# Patient Record
Sex: FEMALE | Race: BLACK OR AFRICAN AMERICAN | NOT HISPANIC OR LATINO | Employment: UNEMPLOYED | URBAN - METROPOLITAN AREA
[De-identification: names, ages, dates, MRNs, and addresses within clinical notes are randomized per-mention and may not be internally consistent; named-entity substitution may affect disease eponyms.]

---

## 2017-06-19 ENCOUNTER — OFFICE VISIT (OUTPATIENT)
Dept: OBSTETRICS AND GYNECOLOGY | Facility: CLINIC | Age: 37
End: 2017-06-19
Payer: COMMERCIAL

## 2017-06-19 VITALS
SYSTOLIC BLOOD PRESSURE: 130 MMHG | HEIGHT: 62 IN | DIASTOLIC BLOOD PRESSURE: 90 MMHG | WEIGHT: 191.81 LBS | BODY MASS INDEX: 35.3 KG/M2

## 2017-06-19 DIAGNOSIS — Z12.4 SCREENING FOR CERVICAL CANCER: ICD-10-CM

## 2017-06-19 DIAGNOSIS — N76.0 BACTERIAL VAGINOSIS: ICD-10-CM

## 2017-06-19 DIAGNOSIS — B96.89 BACTERIAL VAGINOSIS: ICD-10-CM

## 2017-06-19 DIAGNOSIS — Z01.419 ENCOUNTER FOR GYNECOLOGICAL EXAMINATION (GENERAL) (ROUTINE) WITHOUT ABNORMAL FINDINGS: Primary | ICD-10-CM

## 2017-06-19 PROCEDURE — 88175 CYTOPATH C/V AUTO FLUID REDO: CPT

## 2017-06-19 PROCEDURE — 99385 PREV VISIT NEW AGE 18-39: CPT | Mod: S$GLB,,, | Performed by: OBSTETRICS & GYNECOLOGY

## 2017-06-19 PROCEDURE — 99999 PR PBB SHADOW E&M-NEW PATIENT-LVL III: CPT | Mod: PBBFAC,,, | Performed by: OBSTETRICS & GYNECOLOGY

## 2017-06-19 PROCEDURE — 87480 CANDIDA DNA DIR PROBE: CPT

## 2017-06-19 RX ORDER — SITAGLIPTIN 50 MG/1
1 TABLET, FILM COATED ORAL DAILY
COMMUNITY
Start: 2017-04-29 | End: 2018-01-02 | Stop reason: DRUGHIGH

## 2017-06-19 RX ORDER — ERGOCALCIFEROL 1.25 MG/1
50000 CAPSULE ORAL
COMMUNITY
End: 2018-11-07

## 2017-06-19 NOTE — PROGRESS NOTES
Subjective:       Patient ID: Raulwabhishek Watts is a 36 y.o. female.    Chief Complaint:  Well Woman      History of Present Illness  HPI  Annual Exam-Premenopausal  Patient presents for annual exam. The patient has no complaints today. The patient is sexually active--denies pelvic pain or discomfort with intercourse. GYN screening history: last pap: approximate date cannot recall and was abnormal: --mayda secondary to hx of abnl pap and last mammogram: patient has never had a mammogram. The patient wears seatbelts: yes. The patient participates in regular exercise: yes.--but feels she is struggling with weight loss. Has the patient ever been transfused or tattooed?: yes. The patient reports that there is not domestic violence in her life.      Denies leak of urine  Denies hot flushes or night sweats    Regrets having had mayda--wants a son--(her stillbirth at 37 w was a son)          GYN & OB History  No LMP recorded. Patient has had a hysterectomy.   Date of Last Pap: No result found    OB History    Para Term  AB Living   7 6    6   SAB TAB Ectopic Multiple Live Births             # Outcome Date GA Lbr Girish/2nd Weight Sex Delivery Anes PTL Lv   7          FD   6 Para            5 Para            4 Para            3 Para            2 Para            1 Para                   Review of Systems  Review of Systems   Constitutional: Negative for activity change, appetite change, chills, diaphoresis, fatigue, fever and unexpected weight change.   HENT: Negative for mouth sores and tinnitus.    Eyes: Negative for discharge and visual disturbance.   Respiratory: Negative for cough, shortness of breath and wheezing.    Cardiovascular: Negative for chest pain, palpitations and leg swelling.   Gastrointestinal: Negative for abdominal pain, bloating, blood in stool, constipation, diarrhea, nausea and vomiting.   Endocrine: Negative for diabetes, hair loss, hot flashes, hyperthyroidism and hypothyroidism.    Genitourinary: Positive for vaginal discharge. Negative for decreased libido, dyspareunia, dysuria, flank pain, frequency, genital sores, hematuria, menorrhagia, menstrual problem, pelvic pain, urgency, vaginal bleeding, vaginal pain, dysmenorrhea, urinary incontinence, postcoital bleeding, postmenopausal bleeding and vaginal odor.   Musculoskeletal: Negative for back pain and myalgias.   Skin:  Negative for rash, no acne and hair changes.   Neurological: Negative for seizures, syncope, numbness and headaches.   Hematological: Negative for adenopathy. Does not bruise/bleed easily.   Psychiatric/Behavioral: Negative for depression and sleep disturbance. The patient is not nervous/anxious.    Breast: Negative for breast mass, breast pain, nipple discharge and skin changes          Objective:    Physical Exam:   Constitutional: She appears well-developed.     Eyes: Conjunctivae and EOM are normal. Pupils are equal, round, and reactive to light.    Neck: Normal range of motion. Neck supple.     Pulmonary/Chest: Effort normal. Right breast exhibits no mass, no nipple discharge, no skin change and no tenderness. Left breast exhibits no mass, no nipple discharge, no skin change and no tenderness. Breasts are symmetrical.        Abdominal: Soft.     Genitourinary: Rectum normal. Pelvic exam was performed with patient supine. Uterus is absent. Right adnexum displays no mass and no tenderness. Left adnexum displays no mass and no tenderness. No erythema, bleeding, rectocele, cystocele or unspecified prolapse of vaginal walls in the vagina. Vaginal discharge found. Labial bartholins normal.Cervix exhibits absence. Additional cervical findings: pap smear done          Musculoskeletal: Normal range of motion.       Neurological: She is alert.    Skin: Skin is warm.    Psychiatric: She has a normal mood and affect.          Assessment:     Encounter Diagnoses   Name Primary?    Encounter for gynecological examination (general)  (routine) without abnormal findings Yes    Screening for cervical cancer     Bacterial vaginosis                Plan:      Continue annual well woman exam.  Pap today due to hx of abnl pap  mammo due at age 41  Will get tsh,vit d labs for review--pt concerned that her thyroid meds were stopped and she is having more difficulty losing weight and feeling sluggish  Affirm today  Continue diet, exercise, weight loss

## 2017-06-20 LAB
CANDIDA RRNA VAG QL PROBE: NEGATIVE
G VAGINALIS RRNA GENITAL QL PROBE: NEGATIVE
T VAGINALIS RRNA GENITAL QL PROBE: NEGATIVE

## 2017-12-01 ENCOUNTER — LAB VISIT (OUTPATIENT)
Dept: LAB | Facility: HOSPITAL | Age: 37
End: 2017-12-01
Attending: INTERNAL MEDICINE
Payer: COMMERCIAL

## 2017-12-01 ENCOUNTER — OFFICE VISIT (OUTPATIENT)
Dept: INTERNAL MEDICINE | Facility: CLINIC | Age: 37
End: 2017-12-01
Payer: COMMERCIAL

## 2017-12-01 VITALS
BODY MASS INDEX: 33.6 KG/M2 | HEART RATE: 97 BPM | OXYGEN SATURATION: 96 % | HEIGHT: 62 IN | RESPIRATION RATE: 18 BRPM | SYSTOLIC BLOOD PRESSURE: 119 MMHG | TEMPERATURE: 98 F | DIASTOLIC BLOOD PRESSURE: 68 MMHG | WEIGHT: 182.56 LBS

## 2017-12-01 DIAGNOSIS — T14.8XXA MUSCLE STRAIN: Primary | ICD-10-CM

## 2017-12-01 DIAGNOSIS — R73.03 PREDIABETES: ICD-10-CM

## 2017-12-01 DIAGNOSIS — E66.09 CLASS 1 OBESITY DUE TO EXCESS CALORIES WITH SERIOUS COMORBIDITY IN ADULT, UNSPECIFIED BMI: ICD-10-CM

## 2017-12-01 DIAGNOSIS — E55.9 VITAMIN D DEFICIENCY: ICD-10-CM

## 2017-12-01 LAB
25(OH)D3+25(OH)D2 SERPL-MCNC: 47 NG/ML
ALBUMIN SERPL BCP-MCNC: 3.7 G/DL
ALP SERPL-CCNC: 70 U/L
ALT SERPL W/O P-5'-P-CCNC: 16 U/L
ANION GAP SERPL CALC-SCNC: 7 MMOL/L
AST SERPL-CCNC: 17 U/L
BASOPHILS # BLD AUTO: 0.04 K/UL
BASOPHILS NFR BLD: 0.8 %
BILIRUB SERPL-MCNC: 0.5 MG/DL
BUN SERPL-MCNC: 7 MG/DL
CALCIUM SERPL-MCNC: 9.8 MG/DL
CHLORIDE SERPL-SCNC: 106 MMOL/L
CHOLEST SERPL-MCNC: 163 MG/DL
CHOLEST/HDLC SERPL: 3.5 {RATIO}
CO2 SERPL-SCNC: 29 MMOL/L
CREAT SERPL-MCNC: 0.9 MG/DL
CREAT UR-MCNC: 79 MG/DL
DIFFERENTIAL METHOD: ABNORMAL
EOSINOPHIL # BLD AUTO: 0.2 K/UL
EOSINOPHIL NFR BLD: 3.4 %
ERYTHROCYTE [DISTWIDTH] IN BLOOD BY AUTOMATED COUNT: 13.2 %
EST. GFR  (AFRICAN AMERICAN): >60 ML/MIN/1.73 M^2
EST. GFR  (NON AFRICAN AMERICAN): >60 ML/MIN/1.73 M^2
ESTIMATED AVG GLUCOSE: 103 MG/DL
GLUCOSE SERPL-MCNC: 76 MG/DL
HBA1C MFR BLD HPLC: 5.2 %
HCT VFR BLD AUTO: 40.1 %
HDLC SERPL-MCNC: 46 MG/DL
HDLC SERPL: 28.2 %
HGB BLD-MCNC: 13.2 G/DL
IMM GRANULOCYTES # BLD AUTO: 0.03 K/UL
IMM GRANULOCYTES NFR BLD AUTO: 0.6 %
LDLC SERPL CALC-MCNC: 94.4 MG/DL
LYMPHOCYTES # BLD AUTO: 2.2 K/UL
LYMPHOCYTES NFR BLD: 41.4 %
MCH RBC QN AUTO: 29.6 PG
MCHC RBC AUTO-ENTMCNC: 32.9 G/DL
MCV RBC AUTO: 90 FL
MICROALBUMIN UR DL<=1MG/L-MCNC: <2.5 UG/ML
MICROALBUMIN/CREATININE RATIO: NORMAL UG/MG
MONOCYTES # BLD AUTO: 0.4 K/UL
MONOCYTES NFR BLD: 7 %
NEUTROPHILS # BLD AUTO: 2.5 K/UL
NEUTROPHILS NFR BLD: 46.8 %
NONHDLC SERPL-MCNC: 117 MG/DL
NRBC BLD-RTO: 0 /100 WBC
PLATELET # BLD AUTO: 245 K/UL
PMV BLD AUTO: 11.1 FL
POTASSIUM SERPL-SCNC: 3.9 MMOL/L
PROT SERPL-MCNC: 7.1 G/DL
RBC # BLD AUTO: 4.46 M/UL
SODIUM SERPL-SCNC: 142 MMOL/L
T4 FREE SERPL-MCNC: 1.03 NG/DL
TRIGL SERPL-MCNC: 113 MG/DL
TSH SERPL DL<=0.005 MIU/L-ACNC: 1.02 UIU/ML
WBC # BLD AUTO: 5.32 K/UL

## 2017-12-01 PROCEDURE — 80061 LIPID PANEL: CPT

## 2017-12-01 PROCEDURE — 83036 HEMOGLOBIN GLYCOSYLATED A1C: CPT

## 2017-12-01 PROCEDURE — 99999 PR PBB SHADOW E&M-EST. PATIENT-LVL III: CPT | Mod: PBBFAC,,, | Performed by: INTERNAL MEDICINE

## 2017-12-01 PROCEDURE — 99204 OFFICE O/P NEW MOD 45 MIN: CPT | Mod: S$GLB,,, | Performed by: INTERNAL MEDICINE

## 2017-12-01 PROCEDURE — 80053 COMPREHEN METABOLIC PANEL: CPT

## 2017-12-01 PROCEDURE — 36415 COLL VENOUS BLD VENIPUNCTURE: CPT | Mod: PO

## 2017-12-01 PROCEDURE — 82570 ASSAY OF URINE CREATININE: CPT

## 2017-12-01 PROCEDURE — 85025 COMPLETE CBC W/AUTO DIFF WBC: CPT

## 2017-12-01 PROCEDURE — 82306 VITAMIN D 25 HYDROXY: CPT

## 2017-12-01 PROCEDURE — 84443 ASSAY THYROID STIM HORMONE: CPT

## 2017-12-01 PROCEDURE — 84439 ASSAY OF FREE THYROXINE: CPT

## 2017-12-01 RX ORDER — NAPROXEN 500 MG/1
500 TABLET ORAL DAILY PRN
Start: 2017-12-01 | End: 2018-11-07

## 2017-12-01 RX ORDER — CYCLOBENZAPRINE HCL 5 MG
5 TABLET ORAL 3 TIMES DAILY PRN
Start: 2017-12-01 | End: 2017-12-11

## 2017-12-01 RX ORDER — SPIRONOLACTONE 25 MG/1
25 TABLET ORAL 2 TIMES DAILY
Qty: 60 TABLET | Refills: 11
Start: 2017-12-01 | End: 2018-02-05 | Stop reason: SDUPTHER

## 2017-12-01 NOTE — PROGRESS NOTES
Subjective:      Patient ID: Shawndrosalva Watts is a 37 y.o. female.    Chief Complaint: No chief complaint on file.      HPI  Ms. Watts presents to establish primary care.    She reports feeling phillips, fatigued and aggravation and sadness over the past 1 year, but more intense over the past 3 months and even more over the past 3-4 weeks. She reports crying frequently. No recent deaths in family. +work & family related stress, which she attributes to her mood.    Past Medical History:   Diagnosis Date    Abnormal Pap smear of cervix     Thyroid disease     h/o hypothyroidism     Past Surgical History:   Procedure Laterality Date     SECTION      DILATION AND CURETTAGE OF UTERUS      HYSTERECTOMY      Still has ovaries     Social History     Social History    Marital status: Single     Spouse name: N/A    Number of children: N/A    Years of education: N/A     Occupational History    Not on file.     Social History Main Topics    Smoking status: Never Smoker    Smokeless tobacco: Never Used    Alcohol use 0.0 - 0.6 oz/week      Comment: 1 glass of wine every 4-5 mo    Drug use: No    Sexual activity: Yes     Partners: Male      Comment:      Other Topics Concern    Not on file     Social History Narrative    Breakfast: 25%: Nutra-Mint Shake (meal replacement)    Lunch: 75% Grilled chicken or salad; 25% burger & chip; water    Dinner: 1 slice pizza; water    Snacks: Star crunch pie or candy    Eats out: 3x/mo    Water: 48-64    PA: 3x/wk; each 1 hour    Goal weight is 150 lbs         Family History   Problem Relation Age of Onset    Colon cancer Father     Cancer Father     Hypertension Mother     Breast cancer Neg Hx     Ovarian cancer Neg Hx        Current Outpatient Prescriptions:     ergocalciferol (VITAMIN D2) 50,000 unit Cap, Take 50,000 Units by mouth every 7 days., Disp: , Rfl:     JANUVIA 50 mg Tab, Take 1 tablet by mouth once daily at 6am., Disp: , Rfl:      "cyclobenzaprine (FLEXERIL) 5 MG tablet, Take 1 tablet (5 mg total) by mouth 3 (three) times daily as needed for Muscle spasms., Disp: , Rfl:     naproxen (NAPROSYN) 500 MG tablet, Take 1 tablet (500 mg total) by mouth daily as needed (right neck pain)., Disp: , Rfl:     spironolactone (ALDACTONE) 25 MG tablet, Take 1 tablet (25 mg total) by mouth 2 (two) times daily., Disp: 60 tablet, Rfl: 11    Review of patient's allergies indicates:  No Known Allergies    Review of Systems   Negative.     Objective:     /68 (BP Location: Left arm, Patient Position: Sitting, BP Method: Large (Automatic))   Pulse 97   Temp 98 °F (36.7 °C) (Tympanic)   Resp 18   Ht 5' 2" (1.575 m)   Wt 82.8 kg (182 lb 8.7 oz)   SpO2 96%   BMI 33.39 kg/m²     Physical Exam  GEN: A&O fully, NAD  PSYC: Normal affect      Assessment:      1. Muscle strain: I recommend physical therapy, tylenol 650 mg by mouth three times daily and ibuprofen 400 mg 1-2 tabs daily as needed for breakthrough pain. Of note, ibuprofen and other NSAIDS (nonsteroidal inflammatory drugs; i.e. Ibuprofen, Motrin, Advil, Aleve, Naprosyn, Aspirin, Goodies, Stanback, etc.) should be taken with food (to protect your stomach from bleeding) and should not be taken regularly beyond 1-2 weeks (to protect your kidneys).   2. Class 1 obesity due to excess calories with serious comorbidity in adult, unspecified BMI    3. Prediabetes: Will check A1c.    4. Vitamin D deficiency      Plan:   Muscle strain  -     cyclobenzaprine (FLEXERIL) 5 MG tablet; Take 1 tablet (5 mg total) by mouth 3 (three) times daily as needed for Muscle spasms.  -     Ambulatory consult to Physical Therapy    Class 1 obesity due to excess calories with serious comorbidity in adult, unspecified BMI    Prediabetes  -     cyclobenzaprine (FLEXERIL) 5 MG tablet; Take 1 tablet (5 mg total) by mouth 3 (three) times daily as needed for Muscle spasms.  -     CBC auto differential; Future; Expected date: " 12/01/2017  -     Comprehensive metabolic panel; Future; Expected date: 12/01/2017  -     Lipid panel; Future; Expected date: 12/01/2017  -     T4, free; Future; Expected date: 12/01/2017  -     TSH; Future; Expected date: 12/01/2017  -     Hemoglobin A1c; Future; Expected date: 12/01/2017  -     Microalbumin/creatinine urine ratio    Vitamin D deficiency  -     Vitamin D; Future; Expected date: 12/01/2017    Other orders  -     spironolactone (ALDACTONE) 25 MG tablet; Take 1 tablet (25 mg total) by mouth 2 (two) times daily.  Dispense: 60 tablet; Refill: 11  -     naproxen (NAPROSYN) 500 MG tablet; Take 1 tablet (500 mg total) by mouth daily as needed (right neck pain).        RTC in 4 weeks RE depression and muscle strain or sooner as needed

## 2017-12-11 ENCOUNTER — TELEPHONE (OUTPATIENT)
Dept: INTERNAL MEDICINE | Facility: CLINIC | Age: 37
End: 2017-12-11

## 2017-12-18 ENCOUNTER — TELEPHONE (OUTPATIENT)
Dept: INTERNAL MEDICINE | Facility: CLINIC | Age: 37
End: 2017-12-18

## 2017-12-18 NOTE — TELEPHONE ENCOUNTER
----- Message from Yolanda Mackenzie sent at 12/18/2017  4:07 PM CST -----  Pt at [phone]//states is calling regarding a script being sent in//[facility]??????//or does she need to come in// uses[facility] [place], [place]//please call to discussl//thanks/ricky

## 2017-12-26 ENCOUNTER — TELEPHONE (OUTPATIENT)
Dept: OBSTETRICS AND GYNECOLOGY | Facility: CLINIC | Age: 37
End: 2017-12-26

## 2017-12-26 NOTE — TELEPHONE ENCOUNTER
I spoke with pt she's complaining of a vaginal discharge with a fishy odor.  Appointment scheduled on Friday 12/29/17 at 2:30p with Laura Penn at the [place] location.  She verbalized understanding

## 2017-12-26 NOTE — TELEPHONE ENCOUNTER
----- Message from Karyn Cooper sent at 12/26/2017 11:51 AM CST -----  Patient states that she has a discharged and would like [facility] to [phone].  Call her at [phone].                                               prince

## 2018-01-02 ENCOUNTER — OFFICE VISIT (OUTPATIENT)
Dept: INTERNAL MEDICINE | Facility: CLINIC | Age: 38
End: 2018-01-02
Payer: COMMERCIAL

## 2018-01-02 VITALS
TEMPERATURE: 98 F | HEART RATE: 103 BPM | OXYGEN SATURATION: 97 % | SYSTOLIC BLOOD PRESSURE: 106 MMHG | BODY MASS INDEX: 33.34 KG/M2 | HEIGHT: 62 IN | DIASTOLIC BLOOD PRESSURE: 66 MMHG | WEIGHT: 181.19 LBS

## 2018-01-02 DIAGNOSIS — Z79.4 DIABETES MELLITUS DUE TO UNDERLYING CONDITION WITH HYPEROSMOLARITY WITHOUT COMA, WITH LONG-TERM CURRENT USE OF INSULIN: ICD-10-CM

## 2018-01-02 DIAGNOSIS — R09.81 NASAL CONGESTION: Primary | ICD-10-CM

## 2018-01-02 DIAGNOSIS — E08.00 DIABETES MELLITUS DUE TO UNDERLYING CONDITION WITH HYPEROSMOLARITY WITHOUT COMA, WITH LONG-TERM CURRENT USE OF INSULIN: ICD-10-CM

## 2018-01-02 PROCEDURE — 99999 PR PBB SHADOW E&M-EST. PATIENT-LVL III: CPT | Mod: PBBFAC,,, | Performed by: INTERNAL MEDICINE

## 2018-01-02 PROCEDURE — 99213 OFFICE O/P EST LOW 20 MIN: CPT | Mod: PBBFAC,PN | Performed by: INTERNAL MEDICINE

## 2018-01-02 PROCEDURE — 99214 OFFICE O/P EST MOD 30 MIN: CPT | Mod: S$PBB,,, | Performed by: INTERNAL MEDICINE

## 2018-01-02 RX ORDER — FLUTICASONE PROPIONATE 50 MCG
1 SPRAY, SUSPENSION (ML) NASAL DAILY
Qty: 1 BOTTLE | Refills: 0 | Status: SHIPPED | OUTPATIENT
Start: 2018-01-02 | End: 2018-02-05

## 2018-01-02 RX ORDER — ALOGLIPTIN 25 MG/1
25 TABLET, FILM COATED ORAL DAILY
Qty: 90 TABLET | Refills: 3 | Status: SHIPPED | OUTPATIENT
Start: 2018-01-02 | End: 2018-02-05

## 2018-01-02 RX ORDER — GUAIFENESIN 600 MG/1
600 TABLET, EXTENDED RELEASE ORAL 2 TIMES DAILY
Qty: 60 TABLET | Refills: 2 | Status: SHIPPED | OUTPATIENT
Start: 2018-01-02 | End: 2018-02-05

## 2018-01-02 RX ORDER — CETIRIZINE HYDROCHLORIDE 10 MG/1
10 TABLET ORAL DAILY
Refills: 0 | COMMUNITY
Start: 2018-01-02 | End: 2018-02-05

## 2018-01-02 NOTE — PROGRESS NOTES
Subjective:      Patient ID: Fco Watts is a 37 y.o. female.    Chief Complaint: Follow-up and Nasal Congestion      HPI     Ms. Fco Watts is a patient of Rajeev Rodriguez MD, who presents for 2 day h/o nasal congestion and HA. Of note, she was exposed to 2 patients at work who were dx'd with influenza. She had her flu shot this year. No muscle aches. No f/c. No diaphoresis. +sneezing. No sore throat. No tooth or facial pains. +intermittent bilateral ear aches. No cough.     Past Medical History:   Diagnosis Date    Abnormal Pap smear of cervix     Thyroid disease     h/o hypothyroidism     Past Surgical History:   Procedure Laterality Date     SECTION      DILATION AND CURETTAGE OF UTERUS      HYSTERECTOMY      Still has ovaries     Social History     Social History    Marital status: Single     Spouse name: N/A    Number of children: N/A    Years of education: N/A     Occupational History    Not on file.     Social History Main Topics    Smoking status: Never Smoker    Smokeless tobacco: Never Used    Alcohol use 0.0 - 0.6 oz/week      Comment: 1 glass of wine every 4-5 mo    Drug use: No    Sexual activity: Yes     Partners: Male      Comment:      Other Topics Concern    Not on file     Social History Narrative    Breakfast: 25%: Nutra-Mint Shake (meal replacement)    Lunch: 75% Grilled chicken or salad; 25% burger & chip; water    Dinner: 1 slice pizza; water    Snacks: Star crunch pie or candy    Eats out: 3x/mo    Water: 48-64    PA: 3x/wk; each 1 hour    Goal weight is 150 lbs         Family History   Problem Relation Age of Onset    Colon cancer Father     Cancer Father     Hypertension Mother     Breast cancer Neg Hx     Ovarian cancer Neg Hx        Current Outpatient Prescriptions:     ergocalciferol (VITAMIN D2) 50,000 unit Cap, Take 50,000 Units by mouth every 7 days., Disp: , Rfl:     naproxen (NAPROSYN) 500 MG tablet, Take 1 tablet (500 mg total)  "by mouth daily as needed (right neck pain)., Disp: , Rfl:     spironolactone (ALDACTONE) 25 MG tablet, Take 1 tablet (25 mg total) by mouth 2 (two) times daily., Disp: 60 tablet, Rfl: 11    alogliptin 25 mg Tab, Take 25 mg by mouth once daily., Disp: 90 tablet, Rfl: 3    cetirizine (ZYRTEC) 10 MG tablet, Take 1 tablet (10 mg total) by mouth once daily., Disp: , Rfl: 0    fluticasone (FLONASE) 50 mcg/actuation nasal spray, 1 spray by Each Nare route once daily., Disp: 1 Bottle, Rfl: 0    guaiFENesin (MUCINEX) 600 mg 12 hr tablet, Take 1 tablet (600 mg total) by mouth 2 (two) times daily., Disp: 60 tablet, Rfl: 2    Review of patient's allergies indicates:  No Known Allergies     Review of Systems   Negative    Objective:     /66 (BP Location: Left arm, Patient Position: Sitting, BP Method: Large (Automatic))   Pulse 103   Temp 97.6 °F (36.4 °C) (Tympanic)   Ht 5' 2" (1.575 m)   Wt 82.2 kg (181 lb 3.5 oz)   SpO2 97%   BMI 33.15 kg/m²     Physical Exam  GEN: A&O fully, NAD  PSYC: Normal affect  HEENT: OP: Clear, shotty LAD, no thyroid masses, unable to clearly visualize TMs 2/2 partial cerumen impaction  CV: RRR, no M/G/R  PULM: CTA bilaterally, no wheezes, rales    Lab Results   Component Value Date    WBC 5.32 12/01/2017    HGB 13.2 12/01/2017    HCT 40.1 12/01/2017     12/01/2017    CHOL 163 12/01/2017    TRIG 113 12/01/2017    HDL 46 12/01/2017    LDLCALC 94.4 12/01/2017    ALT 16 12/01/2017    AST 17 12/01/2017     12/01/2017    K 3.9 12/01/2017     12/01/2017    CREATININE 0.9 12/01/2017    BUN 7 12/01/2017    CO2 29 12/01/2017    TSH 1.017 12/01/2017    HGBA1C 5.2 12/01/2017       Assessment:      1. Nasal congestion: Likely viral etiology. Risks and benefits discussed and patient chose to move forward with cetirizine, fluticasone and gaifenesin prn.   2.      DM2: A1c well controlled. Risks and benefits discussed and patient chose to move forward switching           from " Januvia 50 to 25 mg qd.     Plan:   Nasal congestion  -     cetirizine (ZYRTEC) 10 MG tablet; Take 1 tablet (10 mg total) by mouth once daily.; Refill: 0  -     POCT INFLUENZA A/B  -     fluticasone (FLONASE) 50 mcg/actuation nasal spray; 1 spray by Each Nare route once daily.  Dispense: 1 Bottle; Refill: 0  -     guaiFENesin (MUCINEX) 600 mg 12 hr tablet; Take 1 tablet (600 mg total) by mouth 2 (two) times daily.  Dispense: 60 tablet; Refill: 2    Diabetes mellitus due to underlying condition with hyperosmolarity without coma, with long-term current use of insulin  -     alogliptin 25 mg Tab; Take 25 mg by mouth once daily.  Dispense: 90 tablet; Refill: 3    Other orders  -     Discontinue: SITagliptin (JANUVIA) 25 MG Tab; Take 1 tablet (25 mg total) by mouth once daily.  Dispense: 90 tablet; Refill: 3        Return in about 3 months (around 4/2/2018), or if symptoms worsen or fail to improve, for FU on obesity, DM2.       ADDENDUM:  Received fax re: pt's ins rejecting Januvia, but will cover alogliptin, which we will prescribe at same 25 mg dose QD instead.

## 2018-01-02 NOTE — LETTER
January 2, 2018                 [facility] Internal Medicine  Internal Medicine  [address] [place] [address]  [place] [address]  Phone: [phone]   January 2, 2018     Patient: Fco Watts   YOB: 1980   Date of Visit: 1/2/2018       To Whom it May Concern:    Fco Watts was seen in my clinic on 1/2/2018. She may return to work on 01/02/2018..    If you have any questions or concerns, please don't hesitate to call.    Sincerely,       MD Kathleen Garcia LPN

## 2018-01-16 ENCOUNTER — TELEPHONE (OUTPATIENT)
Dept: INTERNAL MEDICINE | Facility: CLINIC | Age: 38
End: 2018-01-16

## 2018-01-16 DIAGNOSIS — K59.09 OTHER CONSTIPATION: Primary | ICD-10-CM

## 2018-01-16 NOTE — TELEPHONE ENCOUNTER
"Spoke with pt, she stated she has not had a BM in a "few days." Pt is unsure exactly when her last BM was but she is having abdominal cramping with the constipation. Pt stated she has not had any new changes in diet. Pt stated her last BM was normal from what she can remember. Pt asking for a something to be called in. Please review and advise. Let pt know we  would call back with information. Pt verbalized understanding.   "

## 2018-01-16 NOTE — TELEPHONE ENCOUNTER
----- Message from Josselin Quintero sent at 1/16/2018  7:51 AM CST -----  Contact: [phone]   Pt would like something called in for stomach cramps and bowel movement.  Please call back at [phone]. [facility]           Pt Uses:  .  [facility]/pharmacy #[id number] - [place], [place]  [address]   [address] [facility] [address]  Phone: [phone] Fax: [phone]  .

## 2018-02-05 ENCOUNTER — OFFICE VISIT (OUTPATIENT)
Dept: OBSTETRICS AND GYNECOLOGY | Facility: CLINIC | Age: 38
End: 2018-02-05
Payer: MEDICAID

## 2018-02-05 VITALS
SYSTOLIC BLOOD PRESSURE: 122 MMHG | WEIGHT: 178.44 LBS | BODY MASS INDEX: 32.84 KG/M2 | HEIGHT: 62 IN | DIASTOLIC BLOOD PRESSURE: 80 MMHG

## 2018-02-05 DIAGNOSIS — N76.0 BACTERIAL VAGINOSIS: Primary | ICD-10-CM

## 2018-02-05 DIAGNOSIS — B96.89 BACTERIAL VAGINOSIS: Primary | ICD-10-CM

## 2018-02-05 DIAGNOSIS — L68.0 FEMALE HIRSUTISM: ICD-10-CM

## 2018-02-05 PROCEDURE — 99999 PR PBB SHADOW E&M-EST. PATIENT-LVL II: CPT | Mod: PBBFAC,,, | Performed by: OBSTETRICS & GYNECOLOGY

## 2018-02-05 PROCEDURE — 99214 OFFICE O/P EST MOD 30 MIN: CPT | Mod: S$PBB,,, | Performed by: OBSTETRICS & GYNECOLOGY

## 2018-02-05 PROCEDURE — 99212 OFFICE O/P EST SF 10 MIN: CPT | Mod: PBBFAC,PN | Performed by: OBSTETRICS & GYNECOLOGY

## 2018-02-05 PROCEDURE — 87480 CANDIDA DNA DIR PROBE: CPT

## 2018-02-05 PROCEDURE — 3008F BODY MASS INDEX DOCD: CPT | Mod: ,,, | Performed by: OBSTETRICS & GYNECOLOGY

## 2018-02-05 RX ORDER — SPIRONOLACTONE 25 MG/1
50 TABLET ORAL DAILY
Qty: 30 TABLET | Refills: 11
Start: 2018-02-05 | End: 2018-02-06 | Stop reason: SDUPTHER

## 2018-02-05 RX ORDER — METRONIDAZOLE 500 MG/1
500 TABLET ORAL EVERY 12 HOURS
Qty: 14 TABLET | Refills: 0 | Status: SHIPPED | OUTPATIENT
Start: 2018-02-05 | End: 2018-02-12

## 2018-02-05 NOTE — PROGRESS NOTES
Subjective:       Patient ID: Fco Watts is a 37 y.o. female.    Chief Complaint:  Vaginitis (d/c, odor)      History of Present Illness  HPI  Here for problem  C/o increased vaginal discharge with odor  Also c/o increased facial hair--feels increased facial hair got worse after hysterectomy  Denies hot flushes    GYN & OB History  No LMP recorded. Patient has had a hysterectomy.   Date of Last Pap: 2017    OB History    Para Term  AB Living   7 6       6   SAB TAB Ectopic Multiple Live Births           6      # Outcome Date GA Lbr Girish/2nd Weight Sex Delivery Anes PTL Lv   7          FD   6 Para      CS-LTranv   ANTONIO   5 Para      Vag-Spont   ANTONIO   4 Para      Vag-Spont   ANTONIO   3 Para      Vag-Spont   ANTONIO   2 Para      Vag-Spont   ANTONIO   1 Para      Vag-Spont   ANTONIO          Review of Systems  Review of Systems   Constitutional: Negative for activity change, appetite change, chills, diaphoresis, fatigue, fever and unexpected weight change.   HENT: Negative for mouth sores and tinnitus.    Eyes: Negative for discharge and visual disturbance.   Respiratory: Negative for cough, shortness of breath and wheezing.    Cardiovascular: Negative for chest pain, palpitations and leg swelling.   Gastrointestinal: Negative for abdominal pain, bloating, blood in stool, constipation, diarrhea, nausea and vomiting.   Endocrine: Negative for diabetes, hair loss, hot flashes, hyperthyroidism and hypothyroidism.   Genitourinary: Positive for vaginal discharge and vaginal odor. Negative for decreased libido, dyspareunia, dysuria, flank pain, frequency, genital sores, hematuria, menorrhagia, menstrual problem, pelvic pain, urgency, vaginal bleeding, vaginal pain, dysmenorrhea, urinary incontinence, postcoital bleeding and postmenopausal bleeding.   Musculoskeletal: Negative for back pain and myalgias.   Skin:  Negative for rash, no acne and hair changes.   Neurological: Negative for seizures, syncope,  numbness and headaches.   Hematological: Negative for adenopathy. Does not bruise/bleed easily.   Psychiatric/Behavioral: Negative for depression and sleep disturbance. The patient is not nervous/anxious.    Breast: Negative for breast mass, breast pain, nipple discharge and skin changes          Objective:    Physical Exam:   Constitutional: She appears well-developed.     Eyes: Conjunctivae and EOM are normal. Pupils are equal, round, and reactive to light.    Neck: Normal range of motion. Neck supple.     Pulmonary/Chest: Effort normal. Right breast exhibits no mass, no nipple discharge, no skin change, no tenderness and presence. Left breast exhibits no mass, no nipple discharge, no skin change, no tenderness and presence. Breasts are symmetrical.        Abdominal: Soft.     Genitourinary: Uterus normal. Pelvic exam was performed with patient supine. Vaginal discharge found.           Musculoskeletal: Normal range of motion.       Neurological: She is alert.    Skin: Skin is warm.    Psychiatric: She has a normal mood and affect.          Assessment:        Encounter Diagnoses   Name Primary?    Bacterial vaginosis Yes    Female hirsutism         Plan:      Suspect bv, rx sent for flagyl  Agrees to increase spironolactone, pt also advised to get electrolysis

## 2018-02-06 ENCOUNTER — TELEPHONE (OUTPATIENT)
Dept: OBSTETRICS AND GYNECOLOGY | Facility: CLINIC | Age: 38
End: 2018-02-06

## 2018-02-06 DIAGNOSIS — L68.0 FEMALE HIRSUTISM: Primary | ICD-10-CM

## 2018-02-06 RX ORDER — SPIRONOLACTONE 25 MG/1
50 TABLET ORAL DAILY
Qty: 30 TABLET | Refills: 11
Start: 2018-02-06 | End: 2018-04-05 | Stop reason: SDUPTHER

## 2018-02-07 LAB
CANDIDA RRNA VAG QL PROBE: NEGATIVE
G VAGINALIS RRNA GENITAL QL PROBE: POSITIVE
T VAGINALIS RRNA GENITAL QL PROBE: NEGATIVE

## 2018-02-08 ENCOUNTER — TELEPHONE (OUTPATIENT)
Dept: OBSTETRICS AND GYNECOLOGY | Facility: CLINIC | Age: 38
End: 2018-02-08

## 2018-02-08 NOTE — TELEPHONE ENCOUNTER
Pt. prefers pills. patrick Sanchez    Spoke to the pt. And informed her that her vaginal culture is positive for bacterial vaginosis.  This is overgrowth of your normal bacteria.  Pt. Acknowledged understanding. patrick Sanchez

## 2018-02-08 NOTE — TELEPHONE ENCOUNTER
----- Message from Karyn Cooper sent at 2/8/2018 12:54 PM CST -----  Patient states that she was returning your call.   Call her at [phone].    prince

## 2018-04-05 DIAGNOSIS — L68.0 FEMALE HIRSUTISM: ICD-10-CM

## 2018-04-05 RX ORDER — SPIRONOLACTONE 25 MG/1
50 TABLET ORAL DAILY
Qty: 30 TABLET | Refills: 11
Start: 2018-04-05 | End: 2018-11-07

## 2018-06-03 ENCOUNTER — OFFICE VISIT (OUTPATIENT)
Dept: URGENT CARE | Facility: CLINIC | Age: 38
End: 2018-06-03
Payer: MEDICAID

## 2018-06-03 VITALS
TEMPERATURE: 99 F | RESPIRATION RATE: 18 BRPM | OXYGEN SATURATION: 98 % | HEIGHT: 62 IN | BODY MASS INDEX: 33.6 KG/M2 | DIASTOLIC BLOOD PRESSURE: 70 MMHG | WEIGHT: 182.56 LBS | SYSTOLIC BLOOD PRESSURE: 120 MMHG | HEART RATE: 94 BPM

## 2018-06-03 DIAGNOSIS — R05.9 COUGH: ICD-10-CM

## 2018-06-03 DIAGNOSIS — J18.0 BRONCHIAL PNEUMONIA: Primary | ICD-10-CM

## 2018-06-03 PROCEDURE — 99214 OFFICE O/P EST MOD 30 MIN: CPT | Mod: S$PBB,,, | Performed by: NURSE PRACTITIONER

## 2018-06-03 PROCEDURE — 99214 OFFICE O/P EST MOD 30 MIN: CPT | Mod: PBBFAC,PN | Performed by: NURSE PRACTITIONER

## 2018-06-03 PROCEDURE — 99999 PR PBB SHADOW E&M-EST. PATIENT-LVL IV: CPT | Mod: PBBFAC,,, | Performed by: NURSE PRACTITIONER

## 2018-06-03 RX ORDER — AZITHROMYCIN 500 MG/1
500 TABLET, FILM COATED ORAL DAILY
Qty: 5 TABLET | Refills: 0 | Status: SHIPPED | OUTPATIENT
Start: 2018-06-03 | End: 2018-06-08

## 2018-06-03 RX ORDER — PREDNISONE 20 MG/1
20 TABLET ORAL DAILY
Qty: 4 TABLET | Refills: 0 | Status: SHIPPED | OUTPATIENT
Start: 2018-06-03 | End: 2018-06-07

## 2018-06-03 RX ORDER — ALBUTEROL SULFATE 90 UG/1
2 AEROSOL, METERED RESPIRATORY (INHALATION) EVERY 6 HOURS PRN
Qty: 18 G | Refills: 0 | Status: SHIPPED | OUTPATIENT
Start: 2018-06-03 | End: 2018-10-31 | Stop reason: ALTCHOICE

## 2018-06-03 RX ORDER — BROMPHENIRAMINE MALEATE, PSEUDOEPHEDRINE HYDROCHLORIDE, AND DEXTROMETHORPHAN HYDROBROMIDE 2; 30; 10 MG/5ML; MG/5ML; MG/5ML
10 SYRUP ORAL
Qty: 220 ML | Refills: 0 | Status: SHIPPED | OUTPATIENT
Start: 2018-06-03 | End: 2018-10-31 | Stop reason: ALTCHOICE

## 2018-06-03 NOTE — PATIENT INSTRUCTIONS
Pneumonia (Adult)  Pneumonia is an infection deep within the lungs. It is in the small air sacs (alveoli). Pneumonia may be caused by a virus or bacteria. Pneumonia caused by bacteria is usually treated with an antibiotic. Severe cases may need to be treated in the hospital. Milder cases can be treated at home. Symptoms usually start to get better during the first 2 days of treatment.    Home care  Follow these guidelines when caring for yourself at home:  · Rest at home for the first 2 to 3 days, or until you feel stronger. Dont let yourself get overly tired when you go back to your activities.  · Stay away from cigarette smoke - yours or other peoples.  · You may use acetaminophen or ibuprofen to control fever or pain, unless another medicine was prescribed. If you have chronic liver or kidney disease, talk with your healthcare provider before using these medicines. Also talk with your provider if youve had a stomach ulcer or gastrointestinal bleeding. Dont give aspirin to anyone younger than 18 years of age who is ill with a fever. It may cause severe liver damage.  · Your appetite may be poor, so a light diet is fine.  · Drink 6 to 8 glasses of fluids every day to make sure you are getting enough fluids. Beverages can include water, sport drinks, sodas without caffeine, juices, tea, or soup. Fluids will help loosen secretions in the lung. This will make it easier for you to cough up the phlegm (sputum). If you also have heart or kidney disease, check with your healthcare provider before you drink extra fluids.  · Take antibiotic medicine prescribed until it is all gone, even if you are feeling better after a few days.  Follow-up care  Follow up with your healthcare provider in the next 2 to 3 days, or as advised. This is to be sure the medicine is helping you get better.  If you are 65 or older, you should get a pneumococcal vaccine and a yearly flu (influenza) shot. You should also get these vaccines if  you have chronic lung disease like asthma, emphysema, or COPD. Recently, a second type of pneumonia vaccine has become available for everyone over 65 years old. This is in addition to the previous vaccine. Ask your provider about this.  When to seek medical advice  Call your healthcare provider right away if any of these occur:  · You dont get better within the first 48 hours of treatment  · Shortness of breath gets worse  · Rapid breathing (more than 25 breaths per minute)  · Coughing up blood  · Chest pain gets worse with breathing  · Fever of 100.4°F (38°C) or higher that doesnt get better with fever medicine  · Weakness, dizziness, or fainting that gets worse  · Thirst or dry mouth that gets worse  · Sinus pain, headache, or a stiff neck  · Chest pain not caused by coughing  Date Last Reviewed: 1/1/2017  © 7972-7025 [employer], [employer]. [address] [place] [address]. All rights reserved. This information is not intended as a substitute for professional medical care. Always follow your healthcare professional's instructions.

## 2018-06-12 NOTE — TELEPHONE ENCOUNTER
Called # listed, Unable to leave message- busy tone.    [facility] (Dr. Mansfield) & Hospitalist (Dr. Sky)

## 2018-06-13 NOTE — PROGRESS NOTES
Subjective:       Patient ID: Shawabhishek Watts is a 37 y.o. female.    Chief Complaint: Cough    37 year old female presents to Urgent Care with reports of cough, fever with sputum production that has been present for about 2 weeks.       Cough   This is a new problem. The problem has been gradually worsening. The cough is productive of sputum. Associated symptoms include a fever and wheezing. Pertinent negatives include no chest pain, chills, ear pain, rash, sore throat or shortness of breath. Nothing aggravates the symptoms. She has tried nothing for the symptoms. The treatment provided no relief. Her past medical history is significant for pneumonia. There is no history of environmental allergies.     Review of Systems   Constitutional: Positive for fever. Negative for appetite change and chills.   HENT: Negative for ear pain, sinus pressure, sore throat and trouble swallowing.    Eyes: Negative for visual disturbance.   Respiratory: Positive for cough and wheezing. Negative for shortness of breath.    Cardiovascular: Negative for chest pain.   Gastrointestinal: Negative for abdominal pain, diarrhea, nausea and vomiting.   Endocrine: Negative for cold intolerance, polyphagia and polyuria.   Genitourinary: Negative for decreased urine volume and dysuria.   Musculoskeletal: Negative for back pain.   Skin: Negative for rash.   Allergic/Immunologic: Negative for environmental allergies and food allergies.   Neurological: Negative for dizziness, tremors, weakness and numbness.   Hematological: Does not bruise/bleed easily.   Psychiatric/Behavioral: Negative for confusion and hallucinations. The patient is not nervous/anxious and is not hyperactive.    All other systems reviewed and are negative.      Objective:     Physical Exam   Constitutional: She is oriented to person, place, and time. She appears well-developed and well-nourished.   HENT:   Head: Normocephalic and atraumatic.   Right Ear: External ear  normal.   Left Ear: External ear normal.   Nose: Nose normal.   Mouth/Throat: Oropharynx is clear and moist.   Eyes: Conjunctivae and EOM are normal. Pupils are equal, round, and reactive to light.   Neck: Normal range of motion. Neck supple.   Cardiovascular: Normal rate, regular rhythm, normal heart sounds and intact distal pulses.    No murmur heard.  Pulmonary/Chest: Effort normal. She has decreased breath sounds in the right upper field and the right lower field. She has no wheezes.   Abdominal: Soft. Bowel sounds are normal. There is no tenderness.   Musculoskeletal: Normal range of motion.   Neurological: She is alert and oriented to person, place, and time. She has normal reflexes.   Skin: Skin is warm and dry. No rash noted.   Psychiatric: She has a normal mood and affect. Her behavior is normal. Judgment and thought content normal.   Nursing note and vitals reviewed.  Instructed patient to follow prescribed treatment plan as directed and recommended follow up with PCP within 1 week or sooner if needed. Report to ER if symptoms worsen or no improvement    Assessment:     1. Bronchial pneumonia    2. Cough      Plan:   Fco was seen today for cough.    Diagnoses and all orders for this visit:    Bronchial pneumonia    Cough    Other orders  -     azithromycin (ZITHROMAX) 500 MG tablet; Take 1 tablet (500 mg total) by mouth once daily.  -     predniSONE (DELTASONE) 20 MG tablet; Take 1 tablet (20 mg total) by mouth once daily.  -     brompheniramine-pseudoeph-DM (BROMFED DM) 2-30-10 mg/5 mL Syrp; Take 10 mLs by mouth every 6 to 8 hours as needed.  -     albuterol 90 mcg/actuation inhaler; Inhale 2 puffs into the lungs every 6 (six) hours as needed for Wheezing. Rescue

## 2018-10-09 ENCOUNTER — TELEPHONE (OUTPATIENT)
Dept: INTERNAL MEDICINE | Facility: CLINIC | Age: 38
End: 2018-10-09

## 2018-10-09 NOTE — TELEPHONE ENCOUNTER
----- Message from Tang Hooker sent at 10/9/2018  3:25 PM CDT -----  Contact: self [phone]  Would like to be worked in for an appointment with Dr. Rodriguez for nausea and constipation, before next available of 11/07. Please call back at [phone].  Md Jorje

## 2018-10-12 ENCOUNTER — OFFICE VISIT (OUTPATIENT)
Dept: INTERNAL MEDICINE | Facility: CLINIC | Age: 38
End: 2018-10-12
Payer: MEDICAID

## 2018-10-12 ENCOUNTER — LAB VISIT (OUTPATIENT)
Dept: LAB | Facility: HOSPITAL | Age: 38
End: 2018-10-12
Attending: INTERNAL MEDICINE
Payer: MEDICAID

## 2018-10-12 ENCOUNTER — TELEPHONE (OUTPATIENT)
Dept: OBSTETRICS AND GYNECOLOGY | Facility: CLINIC | Age: 38
End: 2018-10-12

## 2018-10-12 VITALS
HEIGHT: 62 IN | TEMPERATURE: 99 F | SYSTOLIC BLOOD PRESSURE: 110 MMHG | BODY MASS INDEX: 33.1 KG/M2 | DIASTOLIC BLOOD PRESSURE: 80 MMHG | HEART RATE: 104 BPM | RESPIRATION RATE: 18 BRPM | OXYGEN SATURATION: 99 % | WEIGHT: 179.88 LBS

## 2018-10-12 DIAGNOSIS — E55.9 VITAMIN D DEFICIENCY: ICD-10-CM

## 2018-10-12 DIAGNOSIS — L68.9 EXCESSIVE HAIR GROWTH: ICD-10-CM

## 2018-10-12 DIAGNOSIS — Z00.00 ANNUAL PHYSICAL EXAM: ICD-10-CM

## 2018-10-12 DIAGNOSIS — R73.03 PREDIABETES: Primary | ICD-10-CM

## 2018-10-12 DIAGNOSIS — R73.03 PREDIABETES: ICD-10-CM

## 2018-10-12 DIAGNOSIS — K59.00 CONSTIPATION, UNSPECIFIED CONSTIPATION TYPE: ICD-10-CM

## 2018-10-12 LAB
25(OH)D3+25(OH)D2 SERPL-MCNC: 25 NG/ML
ALBUMIN SERPL BCP-MCNC: 3.8 G/DL
ALP SERPL-CCNC: 63 U/L
ALT SERPL W/O P-5'-P-CCNC: 18 U/L
ANION GAP SERPL CALC-SCNC: 10 MMOL/L
AST SERPL-CCNC: 18 U/L
BASOPHILS # BLD AUTO: 0.03 K/UL
BASOPHILS NFR BLD: 0.5 %
BILIRUB SERPL-MCNC: 0.7 MG/DL
BUN SERPL-MCNC: 7 MG/DL
CALCIUM SERPL-MCNC: 9.6 MG/DL
CHLORIDE SERPL-SCNC: 108 MMOL/L
CHOLEST SERPL-MCNC: 166 MG/DL
CHOLEST/HDLC SERPL: 3.6 {RATIO}
CO2 SERPL-SCNC: 20 MMOL/L
CREAT SERPL-MCNC: 0.9 MG/DL
DIFFERENTIAL METHOD: NORMAL
EOSINOPHIL # BLD AUTO: 0.1 K/UL
EOSINOPHIL NFR BLD: 2.1 %
ERYTHROCYTE [DISTWIDTH] IN BLOOD BY AUTOMATED COUNT: 13.4 %
EST. GFR  (AFRICAN AMERICAN): >60 ML/MIN/1.73 M^2
EST. GFR  (NON AFRICAN AMERICAN): >60 ML/MIN/1.73 M^2
ESTIMATED AVG GLUCOSE: 108 MG/DL
GLUCOSE SERPL-MCNC: 89 MG/DL
HBA1C MFR BLD HPLC: 5.4 %
HCT VFR BLD AUTO: 40.4 %
HDLC SERPL-MCNC: 46 MG/DL
HDLC SERPL: 27.7 %
HGB BLD-MCNC: 13.1 G/DL
IMM GRANULOCYTES # BLD AUTO: 0.01 K/UL
IMM GRANULOCYTES NFR BLD AUTO: 0.2 %
LDLC SERPL CALC-MCNC: 106 MG/DL
LYMPHOCYTES # BLD AUTO: 2.4 K/UL
LYMPHOCYTES NFR BLD: 40.9 %
MCH RBC QN AUTO: 29.4 PG
MCHC RBC AUTO-ENTMCNC: 32.4 G/DL
MCV RBC AUTO: 91 FL
MONOCYTES # BLD AUTO: 0.4 K/UL
MONOCYTES NFR BLD: 6.6 %
NEUTROPHILS # BLD AUTO: 2.9 K/UL
NEUTROPHILS NFR BLD: 49.7 %
NONHDLC SERPL-MCNC: 120 MG/DL
NRBC BLD-RTO: 0 /100 WBC
PLATELET # BLD AUTO: 232 K/UL
PMV BLD AUTO: 11.3 FL
POTASSIUM SERPL-SCNC: 4.2 MMOL/L
PROT SERPL-MCNC: 7.1 G/DL
RBC # BLD AUTO: 4.45 M/UL
SODIUM SERPL-SCNC: 138 MMOL/L
T4 FREE SERPL-MCNC: 1.1 NG/DL
TRIGL SERPL-MCNC: 70 MG/DL
TSH SERPL DL<=0.005 MIU/L-ACNC: 1.12 UIU/ML
WBC # BLD AUTO: 5.74 K/UL

## 2018-10-12 PROCEDURE — 99214 OFFICE O/P EST MOD 30 MIN: CPT | Mod: PBBFAC,PN,25 | Performed by: INTERNAL MEDICINE

## 2018-10-12 PROCEDURE — 84443 ASSAY THYROID STIM HORMONE: CPT

## 2018-10-12 PROCEDURE — 83036 HEMOGLOBIN GLYCOSYLATED A1C: CPT

## 2018-10-12 PROCEDURE — 82040 ASSAY OF SERUM ALBUMIN: CPT

## 2018-10-12 PROCEDURE — 84270 ASSAY OF SEX HORMONE GLOBUL: CPT

## 2018-10-12 PROCEDURE — 36415 COLL VENOUS BLD VENIPUNCTURE: CPT | Mod: PO

## 2018-10-12 PROCEDURE — 85025 COMPLETE CBC W/AUTO DIFF WBC: CPT

## 2018-10-12 PROCEDURE — 82306 VITAMIN D 25 HYDROXY: CPT

## 2018-10-12 PROCEDURE — 99999 PR PBB SHADOW E&M-EST. PATIENT-LVL IV: CPT | Mod: PBBFAC,,, | Performed by: INTERNAL MEDICINE

## 2018-10-12 PROCEDURE — 90471 IMMUNIZATION ADMIN: CPT | Mod: PBBFAC,PN

## 2018-10-12 PROCEDURE — 99395 PREV VISIT EST AGE 18-39: CPT | Mod: S$PBB,,, | Performed by: INTERNAL MEDICINE

## 2018-10-12 PROCEDURE — 80053 COMPREHEN METABOLIC PANEL: CPT

## 2018-10-12 PROCEDURE — 84439 ASSAY OF FREE THYROXINE: CPT

## 2018-10-12 PROCEDURE — 80061 LIPID PANEL: CPT

## 2018-10-12 NOTE — LETTER
[facility]  [address] [place] [address] [address]   [place] [address]  Phone: [phone]           To whom it may concern:      Fco Watts received a flu shot today (10/12/2018) at [facility]. If you have any questions, please contact our office at [phone].        With regards,     Joycelyn Elena LPN

## 2018-10-12 NOTE — TELEPHONE ENCOUNTER
Spoke to pt,     Pt  Is requesting a call back from the nurse in  Regards to pt getting in to see the DR garcia per DR Rodriguez     Appt scheduled for 11/7/18, pt verbalized understanding.

## 2018-10-12 NOTE — TELEPHONE ENCOUNTER
----- Message from Abdirizak Camacho sent at 10/12/2018 10:10 AM CDT -----  Contact: pt   Pt  Is requesting a call back from the nurse in  Regards to pt getting in to see the DR garcia per DR Rodriguez   [phone] (home)

## 2018-10-12 NOTE — PROGRESS NOTES
Subjective:      Patient ID: Fco Watts is a 38 y.o. female.    Chief Complaint: Constipation      HPI     Ms. Fco Watts is a patient of Rajeev Rodriguez MD, who presents for 1 mo of intermittent abn discomfort, 5/10 intensity, each episode lasting until she is able to have a BM, which are hard, +constipation, last BM yesterday, no BRBPR. She has been taking OTC Bentyl, which helps. +nausea, no v/d. No recent dietary changes. 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       Past Medical History:   Diagnosis Date    Abnormal Pap smear of cervix     Thyroid disease     h/o hypothyroidism     Past Surgical History:   Procedure Laterality Date     SECTION  2010    DILATION AND CURETTAGE OF UTERUS      HYSTERECTOMY      Still has ovaries     Social History     Socioeconomic History    Marital status: Single     Spouse name: Not on file    Number of children: Not on file    Years of education: Not on file    Highest education level: Not on file   Social Needs    Financial resource strain: Not on file    Food insecurity - worry: Not on file    Food insecurity - inability: Not on file    Transportation needs - medical: Not on file    Transportation needs - non-medical: Not on file   Occupational History    Not on file   Tobacco Use    Smoking status: Never Smoker    Smokeless tobacco: Never Used   Substance and Sexual Activity    Alcohol use: Yes     Alcohol/week: 0.0 - 0.6 oz     Comment: 1 glass of wine every 4-5 mo    Drug use: No    Sexual activity: Yes     Partners: Male     Comment:    Other Topics Concern    Not on file   Social History Narrative    Breakfast: 5% (previously 25%): cereal or McDonalds: sausage biscuit or steak McMuffin (previously meal replacement shake); Cokes more than lemon water    Lunch: 75% Hamberger or pizza or fried chicken (previously grilled  "chicken or salad); 75% burger & chip (previous 25%); water    Dinner: Hamberger or pizza or fried chicken; 1 slice pizza; water    Snacks: candy or ice cream 1x/d or chips 2x/wk; (previoulsy Star crunch pie)     Eats out: 5x/wk (previously 3x/mo)    Water: 16 oz/d (previously 48-64)    PA: None (previously 3x/wk; each 1 hour)    Goal weight is 150 lbs     Family History   Problem Relation Age of Onset    Colon cancer Father     Cancer Father     Hypertension Mother     Breast cancer Neg Hx     Ovarian cancer Neg Hx        Current Outpatient Medications:     albuterol 90 mcg/actuation inhaler, Inhale 2 puffs into the lungs every 6 (six) hours as needed for Wheezing. Rescue, Disp: 18 g, Rfl: 0    brompheniramine-pseudoeph-DM (BROMFED DM) 2-30-10 mg/5 mL Syrp, Take 10 mLs by mouth every 6 to 8 hours as needed., Disp: 220 mL, Rfl: 0    ergocalciferol (VITAMIN D2) 50,000 unit Cap, Take 50,000 Units by mouth every 7 days., Disp: , Rfl:     linagliptin (TRADJENTA ORAL), Take by mouth., Disp: , Rfl:     naproxen (NAPROSYN) 500 MG tablet, Take 1 tablet (500 mg total) by mouth daily as needed (right neck pain)., Disp: , Rfl:     spironolactone (ALDACTONE) 25 MG tablet, Take 2 tablets (50 mg total) by mouth once daily., Disp: 30 tablet, Rfl: 11    Review of patient's allergies indicates:  No Known Allergies     Review of Systems   All remaining systems negative    Objective:     /80 (BP Location: Left arm, Patient Position: Sitting, BP Method: Large (Manual))   Pulse 104   Temp 98.6 °F (37 °C) (Tympanic)   Resp 18   Ht 5' 2" (1.575 m)   Wt 81.6 kg (179 lb 14.3 oz)   SpO2 99%   BMI 32.90 kg/m²     Physical Exam  GEN: A&O fully, NAD  PSYC: Normal affect  GI: S/NT/ND, normal bowel sounds      Lab Results   Component Value Date    WBC 5.74 10/12/2018    HGB 13.1 10/12/2018    HCT 40.4 10/12/2018     10/12/2018    CHOL 166 10/12/2018    TRIG 70 10/12/2018    HDL 46 10/12/2018    LDLCALC 106.0 " 10/12/2018    ALT 18 10/12/2018    AST 18 10/12/2018     10/12/2018    K 4.2 10/12/2018     10/12/2018    CREATININE 0.9 10/12/2018    BUN 7 10/12/2018    CO2 20 (L) 10/12/2018    CALCIUM 9.6 10/12/2018    HGBA1C 5.4 10/12/2018       Assessment:      1. Prediabetes: Will check A1c.    2. Vitamin D deficiency: Will check vitD.   3. Annual physical exam: HM: flu vax today.   4. Excessive hair growth: Will check testosterone panel.   5.      Constipation: Likely 2/2 low water (16 oz/d) and heavy meat & potato intake. Discussed strategies            for lifestyle modifications, particularly systematically increasing physical activity (5-90 min/episode,            3-5 times/week), water (1/2 of body weight in ounces) and avoiding potatoes, sugar sweetened            beverages, red/processed meats (including chicken), fast food, grains (rice/bread/pasta); and            increasing yogurt, whole fruits, unsalted nuts to 3-5 servings/day, and daily weight logging; non-starchy            vegetables, cooked beans, and un-fried seafood have weak effects on weight and improves constipation.    Plan:   Prediabetes  -     Hemoglobin A1c; Future; Expected date: 10/12/2018    Vitamin D deficiency  -     Vitamin D; Future; Expected date: 10/12/2018    Annual physical exam  -     CBC auto differential; Future; Expected date: 10/12/2018  -     Comprehensive metabolic panel; Future; Expected date: 10/12/2018  -     Lipid panel; Future; Expected date: 10/12/2018  -     T4, free; Future; Expected date: 10/12/2018  -     TSH; Future; Expected date: 10/12/2018  -     Ambulatory consult to Obstetrics / Gynecology    Excessive hair growth  -     Testosterone Panel; Future; Expected date: 10/12/2018    Constipation, unspecified constipation type    Other orders  -     Cancel: Influenza - Quadrivalent (3 years & older)  -     Influenza - Quadrivalent (3 years & older) (PF)        Follow-up in about 3 months (around 1/12/2019), or  if symptoms worsen or fail to improve, for FU on abn discomfort, nausea, increase hair growth.    I spent 25 minutes of time with patient 50% or more of which was discussing labs and plans of care.

## 2018-10-15 DIAGNOSIS — E55.9 VITAMIN D DEFICIENCY: ICD-10-CM

## 2018-10-18 LAB
ALBUMIN SERPL-MCNC: 4.4 G/DL (ref 3.6–5.1)
SHBG SERPL-SCNC: 43 NMOL/L (ref 17–124)
TESTOST FREE SERPL-MCNC: 1.6 PG/ML (ref 0.2–5)
TESTOST SERPL-MCNC: 17 NG/DL (ref 2–45)
TESTOSTERONE.FREE+WB SERPL-MCNC: 3.3 NG/DL (ref 0.5–8.5)

## 2018-10-19 ENCOUNTER — TELEPHONE (OUTPATIENT)
Dept: OBSTETRICS AND GYNECOLOGY | Facility: CLINIC | Age: 38
End: 2018-10-19

## 2018-10-19 ENCOUNTER — TELEPHONE (OUTPATIENT)
Dept: INTERNAL MEDICINE | Facility: CLINIC | Age: 38
End: 2018-10-19

## 2018-10-19 NOTE — TELEPHONE ENCOUNTER
Spoke to pt,     Please give pt a call at ..[phone] (home) regarding some complications she's having and she wants to see if she can be seen sooner.      Appt scheduled for 10/22/18 with Laura. Pt verbalized understanding.

## 2018-10-19 NOTE — TELEPHONE ENCOUNTER
Spoke with pt, let her know lab work reasults as stated by  in letter. She stated she was wondering about the thyroid medication because she is in school right now and they told her any pt that is ever prescribed thyroid medication would never get back off of it. Pt wanted to make sure even tho her thyroid has been normal since we first checked in 12/2017. Please review and advise.

## 2018-10-19 NOTE — TELEPHONE ENCOUNTER
----- Message from Hilda Ortiz sent at 10/19/2018 11:40 AM CDT -----  Contact: Pt  Please give pt a call at ..[phone] (home) she is calling to get her results.

## 2018-10-19 NOTE — TELEPHONE ENCOUNTER
----- Message from Hilda Ortiz sent at 10/19/2018 11:40 AM CDT -----  Contact: Pt  Please give pt a call at ..[phone] (home) regarding some complications she's having (Pt wouldn't give details) and she wants to see if she can be seen sooner

## 2018-10-30 ENCOUNTER — TELEPHONE (OUTPATIENT)
Dept: INTERNAL MEDICINE | Facility: CLINIC | Age: 38
End: 2018-10-30

## 2018-10-30 NOTE — TELEPHONE ENCOUNTER
----- Message from Rayo Hsieh sent at 10/30/2018  1:56 PM CDT -----  Pt is requesting a call from nurse to discuss a work in appt.          Please call pt back at [phone]

## 2018-10-31 ENCOUNTER — OFFICE VISIT (OUTPATIENT)
Dept: INTERNAL MEDICINE | Facility: CLINIC | Age: 38
End: 2018-10-31
Payer: MEDICAID

## 2018-10-31 VITALS
OXYGEN SATURATION: 97 % | SYSTOLIC BLOOD PRESSURE: 110 MMHG | RESPIRATION RATE: 20 BRPM | HEIGHT: 62 IN | WEIGHT: 179.44 LBS | BODY MASS INDEX: 33.02 KG/M2 | TEMPERATURE: 97 F | DIASTOLIC BLOOD PRESSURE: 70 MMHG | HEART RATE: 100 BPM

## 2018-10-31 DIAGNOSIS — R68.89 FLU-LIKE SYMPTOMS: Primary | ICD-10-CM

## 2018-10-31 LAB
FLUAV AG SPEC QL IA: NEGATIVE
FLUBV AG SPEC QL IA: NEGATIVE
SPECIMEN SOURCE: NORMAL

## 2018-10-31 PROCEDURE — 99213 OFFICE O/P EST LOW 20 MIN: CPT | Mod: S$PBB,,, | Performed by: INTERNAL MEDICINE

## 2018-10-31 PROCEDURE — 99213 OFFICE O/P EST LOW 20 MIN: CPT | Mod: PBBFAC,PN | Performed by: INTERNAL MEDICINE

## 2018-10-31 PROCEDURE — 87400 INFLUENZA A/B EACH AG IA: CPT

## 2018-10-31 PROCEDURE — 99999 PR PBB SHADOW E&M-EST. PATIENT-LVL III: CPT | Mod: PBBFAC,,, | Performed by: INTERNAL MEDICINE

## 2018-10-31 RX ORDER — OSELTAMIVIR PHOSPHATE 75 MG/1
75 CAPSULE ORAL 2 TIMES DAILY
Qty: 10 CAPSULE | Refills: 0 | Status: SHIPPED | OUTPATIENT
Start: 2018-10-31 | End: 2018-11-05

## 2018-10-31 RX ORDER — CODEINE PHOSPHATE AND GUAIFENESIN 10; 100 MG/5ML; MG/5ML
5 SOLUTION ORAL 2 TIMES DAILY PRN
Qty: 240 ML | Refills: 0 | Status: SHIPPED | OUTPATIENT
Start: 2018-10-31 | End: 2019-05-03

## 2018-10-31 NOTE — PROGRESS NOTES
Subjective:      Patient ID: Fco Watts is a 38 y.o. female.    Chief Complaint: Cough; Sore Throat; and Nasal Congestion      HPI     Ms. Fco Watts is a patient of Rajeev Rodriguez MD, who presents for a dry cough, throat discomfort (a sensation of something in her throat) and nasal congestion over the past 4 days. +subjective fever and sweats on yesterday and Monday night, but didn't check her temp. She reports severe body soreness on  precluding her getting out of bed. No chills.     She reports an improvement in her anxiety.      Past Medical History:   Diagnosis Date    Abnormal Pap smear of cervix     Dyslipidemia     low HDL    Thyroid disease     h/o hypothyroidism    Vitamin D deficiency      Past Surgical History:   Procedure Laterality Date     SECTION  2010    DILATION AND CURETTAGE OF UTERUS      HYSTERECTOMY      Still has ovaries     Social History     Socioeconomic History    Marital status: Single     Spouse name: Not on file    Number of children: Not on file    Years of education: Not on file    Highest education level: Not on file   Social Needs    Financial resource strain: Not on file    Food insecurity - worry: Not on file    Food insecurity - inability: Not on file    Transportation needs - medical: Not on file    Transportation needs - non-medical: Not on file   Occupational History    Not on file   Tobacco Use    Smoking status: Never Smoker    Smokeless tobacco: Never Used   Substance and Sexual Activity    Alcohol use: Yes     Alcohol/week: 0.0 - 0.6 oz     Comment: 1 glass of wine every 4-5 mo    Drug use: No    Sexual activity: Yes     Partners: Male     Comment:    Other Topics Concern    Not on file   Social History Narrative    Breakfast: 5% (previously 25%): cereal or McDonalds: sausage biscuit or steak McMuffin (previously meal replacement shake); Cokes more than lemon water    Lunch: 75% Hamberger or pizza or  "fried chicken (previously grilled chicken or salad); 75% burger & chip (previous 25%); water    Dinner: Hamberger or pizza or fried chicken; 1 slice pizza; water    Snacks: candy or ice cream 1x/d or chips 2x/wk; (previoulsy Star crunch pie)     Eats out: 5x/wk (previously 3x/mo)    Water: 16 oz/d (previously 48-64)    PA: None (previously 3x/wk; each 1 hour)    Goal weight is 150 lbs     Family History   Problem Relation Age of Onset    Colon cancer Father     Cancer Father     Hypertension Mother     Breast cancer Neg Hx     Ovarian cancer Neg Hx        Current Outpatient Medications:     ergocalciferol (VITAMIN D2) 50,000 unit Cap, Take 50,000 Units by mouth every 7 days., Disp: , Rfl:     guaifenesin-codeine 100-10 mg/5 ml (TUSSI-ORGANIDIN NR)  mg/5 mL syrup, Take 5 mLs by mouth 2 (two) times daily as needed for Cough., Disp: 240 mL, Rfl: 0    naproxen (NAPROSYN) 500 MG tablet, Take 1 tablet (500 mg total) by mouth daily as needed (right neck pain)., Disp: , Rfl:     oseltamivir (TAMIFLU) 75 MG capsule, Take 1 capsule (75 mg total) by mouth 2 (two) times daily. for 5 days, Disp: 10 capsule, Rfl: 0    spironolactone (ALDACTONE) 25 MG tablet, Take 2 tablets (50 mg total) by mouth once daily., Disp: 30 tablet, Rfl: 11    Review of patient's allergies indicates:  No Known Allergies     Review of Systems   All remaining systems negative    Objective:     /70 (BP Location: Left arm, Patient Position: Sitting, BP Method: Large (Manual))   Pulse 100   Temp 97.3 °F (36.3 °C) (Tympanic)   Resp 20   Ht 5' 2" (1.575 m)   Wt 81.4 kg (179 lb 7.3 oz)   LMP 10/15/2018   SpO2 97%   BMI 32.82 kg/m²     Physical Exam  GEN: A&O fully, NAD  PSYC: Normal affect  HEENT: OP: Clear, no LAD, no thyroid masses, auditory canals and TMs WNL  CV: RRR, no M/G/R  PULM: CTA bilaterally, no wheezes, rales, crackles       Lab Results   Component Value Date    WBC 5.74 10/12/2018    HGB 13.1 10/12/2018    HCT 40.4 " 10/12/2018     10/12/2018    CHOL 166 10/12/2018    TRIG 70 10/12/2018    HDL 46 10/12/2018    LDLCALC 106.0 10/12/2018    ALT 18 10/12/2018    AST 18 10/12/2018     10/12/2018    K 4.2 10/12/2018     10/12/2018    CREATININE 0.9 10/12/2018    BUN 7 10/12/2018    CO2 20 (L) 10/12/2018    CALCIUM 9.6 10/12/2018    HGBA1C 5.4 10/12/2018       Assessment:      1. 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 She understands that she is outside of the 48 hour window for treatment of flu, but prefers to take oseltamivir anyway. Given the classic sx and high risk of exposing others, will check for flu and empirically tx. Risks and benefits discussed and patient chose to move forward with oseltamivir 75 mg 1 po bid x5d, Robitussin AC po q8 prn. Recommended the following lifestyle modification:      Ginger/lemon/honey Juice          Ingredients (preferably organic & local):    3-5 Ginger roots   3 meghana and honey      Preparation:    Ginger root   - Wash   - Chop   - Add to  with an equal proportion of water   - Blend   - Strain   - Pour to fill ¾ of any size container (i.e. glass bottle)    Meghaan   - Squeeze meghana    - Pour juice to fill ¼ of glass bottle    Add honey to glass bottle to taste      Storage & Application    Refrigerate   Enjoy   - Shake & sip as needed for cough, congestion, sore throat   - Avoid drinking >3 oz in one sitting, which can lead to gastrointestinal irritation         Plan:   Flu-like symptoms  -     guaifenesin-codeine 100-10 mg/5 ml (TUSSI-ORGANIDIN NR)  mg/5 mL syrup; Take 5 mLs by mouth 2 (two) times daily as needed for Cough.  Dispense: 240 mL; Refill: 0  -     Influenza A & B by Molecular  -     oseltamivir (TAMIFLU) 75 MG capsule; Take 1 capsule (75 mg total) by mouth 2 (two) times daily. for 5 days  Dispense: 10 capsule; Refill: 0        No Follow-up on file.    I spent 25  minutes of time with patient 50% or more of which was discussing labs and plans of care.

## 2018-11-06 ENCOUNTER — TELEPHONE (OUTPATIENT)
Dept: INTERNAL MEDICINE | Facility: CLINIC | Age: 38
End: 2018-11-06

## 2018-11-06 DIAGNOSIS — R05.9 COUGH: Primary | ICD-10-CM

## 2018-11-06 RX ORDER — AZITHROMYCIN 250 MG/1
TABLET, FILM COATED ORAL
Qty: 6 TABLET | Refills: 0 | Status: SHIPPED | OUTPATIENT
Start: 2018-11-06 | End: 2018-11-11

## 2018-11-06 NOTE — TELEPHONE ENCOUNTER
Spoke with pt, she stated she is still having symptoms of sore throat, cough, and nasal congestion. Pt stated she was told to call back if she didn't get to feeling better. Pt stated she would like something called in. Please review and advise.

## 2018-11-06 NOTE — TELEPHONE ENCOUNTER
----- Message from Abdirizak Camacho sent at 11/5/2018  3:38 PM CST -----  Contact: pt   Pt is requesting a call back from the nurse in regards to the pt not getting better and needing something else called in for her to her pharmacy   [phone] (home)     [id number] - [place], [place] - [address]  [address] [address]  Phone: [phone] Fax: [phone]

## 2018-11-07 ENCOUNTER — OFFICE VISIT (OUTPATIENT)
Dept: OBSTETRICS AND GYNECOLOGY | Facility: CLINIC | Age: 38
End: 2018-11-07
Payer: MEDICAID

## 2018-11-07 ENCOUNTER — LAB VISIT (OUTPATIENT)
Dept: LAB | Facility: HOSPITAL | Age: 38
End: 2018-11-07
Attending: OBSTETRICS & GYNECOLOGY
Payer: MEDICAID

## 2018-11-07 VITALS
DIASTOLIC BLOOD PRESSURE: 60 MMHG | SYSTOLIC BLOOD PRESSURE: 114 MMHG | WEIGHT: 178.56 LBS | HEIGHT: 62 IN | BODY MASS INDEX: 32.86 KG/M2

## 2018-11-07 DIAGNOSIS — R68.82 DECREASED LIBIDO: ICD-10-CM

## 2018-11-07 DIAGNOSIS — L75.0 ABNORMAL BODY ODOR: ICD-10-CM

## 2018-11-07 DIAGNOSIS — N89.8 VAGINAL DISCHARGE: ICD-10-CM

## 2018-11-07 DIAGNOSIS — L65.9 FRONTAL BALDING: ICD-10-CM

## 2018-11-07 DIAGNOSIS — Z01.419 ENCOUNTER FOR GYNECOLOGICAL EXAMINATION (GENERAL) (ROUTINE) WITHOUT ABNORMAL FINDINGS: Primary | ICD-10-CM

## 2018-11-07 DIAGNOSIS — L68.0 FEMALE HIRSUTISM: ICD-10-CM

## 2018-11-07 LAB — FSH SERPL-ACNC: 3.9 MIU/ML

## 2018-11-07 PROCEDURE — 87660 TRICHOMONAS VAGIN DIR PROBE: CPT

## 2018-11-07 PROCEDURE — 83001 ASSAY OF GONADOTROPIN (FSH): CPT

## 2018-11-07 PROCEDURE — 99395 PREV VISIT EST AGE 18-39: CPT | Mod: S$PBB,,, | Performed by: OBSTETRICS & GYNECOLOGY

## 2018-11-07 PROCEDURE — 36415 COLL VENOUS BLD VENIPUNCTURE: CPT | Mod: PO

## 2018-11-07 PROCEDURE — 99213 OFFICE O/P EST LOW 20 MIN: CPT | Mod: PBBFAC,PN | Performed by: OBSTETRICS & GYNECOLOGY

## 2018-11-07 PROCEDURE — 99999 PR PBB SHADOW E&M-EST. PATIENT-LVL III: CPT | Mod: PBBFAC,,, | Performed by: OBSTETRICS & GYNECOLOGY

## 2018-11-07 NOTE — PROGRESS NOTES
Subjective:       Patient ID: Fco Watts is a 38 y.o. female.    Chief Complaint:  Well Woman (sweating, hair loss)      History of Present Illness  HPI  Annual Exam-Postmenopausal  Patient presents for annual exam. The patient reports increased mustiness under arms for years as well as worsening acne on face/back and increased hair on chin for years; also feels she is having hair thinning ; stopped getting perms 4 years ago;  The patient is sexually active.-denies pelvic pain; problems lubricating;also reports decreased libido;   GYN screening history: last pap: was normal and patient does not recall when last pap was. The patient has never been taking hormone replacement therapy. Patient denies post-menopausal vaginal bleeding. The patient wears seatbelts: yes. The patient participates in regular exercise: yes.--gym; --2 times/wk--30 min cardio/ 30 min weight;  Has the patient ever been transfused or tattooed?: yes.--tattooes; transfused at time of still birth;  The patient reports that there is not domestic violence in her life.    Feels not sleeping well but feels it is due to stress  Denies hot flushes, night sweat      Denies bladder leakage        GYN & OB History  Patient's last menstrual period was 10/15/2018.   Date of Last Pap: 2017    OB History    Para Term  AB Living   7 6       6   SAB TAB Ectopic Multiple Live Births           6      # Outcome Date GA Lbr Girish/2nd Weight Sex Delivery Anes PTL Lv   7          FD   6 Para      CS-LTranv   ANTONIO   5 Para      Vag-Spont   ANTONIO   4 Para      Vag-Spont   ANTONIO   3 Para      Vag-Spont   ANTONIO   2 Para      Vag-Spont   ANTONIO   1 Para      Vag-Spont   ANTONIO          Review of Systems  Review of Systems   Genitourinary: Positive for dyspareunia and vaginal odor.   Skin:  Positive for acne and hair changes.   All other systems reviewed and are negative.          Objective:      Physical Exam:   Constitutional: She appears  well-developed.   Male patterned balding     Eyes: Conjunctivae and EOM are normal. Pupils are equal, round, and reactive to light.    Neck: Normal range of motion. Neck supple.     Pulmonary/Chest: Effort normal. Right breast exhibits no mass, no nipple discharge, no skin change and no tenderness. Left breast exhibits no mass, no nipple discharge, no skin change and no tenderness. Breasts are symmetrical.        Abdominal: Soft.     Genitourinary: Rectum normal and vagina normal. Pelvic exam was performed with patient supine. Uterus is absent. Right adnexum displays no mass and no tenderness. Left adnexum displays no mass and no tenderness. No erythema, bleeding, rectocele, cystocele or unspecified prolapse of vaginal walls in the vagina. No vaginal discharge found. Vaginal cuff normal.Labial bartholins normal.Cervix exhibits absence.   Genitourinary Comments: atrophic           Musculoskeletal: Normal range of motion.       Neurological: She is alert.    Skin: Skin is warm.    Psychiatric: She has a normal mood and affect.           Assessment:     Encounter Diagnoses   Name Primary?    Female hirsutism     Encounter for gynecological examination (general) (routine) without abnormal findings Yes    Vaginal discharge     Frontal balding     Decreased libido     Abnormal body odor              Plan:      Continue annual well woman exam.  Pap not indicated due to hx of nml pap and hx of mayda  fsh today  Advised arrid xx dry antiper/deorderant  Advised f/u with derm for acne, electrolysis and balding  encouraged diet, exercise, weight loss

## 2018-11-07 NOTE — TELEPHONE ENCOUNTER
Spoke with pt, let her know rx was sent into pharmacy yesterday and encouraged her to take with food and increase yogurt intake for probiotics.

## 2018-11-08 ENCOUNTER — TELEPHONE (OUTPATIENT)
Dept: OBSTETRICS AND GYNECOLOGY | Facility: CLINIC | Age: 38
End: 2018-11-08

## 2018-11-08 NOTE — TELEPHONE ENCOUNTER
----- Message from Irma Long MD sent at 11/8/2018  8:48 AM CST -----  Please advise fsh does not show menopause; advise follow up with derm for scalp balding, acne and facial hair; also Please advise the  vaginal cultures are negative for bacteria, yeast and trichomonas.

## 2019-01-02 ENCOUNTER — PATIENT OUTREACH (OUTPATIENT)
Dept: ADMINISTRATIVE | Facility: HOSPITAL | Age: 39
End: 2019-01-02

## 2019-05-02 ENCOUNTER — TELEPHONE (OUTPATIENT)
Dept: INTERNAL MEDICINE | Facility: CLINIC | Age: 39
End: 2019-05-02

## 2019-05-02 NOTE — TELEPHONE ENCOUNTER
----- Message from Tanika Martinez sent at 5/2/2019  3:35 PM CDT -----  Contact: self [phone]  Type:  Sooner Apoointment Request    Caller is requesting a sooner appointment.  Caller declined first available appointment listed below.  Caller will not accept being placed on the waitlist and is requesting a message be sent to doctor.  Name of Caller:Fco Watts  When is the first available appointment?07/10/19  Symptoms:allergies  Would the patient rather a call back or a response via [facility]? Call back   Best Call Back Number:[phone]  Additional Information: Pt is an established medicaid pt

## 2019-05-02 NOTE — TELEPHONE ENCOUNTER
----- Message from Jacoby Mercer sent at 5/2/2019  4:19 PM CDT -----  Contact: Pt   Pt is calling ..Type:  Patient Returning Call    Who Called:Pt   Who Left Message for Patient:nurse   Does the patient know what this is regarding?: calling to fit Pt into schedule   Would the patient rather a call back or a response via [facility]? Call back   Best Call Back Number:[phone]           ..Thank You  Stephanie Mercer

## 2019-05-02 NOTE — TELEPHONE ENCOUNTER
----- Message from Jacoby Mercer sent at 5/2/2019  3:42 PM CDT -----  Contact: Pt   Pt is calling .Type:  Sooner Apoointment Request    Pt is requesting a sooner appointment.  Pt  declined first available appointment listed below.  Caller will not accept being placed on the waitlist and is requesting a message be sent to doctor.  Name of Caller: Pt   When is the first available appointment? July 2019  Symptoms: sinus allgeries   Would the patient rather a call back or a response via [facility]? Call back   Best Call Back Number:[phone]         ..Thank You  Stephanie Mercer

## 2019-05-03 ENCOUNTER — OFFICE VISIT (OUTPATIENT)
Dept: INTERNAL MEDICINE | Facility: CLINIC | Age: 39
End: 2019-05-03
Payer: MEDICAID

## 2019-05-03 VITALS
TEMPERATURE: 99 F | HEIGHT: 62 IN | DIASTOLIC BLOOD PRESSURE: 70 MMHG | RESPIRATION RATE: 18 BRPM | BODY MASS INDEX: 32.88 KG/M2 | OXYGEN SATURATION: 99 % | SYSTOLIC BLOOD PRESSURE: 106 MMHG | WEIGHT: 178.69 LBS | HEART RATE: 97 BPM

## 2019-05-03 DIAGNOSIS — F90.0 ATTENTION DEFICIT HYPERACTIVITY DISORDER (ADHD), PREDOMINANTLY INATTENTIVE TYPE: Primary | ICD-10-CM

## 2019-05-03 DIAGNOSIS — E66.9 OBESITY (BMI 30.0-34.9): ICD-10-CM

## 2019-05-03 DIAGNOSIS — L70.9 ACNE, UNSPECIFIED ACNE TYPE: ICD-10-CM

## 2019-05-03 DIAGNOSIS — J30.2 SEASONAL ALLERGIES: ICD-10-CM

## 2019-05-03 PROCEDURE — 99999 PR PBB SHADOW E&M-EST. PATIENT-LVL III: ICD-10-PCS | Mod: PBBFAC,,, | Performed by: INTERNAL MEDICINE

## 2019-05-03 PROCEDURE — 99999 PR PBB SHADOW E&M-EST. PATIENT-LVL III: CPT | Mod: PBBFAC,,, | Performed by: INTERNAL MEDICINE

## 2019-05-03 PROCEDURE — 99214 OFFICE O/P EST MOD 30 MIN: CPT | Mod: S$PBB,,, | Performed by: INTERNAL MEDICINE

## 2019-05-03 PROCEDURE — 99214 PR OFFICE/OUTPT VISIT, EST, LEVL IV, 30-39 MIN: ICD-10-PCS | Mod: S$PBB,,, | Performed by: INTERNAL MEDICINE

## 2019-05-03 PROCEDURE — 99213 OFFICE O/P EST LOW 20 MIN: CPT | Mod: PBBFAC,PN | Performed by: INTERNAL MEDICINE

## 2019-05-03 RX ORDER — TRETINOIN 0.1 MG/G
GEL TOPICAL NIGHTLY
Qty: 45 G | Refills: 0 | Status: SHIPPED | OUTPATIENT
Start: 2019-05-03 | End: 2019-06-20

## 2019-05-03 RX ORDER — DEXTROAMPHETAMINE SACCHARATE, AMPHETAMINE ASPARTATE MONOHYDRATE, DEXTROAMPHETAMINE SULFATE AND AMPHETAMINE SULFATE 2.5; 2.5; 2.5; 2.5 MG/1; MG/1; MG/1; MG/1
10 CAPSULE, EXTENDED RELEASE ORAL EVERY MORNING
Qty: 28 CAPSULE | Refills: 0 | Status: SHIPPED | OUTPATIENT
Start: 2019-05-03 | End: 2019-09-27 | Stop reason: DRUGHIGH

## 2019-05-03 RX ORDER — DEXTROAMPHETAMINE SACCHARATE, AMPHETAMINE ASPARTATE MONOHYDRATE, DEXTROAMPHETAMINE SULFATE AND AMPHETAMINE SULFATE 2.5; 2.5; 2.5; 2.5 MG/1; MG/1; MG/1; MG/1
10 CAPSULE, EXTENDED RELEASE ORAL EVERY MORNING
Qty: 28 CAPSULE | Refills: 0
Start: 2019-05-03 | End: 2019-05-03 | Stop reason: SDUPTHER

## 2019-05-03 RX ORDER — AZELASTINE 1 MG/ML
1 SPRAY, METERED NASAL 2 TIMES DAILY PRN
Qty: 30 ML | Refills: 0 | Status: SHIPPED | OUTPATIENT
Start: 2019-05-03 | End: 2019-06-11 | Stop reason: SDUPTHER

## 2019-05-03 NOTE — PROGRESS NOTES
Subjective:      Patient ID: Fco Watts is a 38 y.o. female.    Chief Complaint: Allergies and Acne      HPI     Ms. Fco Watts is a patient of Rajeev Rodriguez MD, who presents for Allergies and Acne  She notes nasal congestion, sneezing and watery eyes and HA in the left forehead area over the past 2 days, which she feels is typical for this time of the year. She has not tried OTCs b/c she has tried Claritin, which didn't work.     She reports an increase in her acne over the past, which she wonders if it is due to increased work-related stress. She notes seeing her previous PCP, Dr. Dorita Arce for inattention, for which she was prescribed a 1 month supply of Adderall at a time when she wasn't able to be seen here.     VS, labs & imaging reviewed and discussed with patient.        Past Medical History:   Diagnosis Date    Abnormal Pap smear of cervix     ADD (attention deficit disorder) without hyperactivity 2019    Prescribed Adderall 10 1 po qd by her previous PCP, Dr. Dorita Cota, which she takes prn inattention while studying    Attention deficit hyperactivity disorder (ADHD), predominantly inattentive type 5/3/2019    Dyslipidemia     low HDL    Thyroid disease     h/o hypothyroidism    Vitamin D deficiency      Past Surgical History:   Procedure Laterality Date     SECTION  2010    DILATION AND CURETTAGE OF UTERUS      stillbirth    HYSTERECTOMY      Still has ovaries (hx abnl pap); mayda     Social History     Socioeconomic History    Marital status: Single     Spouse name: Not on file    Number of children: Not on file    Years of education: Not on file    Highest education level: Not on file   Occupational History    Not on file   Social Needs    Financial resource strain: Not on file    Food insecurity:     Worry: Not on file     Inability: Not on file    Transportation needs:     Medical: Not on file     Non-medical: Not on file    Tobacco Use    Smoking status: Never Smoker    Smokeless tobacco: Never Used   Substance and Sexual Activity    Alcohol use: Yes     Alcohol/week: 0.0 - 0.6 oz     Comment: 1 glass of wine every 4-5 mo    Drug use: No    Sexual activity: Yes     Partners: Male     Comment:    Lifestyle    Physical activity:     Days per week: Not on file     Minutes per session: Not on file    Stress: Not on file   Relationships    Social connections:     Talks on phone: Not on file     Gets together: Not on file     Attends Islam service: Not on file     Active member of club or organization: Not on file     Attends meetings of clubs or organizations: Not on file     Relationship status: Not on file   Other Topics Concern    Not on file   Social History Narrative    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); kids are 23 years, who is an RN        Breakfast: Skips (previously 5%, previously 25%: cereal or McDonalds: sausage biscuit or steak McMuffin (previously meal replacement shake); unsweetened lemon water (previously Cokes)    Lunch: 75% Hamberger or pizza or fried chicken (previously grilled chicken or salad); 75% burger & chip (previous 25%); water    Dinner: Home cooked: baked chicken or hamberger steak & gravy (occasionally 1 slice pizza or fried chicken); cranberry juice or soda or water    Snacks: Candy bar 2-3x/wk (previously ice cream 1x/d or chips 2x/wk or previoulsy Star crunch pie)     Eats out: 3x/m (previously 5x/wk)    Water: 36 oz/d (previously 16 oz/d, previously 48-64)    PA: None (previously 3x/wk; each 1 hour)    Goal weight is 150 lbs     Family History   Problem Relation Age of Onset    Colon cancer Father     Cancer Father     Hypertension Mother     Breast cancer Neg Hx     Ovarian cancer Neg Hx        Current Outpatient Medications:     azelastine (ASTELIN) 137 mcg (0.1 %) nasal spray, 1 spray (137 mcg total) by Nasal route 2 (two) times  "daily as needed for Rhinitis., Disp: 30 mL, Rfl: 0    dextroamphetamine-amphetamine (ADDERALL XR) 10 MG 24 hr capsule, Take 1 capsule (10 mg total) by mouth every morning., Disp: 28 capsule, Rfl: 0    tretinoin (RETIN-A) 0.01 % gel, Apply topically every evening., Disp: 45 g, Rfl: 0    Review of patient's allergies indicates:  No Known Allergies     Review of Systems   All remaining systems negative    Objective:     /70 (BP Location: Left arm, Patient Position: Sitting, BP Method: Large (Manual))   Pulse 97   Temp 98.6 °F (37 °C) (Tympanic)   Resp 18   Ht 5' 2" (1.575 m)   Wt 81.1 kg (178 lb 10.9 oz)   LMP  (LMP Unknown)   SpO2 99%   BMI 32.68 kg/m²     Physical Exam  GEN: A&O fully, NAD  PSYC: Normal affect  HEENT: OP: Clear, no LAD, no thyroid masses, auditory canals and TMs WNL  CV: RRR, no M/G/R  PULM: CTA bilaterally, no wheezes, rales, crackles       Lab Results   Component Value Date    WBC 5.74 10/12/2018    HGB 13.1 10/12/2018    HCT 40.4 10/12/2018     10/12/2018    CHOL 166 10/12/2018    TRIG 70 10/12/2018    HDL 46 10/12/2018    LDLCALC 106.0 10/12/2018    ALT 18 10/12/2018    AST 18 10/12/2018     10/12/2018    K 4.2 10/12/2018     10/12/2018    CREATININE 0.9 10/12/2018    BUN 7 10/12/2018    CO2 20 (L) 10/12/2018    CALCIUM 9.6 10/12/2018    HGBA1C 5.4 10/12/2018       Assessment:      1. Attention deficit hyperactivity disorder (ADHD), predominantly inattentive type    2. Seasonal allergies: LSM discussed. Risks and benefits discussed and patient chose to move forward with azelastine nasal spray BID prn.    3. Acne, unspecified acne type: LSM discussed. Risks and benefits discussed and patient chose to move forward with tretinonin 0.01% gel qhs for now. She is scheduled for a derm appointment this month.     4. Obesity (BMI 30.0-34.9): Discussed strategies for lifestyle modifications, particularly systematically increasing physical activity (5-90 min/episode, 3-5 " times/week), water (1/2 of body weight in ounces) and avoiding potatoes, sugar sweetened beverages, red/processed meats (including chicken), fast food, grains (rice/bread/pasta); and increasing yogurt, whole fruits, unsalted nuts to 3-5 servings/day, and daily weight logging; non-starchy vegetables, cooked beans, and un-fried seafood have weak effects on weight.        Plan:   Attention deficit hyperactivity disorder (ADHD), predominantly inattentive type  -     Discontinue: dextroamphetamine-amphetamine (ADDERALL XR) 10 MG 24 hr capsule; Take 1 capsule (10 mg total) by mouth every morning.  Dispense: 28 capsule; Refill: 0  -     dextroamphetamine-amphetamine (ADDERALL XR) 10 MG 24 hr capsule; Take 1 capsule (10 mg total) by mouth every morning.  Dispense: 28 capsule; Refill: 0    Seasonal allergies  -     azelastine (ASTELIN) 137 mcg (0.1 %) nasal spray; 1 spray (137 mcg total) by Nasal route 2 (two) times daily as needed for Rhinitis.  Dispense: 30 mL; Refill: 0    Acne, unspecified acne type  -     tretinoin (RETIN-A) 0.01 % gel; Apply topically every evening.  Dispense: 45 g; Refill: 0    Obesity (BMI 30.0-34.9)      Follow up in about 1 month (around 5/31/2019), or if symptoms worsen or fail to improve, for FU on ADD, acne.    I spent 25 minutes of time with patient 50% or more of which was discussing labs and plans of care.

## 2019-06-11 DIAGNOSIS — J30.2 SEASONAL ALLERGIES: ICD-10-CM

## 2019-06-11 RX ORDER — AZELASTINE 1 MG/ML
1 SPRAY, METERED NASAL 2 TIMES DAILY PRN
Qty: 30 ML | Refills: 5 | Status: SHIPPED | OUTPATIENT
Start: 2019-06-11 | End: 2019-06-20

## 2019-06-17 ENCOUNTER — TELEPHONE (OUTPATIENT)
Dept: OBSTETRICS AND GYNECOLOGY | Facility: CLINIC | Age: 39
End: 2019-06-17

## 2019-06-17 NOTE — TELEPHONE ENCOUNTER
----- Message from Marti Carmichael sent at 6/17/2019 11:20 AM CDT -----  Contact: Self  Patient requesting to schedule appointment with Dr. Long. Please call patient back at [phone]

## 2019-06-17 NOTE — TELEPHONE ENCOUNTER
Returned call to patient.  She c/o vaginal discharge and lower abd pain, but says that she is off Thursday, 06/20/19, per patient.  Appt Thursday, 06/20/19 at 10:00 am, she confirmed appt, provider and location

## 2019-06-20 ENCOUNTER — HOSPITAL ENCOUNTER (OUTPATIENT)
Dept: RADIOLOGY | Facility: HOSPITAL | Age: 39
Discharge: HOME | End: 2019-06-20
Attending: OBSTETRICS & GYNECOLOGY
Payer: MEDICAID

## 2019-06-20 ENCOUNTER — OFFICE VISIT (OUTPATIENT)
Dept: OBSTETRICS AND GYNECOLOGY | Facility: CLINIC | Age: 39
End: 2019-06-20
Payer: MEDICAID

## 2019-06-20 VITALS
DIASTOLIC BLOOD PRESSURE: 62 MMHG | WEIGHT: 174.38 LBS | HEIGHT: 62 IN | BODY MASS INDEX: 32.09 KG/M2 | SYSTOLIC BLOOD PRESSURE: 118 MMHG

## 2019-06-20 DIAGNOSIS — N64.4 NIPPLE PAIN: ICD-10-CM

## 2019-06-20 DIAGNOSIS — N76.0 ACUTE VAGINITIS: Primary | ICD-10-CM

## 2019-06-20 PROBLEM — L65.9 ALOPECIA: Status: ACTIVE | Noted: 2019-05-15

## 2019-06-20 PROCEDURE — 99213 PR OFFICE/OUTPT VISIT, EST, LEVL III, 20-29 MIN: ICD-10-PCS | Mod: S$PBB,,, | Performed by: OBSTETRICS & GYNECOLOGY

## 2019-06-20 PROCEDURE — 76642 US BREAST BILATERAL LIMITED: ICD-10-PCS | Mod: 26,50,, | Performed by: RADIOLOGY

## 2019-06-20 PROCEDURE — 76642 ULTRASOUND BREAST LIMITED: CPT | Mod: TC,50

## 2019-06-20 PROCEDURE — 99999 PR PBB SHADOW E&M-EST. PATIENT-LVL III: CPT | Mod: PBBFAC,,, | Performed by: OBSTETRICS & GYNECOLOGY

## 2019-06-20 PROCEDURE — 99213 OFFICE O/P EST LOW 20 MIN: CPT | Mod: S$PBB,,, | Performed by: OBSTETRICS & GYNECOLOGY

## 2019-06-20 PROCEDURE — 77066 DX MAMMO INCL CAD BI: CPT | Mod: TC

## 2019-06-20 PROCEDURE — 87491 CHLMYD TRACH DNA AMP PROBE: CPT

## 2019-06-20 PROCEDURE — 99999 PR PBB SHADOW E&M-EST. PATIENT-LVL III: ICD-10-PCS | Mod: PBBFAC,,, | Performed by: OBSTETRICS & GYNECOLOGY

## 2019-06-20 PROCEDURE — 77062 MAMMO DIGITAL DIAGNOSTIC BILAT WITH TOMOSYNTHESIS_CAD: ICD-10-PCS | Mod: 26,,, | Performed by: RADIOLOGY

## 2019-06-20 PROCEDURE — 76642 ULTRASOUND BREAST LIMITED: CPT | Mod: 26,50,, | Performed by: RADIOLOGY

## 2019-06-20 PROCEDURE — 99213 OFFICE O/P EST LOW 20 MIN: CPT | Mod: PBBFAC,25 | Performed by: OBSTETRICS & GYNECOLOGY

## 2019-06-20 PROCEDURE — 87480 CANDIDA DNA DIR PROBE: CPT

## 2019-06-20 PROCEDURE — 77062 BREAST TOMOSYNTHESIS BI: CPT | Mod: 26,,, | Performed by: RADIOLOGY

## 2019-06-20 PROCEDURE — 77066 MAMMO DIGITAL DIAGNOSTIC BILAT WITH TOMOSYNTHESIS_CAD: ICD-10-PCS | Mod: 26,,, | Performed by: RADIOLOGY

## 2019-06-20 PROCEDURE — 87510 GARDNER VAG DNA DIR PROBE: CPT

## 2019-06-20 PROCEDURE — 77066 DX MAMMO INCL CAD BI: CPT | Mod: 26,,, | Performed by: RADIOLOGY

## 2019-06-20 RX ORDER — METRONIDAZOLE 500 MG/1
500 TABLET ORAL EVERY 12 HOURS
Qty: 14 TABLET | Refills: 0 | Status: SHIPPED | OUTPATIENT
Start: 2019-06-20 | End: 2019-06-27

## 2019-06-20 RX ORDER — TRETINOIN 0.25 MG/G
1 CREAM TOPICAL
COMMUNITY
Start: 2019-05-15 | End: 2019-07-29

## 2019-06-20 RX ORDER — SPIRONOLACTONE 100 MG/1
100 TABLET, FILM COATED ORAL DAILY
Refills: 2 | COMMUNITY
Start: 2019-05-15 | End: 2020-02-05 | Stop reason: ALTCHOICE

## 2019-06-20 NOTE — PROGRESS NOTES
"Subjective:       Fco Watts is a 38 y.o. female who presents for evaluation of vaginal discharge copious, clear and malodorous. Symptoms have been present for 1 week. Symptoms include odor. Menstrual pattern: bleeding none. Contraception: status post hysterectomy  STI Risk: Very low risk of STD exposure       Review of Systems  Constitutional: negative  Eyes: negative  Ears, nose, mouth, throat, and face: negative  Respiratory: negative  Cardiovascular: negative  Gastrointestinal: negative  Genitourinary:negative  Integument/breast: negative  Hematologic/lymphatic: negative  Musculoskeletal:negative  Neurological: negative  Behavioral/Psych: negative  Endocrine: negative  Allergic/Immunologic: negative      Objective:      /62   Ht 5' 2" (1.575 m)   Wt 79.1 kg (174 lb 6.1 oz)   BMI 31.90 kg/m²   General appearance: alert, appears stated age and cooperative  Head: Normocephalic, without obvious abnormality, atraumatic  Eyes: negative  Breasts: Inspection negative, No nipple retraction or dimpling, No nipple discharge or bleeding, No axillary or supraclavicular adenopathy, Normal to palpation without dominant masses, left breast larger than right  Abdomen: soft, non-tender; bowel sounds normal; no masses,  no organomegaly  Pelvic: cervix normal in appearance, external genitalia normal, no adnexal masses or tenderness, no cervical motion tenderness, positive findings: vaginal discharge:  copious, white, mucoid and malodorous, rectovaginal septum normal and uterus normal size, shape, and consistency  Extremities: extremities normal, atraumatic, no cyanosis or edema  Skin: Skin color, texture, turgor normal. No rashes or lesions  Neurologic: Grossly normal      Assessment:      Bacterial vaginosis.      Plan:      Symptomatic local care discussed.  Educational materials distributed.  Oral antibiotics see orders.    Vaginitis panel    "

## 2019-06-20 NOTE — PATIENT INSTRUCTIONS

## 2019-06-21 LAB
BACTERIAL VAGINOSIS DNA: NEGATIVE
CANDIDA GLABRATA DNA: NEGATIVE
CANDIDA KRUSEI DNA: NEGATIVE
CANDIDA RRNA VAG QL PROBE: POSITIVE
T VAGINALIS RRNA GENITAL QL PROBE: NEGATIVE

## 2019-06-22 LAB
C TRACH DNA SPEC QL NAA+PROBE: NOT DETECTED
N GONORRHOEA DNA SPEC QL NAA+PROBE: NOT DETECTED

## 2019-06-25 ENCOUNTER — TELEPHONE (OUTPATIENT)
Dept: OBSTETRICS AND GYNECOLOGY | Facility: CLINIC | Age: 39
End: 2019-06-25

## 2019-06-25 DIAGNOSIS — J30.2 SEASONAL ALLERGIES: ICD-10-CM

## 2019-06-25 RX ORDER — FLUCONAZOLE 150 MG/1
150 TABLET ORAL ONCE
Qty: 1 TABLET | Refills: 0 | Status: SHIPPED | OUTPATIENT
Start: 2019-06-25 | End: 2019-06-25

## 2019-06-25 NOTE — TELEPHONE ENCOUNTER
Called and informed patient that her mammogram and ultrasound was normal, and for yearly mammograms from now on.  Informed patient that the swab showed yeast, and that diflucan was sent to her pharmacy.  Patient verbalized understanding of all things discussed.

## 2019-06-25 NOTE — TELEPHONE ENCOUNTER
Patient is calling regarding her mammogram and swab results. Informed patient that I would send Dr. Irving a message to review and advise, then we would call her back.  Patient verbalized understanding.  Please advise.

## 2019-06-25 NOTE — TELEPHONE ENCOUNTER
----- Message from Ailyn Johnson sent at 6/25/2019  8:33 AM CDT -----  Contact: pt  She's calling in regards to test results ,pls call pt back at [phone] (home)

## 2019-06-25 NOTE — TELEPHONE ENCOUNTER
Patient's mammogram and ultrasound was normal, recommendation is for yearly mammograms from now on.  Patient's swab was positive for yeast. Diflucan has been sent to her pharmacy on file.  Thank you.

## 2019-06-26 RX ORDER — FLUCONAZOLE 150 MG/1
150 TABLET ORAL ONCE
Qty: 1 TABLET | Refills: 0 | Status: SHIPPED | OUTPATIENT
Start: 2019-06-26 | End: 2019-06-26

## 2019-06-26 RX ORDER — AZELASTINE 1 MG/ML
1 SPRAY, METERED NASAL 2 TIMES DAILY PRN
Qty: 30 ML | Refills: 11 | Status: SHIPPED | OUTPATIENT
Start: 2019-06-26 | End: 2019-07-29

## 2019-06-27 ENCOUNTER — TELEPHONE (OUTPATIENT)
Dept: OBSTETRICS AND GYNECOLOGY | Facility: CLINIC | Age: 39
End: 2019-06-27

## 2019-06-27 NOTE — TELEPHONE ENCOUNTER
Spoke with the patient and notified her that her gen probe was negative. Patient verbalized understanding, Aicha

## 2019-06-27 NOTE — TELEPHONE ENCOUNTER
----- Message from Tanika Martinez sent at 6/27/2019  8:50 AM CDT -----  Contact: self [phone]  Type:  Patient Returning Call    Who Called:Fco Watts  Who Left Message for Patient:unk  Does the patient know what this is regarding?: unk  Would the patient rather a call back or a response via [facility]? Call back   Best Call Back Number:[phone]  Additional Information:

## 2019-07-02 ENCOUNTER — TELEPHONE (OUTPATIENT)
Dept: OBSTETRICS AND GYNECOLOGY | Facility: CLINIC | Age: 39
End: 2019-07-02

## 2019-07-02 RX ORDER — TERCONAZOLE 4 MG/G
1 CREAM VAGINAL NIGHTLY
Qty: 45 G | Refills: 0 | Status: SHIPPED | OUTPATIENT
Start: 2019-07-02 | End: 2020-07-06

## 2019-07-02 NOTE — TELEPHONE ENCOUNTER
Spoke to pt, pt c/o vaginal discharge, yeast-like symptoms. Was given DIFLUCAN on 06/25. Please advise.

## 2019-07-08 ENCOUNTER — TELEPHONE (OUTPATIENT)
Dept: OBSTETRICS AND GYNECOLOGY | Facility: CLINIC | Age: 39
End: 2019-07-08

## 2019-07-08 NOTE — TELEPHONE ENCOUNTER
----- Message from Paulina Dyson sent at 7/8/2019  3:19 PM CDT -----  Contact: Pt  Type:  Patient Returning Call    Who Called:PT  Who Left Message for Patient:Deysi  Does the patient know what this is regarding?:yes  Would the patient rather a call back or a response via [facility]? Call back   Best Call Back Number: [phone] (home)   Additional Information: n/a

## 2019-07-29 ENCOUNTER — TELEPHONE (OUTPATIENT)
Dept: OBSTETRICS AND GYNECOLOGY | Facility: CLINIC | Age: 39
End: 2019-07-29

## 2019-07-29 ENCOUNTER — OFFICE VISIT (OUTPATIENT)
Dept: OBSTETRICS AND GYNECOLOGY | Facility: CLINIC | Age: 39
End: 2019-07-29
Payer: MEDICAID

## 2019-07-29 VITALS
HEIGHT: 62 IN | SYSTOLIC BLOOD PRESSURE: 110 MMHG | DIASTOLIC BLOOD PRESSURE: 70 MMHG | BODY MASS INDEX: 32.25 KG/M2 | WEIGHT: 175.25 LBS

## 2019-07-29 DIAGNOSIS — N89.8 VAGINAL DISCHARGE: Primary | ICD-10-CM

## 2019-07-29 PROBLEM — J30.2 SEASONAL ALLERGIES: Status: RESOLVED | Noted: 2019-05-03 | Resolved: 2019-07-29

## 2019-07-29 PROCEDURE — 99213 PR OFFICE/OUTPT VISIT, EST, LEVL III, 20-29 MIN: ICD-10-PCS | Mod: S$PBB,,, | Performed by: NURSE PRACTITIONER

## 2019-07-29 PROCEDURE — 99999 PR PBB SHADOW E&M-EST. PATIENT-LVL III: CPT | Mod: PBBFAC,,, | Performed by: NURSE PRACTITIONER

## 2019-07-29 PROCEDURE — 87481 CANDIDA DNA AMP PROBE: CPT | Mod: 59

## 2019-07-29 PROCEDURE — 99999 PR PBB SHADOW E&M-EST. PATIENT-LVL III: ICD-10-PCS | Mod: PBBFAC,,, | Performed by: NURSE PRACTITIONER

## 2019-07-29 PROCEDURE — 99213 OFFICE O/P EST LOW 20 MIN: CPT | Mod: PBBFAC | Performed by: NURSE PRACTITIONER

## 2019-07-29 PROCEDURE — 87491 CHLMYD TRACH DNA AMP PROBE: CPT

## 2019-07-29 PROCEDURE — 99213 OFFICE O/P EST LOW 20 MIN: CPT | Mod: S$PBB,,, | Performed by: NURSE PRACTITIONER

## 2019-07-29 PROCEDURE — 87801 DETECT AGNT MULT DNA AMPLI: CPT

## 2019-07-29 RX ORDER — FLUCONAZOLE 150 MG/1
150 TABLET ORAL DAILY
Qty: 2 TABLET | Refills: 0 | Status: SHIPPED | OUTPATIENT
Start: 2019-07-29 | End: 2019-07-31

## 2019-07-29 RX ORDER — CLINDAMYCIN HYDROCHLORIDE 300 MG/1
300 CAPSULE ORAL EVERY 8 HOURS
Qty: 21 CAPSULE | Refills: 0 | Status: SHIPPED | OUTPATIENT
Start: 2019-07-29 | End: 2019-08-05

## 2019-07-29 NOTE — TELEPHONE ENCOUNTER
Spoke to patient and scheduled her appointment for 07/29/19 at 10:45am to see ROMELIA Sanchez at the [place] location. Patient verbalized understanding.

## 2019-07-29 NOTE — TELEPHONE ENCOUNTER
----- Message from Laura Penn NP sent at 7/29/2019 11:17 AM CDT -----  Patient wants birth contort for ance. S/P hysterectomy. You have seen her for this issue and she wanted you to contact regarding this.

## 2019-07-29 NOTE — TELEPHONE ENCOUNTER
Spoke to the pt. And advised her that Dr. Long stated that we do not send rx for patient's without a uterus     Can use otc products or refer to derm. Pt. Acknowledged understanding. patrick Sanchez

## 2019-07-29 NOTE — TELEPHONE ENCOUNTER
----- Message from Tanika Martinez sent at 7/29/2019  8:45 AM CDT -----  Contact: self [phone]  States that she is calling to get an appt today with Dr Long or another provider for discharge and odor. Please call back at [phone]//thank you acc

## 2019-07-29 NOTE — PROGRESS NOTES
CC: Possible BV    Fco Watts is a 38 y.o. female  presents for possible BV. Patient is sexually active and c/o vaginal odor.     Past Medical History:   Diagnosis Date    Abnormal Pap smear of cervix     ADD (attention deficit disorder) without hyperactivity 2019    Prescribed Adderall 10 1 po qd by her previous PCP, Dr. Dorita Cota, which she takes prn inattention while studying    Attention deficit hyperactivity disorder (ADHD), predominantly inattentive type 5/3/2019    Dyslipidemia     low HDL    Thyroid disease     h/o hypothyroidism    Vitamin D deficiency      Past Surgical History:   Procedure Laterality Date     SECTION  2010    DILATION AND CURETTAGE OF UTERUS      stillbirth    HYSTERECTOMY      Still has ovaries (hx abnl pap); mayda     Social History     Socioeconomic History    Marital status: Single     Spouse name: Not on file    Number of children: Not on file    Years of education: Not on file    Highest education level: Not on file   Occupational History    Not on file   Social Needs    Financial resource strain: Not on file    Food insecurity:     Worry: Not on file     Inability: Not on file    Transportation needs:     Medical: Not on file     Non-medical: Not on file   Tobacco Use    Smoking status: Never Smoker    Smokeless tobacco: Never Used   Substance and Sexual Activity    Alcohol use: Not Currently     Alcohol/week: 0.0 - 0.6 oz    Drug use: No    Sexual activity: Yes     Partners: Male     Birth control/protection: None     Comment:    Lifestyle    Physical activity:     Days per week: Not on file     Minutes per session: Not on file    Stress: Not on file   Relationships    Social connections:     Talks on phone: Not on file     Gets together: Not on file     Attends Denominational service: Not on file     Active member of club or organization: Not on file     Attends meetings of clubs or organizations: Not on  "file     Relationship status: Not on file   Other Topics Concern    Not on file   Social History Narrative    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); kids are 23 years, who is an RN        Breakfast: Skips (previously 5%, previously 25%: cereal or McDonalds: sausage biscuit or steak McMuffin (previously meal replacement shake); unsweetened lemon water (previously Cokes)    Lunch: 75% Hamberger or pizza or fried chicken (previously grilled chicken or salad); 75% burger & chip (previous 25%); water    Dinner: Home cooked: baked chicken or hamberger steak & gravy (occasionally 1 slice pizza or fried chicken); cranberry juice or soda or water    Snacks: Candy bar 2-3x/wk (previously ice cream 1x/d or chips 2x/wk or previoulsy Star crunch pie)     Eats out: 3x/m (previously 5x/wk)    Water: 36 oz/d (previously 16 oz/d, previously 48-64)    PA: None (previously 3x/wk; each 1 hour)    Goal weight is 150 lbs     Family History   Problem Relation Age of Onset    Colon cancer Father     Cancer Father     Hypertension Mother     Breast cancer Neg Hx     Ovarian cancer Neg Hx      OB History        7    Para   6    Term   5       1    AB   1    Living   6       SAB   1    TAB        Ectopic        Multiple        Live Births   6                 /70 (BP Location: Left arm, Patient Position: Sitting, BP Method: Medium (Manual))   Ht 5' 2" (1.575 m)   Wt 79.5 kg (175 lb 4.3 oz)   LMP  (LMP Unknown)   BMI 32.06 kg/m²       ROS:  GENERAL: Denies weight gain or weight loss. Feeling well overall.   REPRODUCTIVE: See HPI.       PHYSICAL EXAM:  APPEARANCE: Well nourished, well developed, in no acute distress.  PELVIC: Normal external genitalia without lesions.  Vagina scant discharge.    1. Vaginal discharge  Vaginosis Screen by DNA Probe    C. trachomatis/N. gonorrhoeae by AMP DNA     PLAN:  Wet prep; negative  GC and Affirm cx            "

## 2019-07-30 ENCOUNTER — TELEPHONE (OUTPATIENT)
Dept: OBSTETRICS AND GYNECOLOGY | Facility: CLINIC | Age: 39
End: 2019-07-30

## 2019-07-30 LAB
BACTERIAL VAGINOSIS DNA: NEGATIVE
C TRACH DNA SPEC QL NAA+PROBE: NOT DETECTED
CANDIDA GLABRATA DNA: NEGATIVE
CANDIDA KRUSEI DNA: NEGATIVE
CANDIDA RRNA VAG QL PROBE: POSITIVE
N GONORRHOEA DNA SPEC QL NAA+PROBE: NOT DETECTED
T VAGINALIS RRNA GENITAL QL PROBE: NEGATIVE

## 2019-07-30 NOTE — TELEPHONE ENCOUNTER
----- Message from Laura Penn NP sent at 7/30/2019  8:10 AM CDT -----  Please call patient and inform her that cx indicated yeast, please complete the Diflucan.

## 2019-07-30 NOTE — TELEPHONE ENCOUNTER
Spoke to patient and let her know that her culture indicated yeast and to please complete the diflucan. Patient verbalized understanding.

## 2019-08-27 DIAGNOSIS — F90.0 ATTENTION DEFICIT HYPERACTIVITY DISORDER (ADHD), PREDOMINANTLY INATTENTIVE TYPE: ICD-10-CM

## 2019-08-27 RX ORDER — DEXTROAMPHETAMINE SACCHARATE, AMPHETAMINE ASPARTATE MONOHYDRATE, DEXTROAMPHETAMINE SULFATE AND AMPHETAMINE SULFATE 2.5; 2.5; 2.5; 2.5 MG/1; MG/1; MG/1; MG/1
10 CAPSULE, EXTENDED RELEASE ORAL EVERY MORNING
Qty: 28 CAPSULE | Refills: 0 | OUTPATIENT
Start: 2019-08-27 | End: 2019-09-24

## 2019-08-28 ENCOUNTER — TELEPHONE (OUTPATIENT)
Dept: INTERNAL MEDICINE | Facility: CLINIC | Age: 39
End: 2019-08-28

## 2019-08-28 NOTE — TELEPHONE ENCOUNTER
----- Message from Tony Colunga sent at 8/28/2019 11:18 AM CDT -----  Contact: self  Type:  Patient Returning Call    Who Called:pt  Who Left Message for Patient:n/a  Does the patient know what this is regarding?:no  Would the patient rather a call back or a response via [facility]? Call back  Best Call Back Number:[phone]  Additional Information: none    Thanks,  Tony Colunga

## 2019-08-28 NOTE — TELEPHONE ENCOUNTER
----- Message from Paulina Dyson sent at 8/28/2019 11:47 AM CDT -----  Contact: PT   Type:  Patient Returning Call    Who Called: Pt   Who Left Message for Patient:unknown  Does the patient know what this is regarding?: Yes  Would the patient rather a call back or a response via [facility]? Call back   Best Call Back Number: [phone] (home)   Additional Information: n/a

## 2019-08-28 NOTE — TELEPHONE ENCOUNTER
----- Message from Jackie Ritter sent at 8/28/2019  4:27 PM CDT -----  Patient returning call.[phone] (tojm)

## 2019-08-30 ENCOUNTER — OFFICE VISIT (OUTPATIENT)
Dept: INTERNAL MEDICINE | Facility: CLINIC | Age: 39
End: 2019-08-30
Payer: MEDICAID

## 2019-08-30 VITALS
OXYGEN SATURATION: 99 % | DIASTOLIC BLOOD PRESSURE: 64 MMHG | TEMPERATURE: 98 F | WEIGHT: 172.38 LBS | SYSTOLIC BLOOD PRESSURE: 112 MMHG | BODY MASS INDEX: 31.72 KG/M2 | HEIGHT: 62 IN | HEART RATE: 62 BPM

## 2019-08-30 DIAGNOSIS — E55.9 VITAMIN D DEFICIENCY: ICD-10-CM

## 2019-08-30 DIAGNOSIS — L70.9 ACNE, UNSPECIFIED ACNE TYPE: ICD-10-CM

## 2019-08-30 DIAGNOSIS — F90.0 ATTENTION DEFICIT HYPERACTIVITY DISORDER (ADHD), PREDOMINANTLY INATTENTIVE TYPE: Primary | ICD-10-CM

## 2019-08-30 DIAGNOSIS — E66.9 OBESITY (BMI 30.0-34.9): ICD-10-CM

## 2019-08-30 PROCEDURE — 99999 PR PBB SHADOW E&M-EST. PATIENT-LVL III: CPT | Mod: PBBFAC,,, | Performed by: INTERNAL MEDICINE

## 2019-08-30 PROCEDURE — 99214 OFFICE O/P EST MOD 30 MIN: CPT | Mod: S$PBB,,, | Performed by: INTERNAL MEDICINE

## 2019-08-30 PROCEDURE — 99999 PR PBB SHADOW E&M-EST. PATIENT-LVL III: ICD-10-PCS | Mod: PBBFAC,,, | Performed by: INTERNAL MEDICINE

## 2019-08-30 PROCEDURE — 99214 PR OFFICE/OUTPT VISIT, EST, LEVL IV, 30-39 MIN: ICD-10-PCS | Mod: S$PBB,,, | Performed by: INTERNAL MEDICINE

## 2019-08-30 PROCEDURE — 99213 OFFICE O/P EST LOW 20 MIN: CPT | Mod: PBBFAC,PN | Performed by: INTERNAL MEDICINE

## 2019-08-30 NOTE — PROGRESS NOTES
Subjective:      Patient ID: Fco Watts is a 38 y.o. female.    Chief Complaint: ADHD      HPI     Ms. Fco Watts is a patient of Rajeev Rodriguez MD, who presents for ADHD    VS, labs & imaging reviewed and discussed with patient, including 3 lb wt loss from 19-19! Vit D 25 ng/mL on 10/12/18.    She reports an improvement in her acne since starting OCPs and restarting spironolactone 100 mg 1 po qd, which were recommended by her dermatologist.    She reports her Adderall 10 mg 1 po qd on usually 3 consecutive days at a time prior to examinations is not having the increase focus that has been the case in the past.      Past Medical History:   Diagnosis Date    Abnormal Pap smear of cervix     ADD (attention deficit disorder) without hyperactivity 2019    Prescribed Adderall 10 1 po qd by her previous PCP, Dr. Dorita Cota, which she takes prn inattention while studying    Attention deficit hyperactivity disorder (ADHD), predominantly inattentive type 5/3/2019    Dyslipidemia     low HDL    Thyroid disease     h/o hypothyroidism    Vitamin D deficiency      Past Surgical History:   Procedure Laterality Date     SECTION  2010    DILATION AND CURETTAGE OF UTERUS      stillbirth    HYSTERECTOMY      Still has ovaries (hx abnl pap); mayda     Social History     Socioeconomic History    Marital status: Single     Spouse name: Not on file    Number of children: Not on file    Years of education: Not on file    Highest education level: Not on file   Occupational History    Not on file   Social Needs    Financial resource strain: Not on file    Food insecurity:     Worry: Not on file     Inability: Not on file    Transportation needs:     Medical: Not on file     Non-medical: Not on file   Tobacco Use    Smoking status: Never Smoker    Smokeless tobacco: Never Used   Substance and Sexual Activity    Alcohol use: Not Currently     Alcohol/week: 0.0 -  0.6 oz    Drug use: No    Sexual activity: Yes     Partners: Male     Birth control/protection: None     Comment:    Lifestyle    Physical activity:     Days per week: Not on file     Minutes per session: Not on file    Stress: Not on file   Relationships    Social connections:     Talks on phone: Not on file     Gets together: Not on file     Attends [employer] service: Not on file     Active member of club or organization: Not on file     Attends meetings of clubs or organizations: Not on file     Relationship status: Not on file   Other Topics Concern    Not on file   Social History Narrative    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); kids are 23 years, who is an RN        Breakfast: Skips (previously 5%, previously 25%: cereal or McDonalds: sausage biscuit or steak McMuffin (previously meal replacement shake); unsweetened lemon water (previously Cokes)    Lunch: 75% Hamberger or pizza or fried chicken (previously grilled chicken or salad); 75% burger & chip (previous 25%); water    Dinner: Home cooked: baked chicken or hamberger steak & gravy (occasionally 1 slice pizza or fried chicken); cranberry juice or soda or water    Snacks: Candy bar 2-3x/wk (previously ice cream 1x/d or chips 2x/wk or previoulsy Star crunch pie)     Eats out: 3x/m (previously 5x/wk)    Water: 36 oz/d (previously 16 oz/d, previously 48-64)    PA: None (previously 3x/wk; each 1 hour)    Goal weight is 150 lbs     Family History   Problem Relation Age of Onset    Colon cancer Father     Cancer Father     Hypertension Mother     Breast cancer Neg Hx     Ovarian cancer Neg Hx        Current Outpatient Medications:     dextroamphetamine-amphetamine (ADDERALL XR) 10 MG 24 hr capsule, Take 1 capsule (10 mg total) by mouth every morning. (Patient taking differently: Take 10 mg by mouth as needed. ), Disp: 28 capsule, Rfl: 0    spironolactone (ALDACTONE) 100 MG tablet, Take 100 mg by  "mouth once daily., Disp: , Rfl: 2    terconazole (TERAZOL 7) 0.4 % Crea, Place 1 applicator vaginally every evening., Disp: 45 g, Rfl: 0    Review of patient's allergies indicates:  No Known Allergies     Review of Systems   All remaining systems negative    Objective:     /64 (BP Location: Left arm, Patient Position: Sitting, BP Method: Medium (Manual))   Pulse 62   Temp 97.9 °F (36.6 °C) (Other (see comments))   Ht 5' 2" (1.575 m)   Wt 78.2 kg (172 lb 6.4 oz)   LMP  (LMP Unknown)   SpO2 99%   BMI 31.53 kg/m²     Physical Exam  GEN: A&O fully, NAD  PSYC: Normal affect  DERM: Face with two 3 x 3 x 2 mm papules on right and two 2 x 2 x 1 mm papules on left, each slightly hyperpigmented      Lab Results   Component Value Date    WBC 5.74 10/12/2018    HGB 13.1 10/12/2018    HCT 40.4 10/12/2018     10/12/2018    CHOL 166 10/12/2018    TRIG 70 10/12/2018    HDL 46 10/12/2018    LDLCALC 106.0 10/12/2018    ALT 18 10/12/2018    AST 18 10/12/2018     10/12/2018    K 4.2 10/12/2018     10/12/2018    CREATININE 0.9 10/12/2018    BUN 7 10/12/2018    CO2 20 (L) 10/12/2018    CALCIUM 9.6 10/12/2018    HGBA1C 5.4 10/12/2018       Assessment:      1. Attention deficit hyperactivity disorder (ADHD), predominantly inattentive type: No s/s abuse/overuse or other rx or illicit drug use or alcohol abuse. No cv s/s. Risks and benefits discussed and patient chose to move forward with continuing her current dose of Adderall 10 mg qd while stopping spironolactone for 1 month and continuing apple cidar vinegar and OCPs for now to observe potential decrement of a common AE of confusion listed for spironolactone and to monitor acne.      2. Acne, unspecified acne type: Improved.     3. Obesity (BMI 30.0-34.9): Congratulated her on her 3 lb wt loss over the past 1 month! I encouraged the following permanent lifestyle modifications, particularly gradually and systematically increasing physical activity (5-90 " min/episode, 3-5 times/week), water (1/2 of body weight in ounces) and avoiding potatoes, sugar sweetened beverages, red/processed meats (including chicken), fast food, grains (rice/bread/pasta); and increasing yogurt, whole fruits, unsalted nuts to 3-5 servings/day, and daily weight logging; non-starchy vegetables, cooked beans, and un-fried seafood have weak effects on weight.    4.      Vitamin D deficiency: Mildly low. Encouraged supplementing 1000 IU po qd.    Plan:   Attention deficit hyperactivity disorder (ADHD), predominantly inattentive type    Acne, unspecified acne type    Obesity (BMI 30.0-34.9)    Vitamin D deficiency      Follow up in about 4 weeks (around 9/27/2019), or if symptoms worsen or fail to improve, for FU on ADD & acne.    I spent >25 minutes of time with patient 50% or more of which was discussing labs and plans of care.

## 2019-09-27 ENCOUNTER — LAB VISIT (OUTPATIENT)
Dept: LAB | Facility: HOSPITAL | Age: 39
End: 2019-09-27
Attending: INTERNAL MEDICINE
Payer: MEDICAID

## 2019-09-27 ENCOUNTER — OFFICE VISIT (OUTPATIENT)
Dept: INTERNAL MEDICINE | Facility: CLINIC | Age: 39
End: 2019-09-27
Payer: MEDICAID

## 2019-09-27 VITALS
OXYGEN SATURATION: 98 % | HEART RATE: 69 BPM | WEIGHT: 169.75 LBS | DIASTOLIC BLOOD PRESSURE: 64 MMHG | HEIGHT: 62 IN | TEMPERATURE: 99 F | SYSTOLIC BLOOD PRESSURE: 120 MMHG | BODY MASS INDEX: 31.24 KG/M2

## 2019-09-27 DIAGNOSIS — E55.9 VITAMIN D DEFICIENCY: ICD-10-CM

## 2019-09-27 DIAGNOSIS — E66.9 OBESITY (BMI 30.0-34.9): ICD-10-CM

## 2019-09-27 DIAGNOSIS — K58.2 IRRITABLE BOWEL SYNDROME WITH BOTH CONSTIPATION AND DIARRHEA: ICD-10-CM

## 2019-09-27 DIAGNOSIS — Z71.89 COUNSELING ON HEALTH PROMOTION AND DISEASE PREVENTION: ICD-10-CM

## 2019-09-27 DIAGNOSIS — E78.5 DYSLIPIDEMIA: ICD-10-CM

## 2019-09-27 DIAGNOSIS — F90.0 ATTENTION DEFICIT HYPERACTIVITY DISORDER (ADHD), PREDOMINANTLY INATTENTIVE TYPE: Primary | ICD-10-CM

## 2019-09-27 LAB
25(OH)D3+25(OH)D2 SERPL-MCNC: 33 NG/ML (ref 30–96)
ALBUMIN SERPL BCP-MCNC: 3.7 G/DL (ref 3.5–5.2)
ALP SERPL-CCNC: 46 U/L (ref 55–135)
ALT SERPL W/O P-5'-P-CCNC: 11 U/L (ref 10–44)
ANION GAP SERPL CALC-SCNC: 8 MMOL/L (ref 8–16)
AST SERPL-CCNC: 14 U/L (ref 10–40)
BASOPHILS # BLD AUTO: 0.03 K/UL (ref 0–0.2)
BASOPHILS NFR BLD: 0.6 % (ref 0–1.9)
BILIRUB SERPL-MCNC: 0.5 MG/DL (ref 0.1–1)
BUN SERPL-MCNC: 6 MG/DL (ref 6–20)
CALCIUM SERPL-MCNC: 9.8 MG/DL (ref 8.7–10.5)
CHLORIDE SERPL-SCNC: 106 MMOL/L (ref 95–110)
CHOLEST SERPL-MCNC: 160 MG/DL (ref 120–199)
CHOLEST/HDLC SERPL: 3 {RATIO} (ref 2–5)
CO2 SERPL-SCNC: 23 MMOL/L (ref 23–29)
CREAT SERPL-MCNC: 0.9 MG/DL (ref 0.5–1.4)
DIFFERENTIAL METHOD: NORMAL
EOSINOPHIL # BLD AUTO: 0.1 K/UL (ref 0–0.5)
EOSINOPHIL NFR BLD: 1.5 % (ref 0–8)
ERYTHROCYTE [DISTWIDTH] IN BLOOD BY AUTOMATED COUNT: 12.7 % (ref 11.5–14.5)
EST. GFR  (AFRICAN AMERICAN): >60 ML/MIN/1.73 M^2
EST. GFR  (NON AFRICAN AMERICAN): >60 ML/MIN/1.73 M^2
GLUCOSE SERPL-MCNC: 88 MG/DL (ref 70–110)
HCT VFR BLD AUTO: 37.2 % (ref 37–48.5)
HDLC SERPL-MCNC: 54 MG/DL (ref 40–75)
HDLC SERPL: 33.8 % (ref 20–50)
HGB BLD-MCNC: 12.3 G/DL (ref 12–16)
IMM GRANULOCYTES # BLD AUTO: 0.01 K/UL (ref 0–0.04)
IMM GRANULOCYTES NFR BLD AUTO: 0.2 % (ref 0–0.5)
LDLC SERPL CALC-MCNC: 93.2 MG/DL (ref 63–159)
LYMPHOCYTES # BLD AUTO: 1.9 K/UL (ref 1–4.8)
LYMPHOCYTES NFR BLD: 35.7 % (ref 18–48)
MCH RBC QN AUTO: 29.9 PG (ref 27–31)
MCHC RBC AUTO-ENTMCNC: 33.1 G/DL (ref 32–36)
MCV RBC AUTO: 91 FL (ref 82–98)
MONOCYTES # BLD AUTO: 0.3 K/UL (ref 0.3–1)
MONOCYTES NFR BLD: 6.4 % (ref 4–15)
NEUTROPHILS # BLD AUTO: 2.9 K/UL (ref 1.8–7.7)
NEUTROPHILS NFR BLD: 55.6 % (ref 38–73)
NONHDLC SERPL-MCNC: 106 MG/DL
NRBC BLD-RTO: 0 /100 WBC
PLATELET # BLD AUTO: 237 K/UL (ref 150–350)
PMV BLD AUTO: 10.8 FL (ref 9.2–12.9)
POTASSIUM SERPL-SCNC: 4.3 MMOL/L (ref 3.5–5.1)
PROT SERPL-MCNC: 7 G/DL (ref 6–8.4)
RBC # BLD AUTO: 4.11 M/UL (ref 4–5.4)
SODIUM SERPL-SCNC: 137 MMOL/L (ref 136–145)
TRIGL SERPL-MCNC: 64 MG/DL (ref 30–150)
WBC # BLD AUTO: 5.18 K/UL (ref 3.9–12.7)

## 2019-09-27 PROCEDURE — 86703 HIV-1/HIV-2 1 RESULT ANTBDY: CPT

## 2019-09-27 PROCEDURE — 80053 COMPREHEN METABOLIC PANEL: CPT

## 2019-09-27 PROCEDURE — 99999 PR PBB SHADOW E&M-EST. PATIENT-LVL III: ICD-10-PCS | Mod: PBBFAC,,, | Performed by: INTERNAL MEDICINE

## 2019-09-27 PROCEDURE — 85025 COMPLETE CBC W/AUTO DIFF WBC: CPT

## 2019-09-27 PROCEDURE — 80061 LIPID PANEL: CPT

## 2019-09-27 PROCEDURE — 90471 IMMUNIZATION ADMIN: CPT | Mod: PBBFAC,PN

## 2019-09-27 PROCEDURE — 36415 COLL VENOUS BLD VENIPUNCTURE: CPT | Mod: PO

## 2019-09-27 PROCEDURE — 99214 OFFICE O/P EST MOD 30 MIN: CPT | Mod: S$PBB,25,, | Performed by: INTERNAL MEDICINE

## 2019-09-27 PROCEDURE — 99999 PR PBB SHADOW E&M-EST. PATIENT-LVL III: CPT | Mod: PBBFAC,,, | Performed by: INTERNAL MEDICINE

## 2019-09-27 PROCEDURE — 99214 PR OFFICE/OUTPT VISIT, EST, LEVL IV, 30-39 MIN: ICD-10-PCS | Mod: S$PBB,25,, | Performed by: INTERNAL MEDICINE

## 2019-09-27 PROCEDURE — 99213 OFFICE O/P EST LOW 20 MIN: CPT | Mod: PBBFAC,PN,25 | Performed by: INTERNAL MEDICINE

## 2019-09-27 PROCEDURE — 82306 VITAMIN D 25 HYDROXY: CPT

## 2019-09-27 RX ORDER — DICYCLOMINE HYDROCHLORIDE 20 MG/1
20 TABLET ORAL EVERY 6 HOURS
Qty: 120 TABLET | Refills: 0 | Status: SHIPPED | OUTPATIENT
Start: 2019-09-27 | End: 2019-10-27

## 2019-09-27 RX ORDER — DEXTROAMPHETAMINE SACCHARATE, AMPHETAMINE ASPARTATE MONOHYDRATE, DEXTROAMPHETAMINE SULFATE AND AMPHETAMINE SULFATE 5; 5; 5; 5 MG/1; MG/1; MG/1; MG/1
20 CAPSULE, EXTENDED RELEASE ORAL EVERY MORNING
Qty: 14 CAPSULE | Refills: 0 | Status: SHIPPED | OUTPATIENT
Start: 2019-09-27 | End: 2020-02-05

## 2019-09-27 NOTE — PROGRESS NOTES
Subjective:      Patient ID: Fco Watts is a 38 y.o. female.    Chief Complaint: 4 week follow up      HPI     Ms. Fco Watts is a patient of Rajeev Rodriguez MD, who presents for 4 week follow up    She reports needing to double her Adderall XR 10 mg to 20 mg on days she goes to class. She doesn't take it on weekends or on breaks, which has allowed her to not run out. She has a few days left.     VS, labs & imaging reviewed and discussed with patient.    Of note, EPIC indicates due preventive measures, including FLU, which she is amenable to getting today.    She endorses an exacerbation of her IBS sx, including alternating diarrhea and constipation, for which she was previously on Bentyl, which helped.       Past Medical History:   Diagnosis Date    Abnormal Pap smear of cervix     ADD (attention deficit disorder) without hyperactivity 2019    Prescribed Adderall 10 1 po qd by her previous PCP, Dr. Dorita Cota, which she takes prn inattention while studying    Attention deficit hyperactivity disorder (ADHD), predominantly inattentive type 5/3/2019    Dyslipidemia     low HDL    Thyroid disease     h/o hypothyroidism    Vitamin D deficiency      Past Surgical History:   Procedure Laterality Date     SECTION  2010    DILATION AND CURETTAGE OF UTERUS      stillbirth    HYSTERECTOMY      Still has ovaries (hx abnl pap); mayda     Social History     Socioeconomic History    Marital status: Single     Spouse name: Not on file    Number of children: Not on file    Years of education: Not on file    Highest education level: Not on file   Occupational History    Not on file   Social Needs    Financial resource strain: Not on file    Food insecurity:     Worry: Not on file     Inability: Not on file    Transportation needs:     Medical: Not on file     Non-medical: Not on file   Tobacco Use    Smoking status: Never Smoker    Smokeless tobacco: Never Used    Substance and Sexual Activity    Alcohol use: Not Currently     Alcohol/week: 0.0 - 1.0 standard drinks    Drug use: No    Sexual activity: Yes     Partners: Male     Birth control/protection: None     Comment:    Lifestyle    Physical activity:     Days per week: Not on file     Minutes per session: Not on file    Stress: Not on file   Relationships    Social connections:     Talks on phone: Not on file     Gets together: Not on file     Attends [employer] service: Not on file     Active member of club or organization: Not on file     Attends meetings of clubs or organizations: Not on file     Relationship status: Not on file   Other Topics Concern    Not on file   Social History Narrative    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); kids are 23 years, who is an RN        Breakfast: Skips (previously 5%, previously 25%: cereal or McDonalds: sausage biscuit or steak McMuffin (previously meal replacement shake); unsweetened lemon water (previously Cokes)    Lunch: 75% Hamberger or pizza or fried chicken (previously grilled chicken or salad); 75% burger & chip (previous 25%); water    Dinner: Home cooked: baked chicken or hamberger steak & gravy (occasionally 1 slice pizza or fried chicken); cranberry juice or soda or water    Snacks: Candy bar 2-3x/wk (previously ice cream 1x/d or chips 2x/wk or previoulsy Star crunch pie)     Eats out: 3x/m (previously 5x/wk)    Water: 36 oz/d (previously 16 oz/d, previously 48-64)    PA: None (previously 3x/wk; each 1 hour)    Goal weight is 150 lbs     Family History   Problem Relation Age of Onset    Colon cancer Father     Cancer Father     Hypertension Mother     Breast cancer Neg Hx     Ovarian cancer Neg Hx        Current Outpatient Medications:     dextroamphetamine-amphetamine (ADDERALL XR) 20 MG 24 hr capsule, Take 1 capsule (20 mg total) by mouth every morning. for 14 days, Disp: 14 capsule, Rfl: 0     "dicyclomine (BENTYL) 20 mg tablet, Take 1 tablet (20 mg total) by mouth every 6 (six) hours., Disp: 120 tablet, Rfl: 0    spironolactone (ALDACTONE) 100 MG tablet, Take 100 mg by mouth once daily., Disp: , Rfl: 2    terconazole (TERAZOL 7) 0.4 % Crea, Place 1 applicator vaginally every evening., Disp: 45 g, Rfl: 0    Review of patient's allergies indicates:  No Known Allergies     Review of Systems   All remaining systems negative    Objective:     /64 (BP Location: Left arm, Patient Position: Sitting, BP Method: Medium (Automatic))   Pulse 69   Temp 98.6 °F (37 °C) (Other (see comments))   Ht 5' 2" (1.575 m)   Wt 77 kg (169 lb 12.1 oz)   LMP  (LMP Unknown)   SpO2 98%   BMI 31.05 kg/m²     Physical Exam  GEN: A&O fully, NAD  PSYC: Normal affect  CV: RRR, no M/G/R  PULM: CTA bilaterally, no wheezes, rales, crackles   GI: S/NT/ND, normal bowel sounds      Lab Results   Component Value Date    WBC 5.74 10/12/2018    HGB 13.1 10/12/2018    HCT 40.4 10/12/2018     10/12/2018    CHOL 166 10/12/2018    TRIG 70 10/12/2018    HDL 46 10/12/2018    LDLCALC 106.0 10/12/2018    ALT 18 10/12/2018    AST 18 10/12/2018     10/12/2018    K 4.2 10/12/2018     10/12/2018    CREATININE 0.9 10/12/2018    BUN 7 10/12/2018    CO2 20 (L) 10/12/2018    CALCIUM 9.6 10/12/2018    HGBA1C 5.4 10/12/2018       Assessment:      1. Attention deficit hyperactivity disorder (ADHD), predominantly inattentive type: No s/s abuse/overuse or other rx or illicit drug use or alcohol abuse. No cv s/s. Risks and benefits discussed and patient chose to move forward with renewal of her Adderall 20 mg qd.    2. Counseling on health promotion and disease prevention: Flu shot today.     3. Obesity (BMI 30.0-34.9): Congratulated her on her 3 lb wt loss over the past 1 month! I encouraged the following permanent lifestyle modifications, particularly gradually and systematically increasing physical activity (5-90 min/episode, 3-5 " times/week), water (1/2 of body weight in ounces) and avoiding potatoes, sugar sweetened beverages, red/processed meats (including chicken), fast food, grains (rice/bread/pasta); and increasing yogurt, whole fruits, unsalted nuts to 3-5 servings/day, and daily weight logging; non-starchy vegetables, cooked beans, and un-fried seafood have weak effects on weight.   4. Irritable bowel syndrome with both constipation and diarrhea: Exacerbated. Risks and benefits discussed and patient chose to move forward with restarting her Bentyl 20 mg qid.   5. Dyslipidemia: Low HDL. Encouraged:  1. Increase dietary avacado, olive oil and unsalted nuts.  2. Increase your favorite physical activity by 5-30 minutes per day.   3. If you are around second-hand smoke, avoid it.   6. Vitamin D deficiency: Will recheck today.         Plan:   Attention deficit hyperactivity disorder (ADHD), predominantly inattentive type  -     dextroamphetamine-amphetamine (ADDERALL XR) 20 MG 24 hr capsule; Take 1 capsule (20 mg total) by mouth every morning. for 14 days  Dispense: 14 capsule; Refill: 0    Counseling on health promotion and disease prevention  -     Influenza - Quadrivalent (3 years & older) w/ Preservative  -     HIV 1/2 Ag/Ab (4th Gen); Future; Expected date: 09/27/2019    Obesity (BMI 30.0-34.9)    Irritable bowel syndrome with both constipation and diarrhea  -     dicyclomine (BENTYL) 20 mg tablet; Take 1 tablet (20 mg total) by mouth every 6 (six) hours.  Dispense: 120 tablet; Refill: 0  -     CBC auto differential; Future; Expected date: 09/27/2019  -     Comprehensive metabolic panel; Future; Expected date: 09/27/2019    Dyslipidemia  -     Lipid panel; Future; Expected date: 09/27/2019    Vitamin D deficiency  -     Vitamin D; Future; Expected date: 09/27/2019        Follow up in about 3 months (around 12/27/2019), or if symptoms worsen or fail to improve, for FU on ADD.    I spent >25 minutes of time with patient 50% or more of  which was discussing labs and plans of care.

## 2019-09-30 LAB — HIV 1+2 AB+HIV1 P24 AG SERPL QL IA: NEGATIVE

## 2019-10-10 ENCOUNTER — TELEPHONE (OUTPATIENT)
Dept: INTERNAL MEDICINE | Facility: CLINIC | Age: 39
End: 2019-10-10

## 2019-10-10 NOTE — TELEPHONE ENCOUNTER
----- Message from Franki Barahona sent at 10/10/2019 11:41 AM CDT -----  Contact: pt   .Type:  Test Results    Who Called:  Pt   Name of Test (Lab/Mammo/Etc):  .labs   Date of Test:  9/27  Ordering Provider:  crow   Where the test was performed:  [facility]   Would the patient rather a call back or a response via [facility]? Callback   Best Call Back Number:  ..[phone]    Additional Information:

## 2020-01-31 ENCOUNTER — TELEPHONE (OUTPATIENT)
Dept: INTERNAL MEDICINE | Facility: CLINIC | Age: 40
End: 2020-01-31

## 2020-01-31 NOTE — TELEPHONE ENCOUNTER
----- Message from Sondra Washburn sent at 1/31/2020 10:22 AM CST -----  Contact: pt  .Type:  Same Day Appointment Request    Caller is requesting a same day appointment.  Caller declined first available appointment listed below.    Name of Caller: pt  When is the first available appointment? 3/18  Symptoms: night sweats /tireness/headaches  Best Call Back Number:[phone] (home)     Additional Information:

## 2020-02-03 ENCOUNTER — TELEPHONE (OUTPATIENT)
Dept: INTERNAL MEDICINE | Facility: CLINIC | Age: 40
End: 2020-02-03

## 2020-02-03 NOTE — TELEPHONE ENCOUNTER
----- Message from Marisol Montano sent at 2/3/2020  2:50 PM CST -----  Contact: PT  Pt is requesting call back in regards to scheduling appt for med refill.        Pls call back at [phone]

## 2020-02-05 ENCOUNTER — OFFICE VISIT (OUTPATIENT)
Dept: INTERNAL MEDICINE | Facility: CLINIC | Age: 40
End: 2020-02-05
Payer: MEDICAID

## 2020-02-05 VITALS
DIASTOLIC BLOOD PRESSURE: 72 MMHG | SYSTOLIC BLOOD PRESSURE: 112 MMHG | WEIGHT: 169.63 LBS | BODY MASS INDEX: 31.21 KG/M2 | OXYGEN SATURATION: 100 % | HEART RATE: 74 BPM | HEIGHT: 62 IN | TEMPERATURE: 98 F

## 2020-02-05 DIAGNOSIS — E78.5 DYSLIPIDEMIA: ICD-10-CM

## 2020-02-05 DIAGNOSIS — F90.0 ATTENTION DEFICIT HYPERACTIVITY DISORDER (ADHD), PREDOMINANTLY INATTENTIVE TYPE: Primary | ICD-10-CM

## 2020-02-05 DIAGNOSIS — E66.9 OBESITY (BMI 30.0-34.9): ICD-10-CM

## 2020-02-05 PROBLEM — L65.9 ALOPECIA: Status: RESOLVED | Noted: 2019-05-15 | Resolved: 2020-02-05

## 2020-02-05 PROCEDURE — 99213 OFFICE O/P EST LOW 20 MIN: CPT | Mod: PBBFAC,PN | Performed by: INTERNAL MEDICINE

## 2020-02-05 PROCEDURE — 99999 PR PBB SHADOW E&M-EST. PATIENT-LVL III: ICD-10-PCS | Mod: PBBFAC,,, | Performed by: INTERNAL MEDICINE

## 2020-02-05 PROCEDURE — 99999 PR PBB SHADOW E&M-EST. PATIENT-LVL III: CPT | Mod: PBBFAC,,, | Performed by: INTERNAL MEDICINE

## 2020-02-05 PROCEDURE — 99214 PR OFFICE/OUTPT VISIT, EST, LEVL IV, 30-39 MIN: ICD-10-PCS | Mod: S$PBB,,, | Performed by: INTERNAL MEDICINE

## 2020-02-05 PROCEDURE — 99214 OFFICE O/P EST MOD 30 MIN: CPT | Mod: S$PBB,,, | Performed by: INTERNAL MEDICINE

## 2020-02-05 RX ORDER — DEXTROAMPHETAMINE SACCHARATE, AMPHETAMINE ASPARTATE MONOHYDRATE, DEXTROAMPHETAMINE SULFATE AND AMPHETAMINE SULFATE 2.5; 2.5; 2.5; 2.5 MG/1; MG/1; MG/1; MG/1
10 CAPSULE, EXTENDED RELEASE ORAL EVERY MORNING
Qty: 28 CAPSULE | Refills: 0 | Status: SHIPPED | OUTPATIENT
Start: 2020-02-05 | End: 2020-07-06

## 2020-02-05 RX ORDER — DEXTROAMPHETAMINE SACCHARATE, AMPHETAMINE ASPARTATE, DEXTROAMPHETAMINE SULFATE AND AMPHETAMINE SULFATE 5; 5; 5; 5 MG/1; MG/1; MG/1; MG/1
20 TABLET ORAL DAILY
COMMUNITY
Start: 2019-01-15 | End: 2020-02-05

## 2020-02-05 RX ORDER — DAPSONE 50 MG/G
GEL TOPICAL
COMMUNITY
Start: 2019-08-14 | End: 2020-07-06

## 2020-02-05 RX ORDER — NORGESTIMATE AND ETHINYL ESTRADIOL 7DAYSX3 28
KIT ORAL
COMMUNITY
Start: 2020-01-22 | End: 2020-02-05

## 2020-02-05 RX ORDER — NORGESTIMATE AND ETHINYL ESTRADIOL 7DAYSX3 28
1 KIT ORAL
COMMUNITY
Start: 2019-11-26 | End: 2021-01-29

## 2020-02-05 RX ORDER — SPIRONOLACTONE 100 MG/1
100 TABLET, FILM COATED ORAL
COMMUNITY
Start: 2019-11-26 | End: 2020-02-05 | Stop reason: ALTCHOICE

## 2020-02-05 RX ORDER — TRETINOIN 0.25 MG/G
CREAM TOPICAL
COMMUNITY
Start: 2019-11-26 | End: 2020-07-06

## 2020-02-05 NOTE — PROGRESS NOTES
Subjective:      Patient ID: Fco Watts is a 39 y.o. female.    Chief Complaint: Medication Refill      HPI     Ms. 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     She reports resolution in sweats, insomnia, and anxiety, for which she stopped birth control pill, spironolactone for acne and apple cider vinegar 1 week ago and her sx have resolved. 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    She reports needing to double her Adderall XR 10 mg to 20 mg on days she goes to class. She doesn't take it on weekends or on breaks, which has allowed her to not run out. She has a few days left.     VS, labs & imaging reviewed and discussed with patient, including stable weight since 2019; nL HIV, vitamin D, eGFR, cholesterol, liver, glucose and blood counts on 19.      Past Medical History:   Diagnosis Date    Abnormal Pap smear of cervix     ADD (attention deficit disorder) without hyperactivity 2019    Prescribed Adderall 10 1 po qd by her previous PCP, Dr. Dorita Cota, which she takes prn inattention while studying    Attention deficit hyperactivity disorder (ADHD), predominantly inattentive type 5/3/2019    Dyslipidemia     low HDL    Thyroid disease     h/o hypothyroidism    Vitamin D deficiency      Past Surgical History:   Procedure Laterality Date     SECTION  2010    DILATION AND CURETTAGE OF UTERUS      stillbirth    HYSTERECTOMY      Still has ovaries (hx abnl pap); mayda     Social History     Socioeconomic History    Marital status: Single     Spouse name: Not on file    Number of children: Not on file    Years of education: Not on file    Highest education level: Not on file   Occupational  History    Not on file   Social Needs    Financial resource strain: Not on file    Food insecurity:     Worry: Not on file     Inability: Not on file    Transportation needs:     Medical: Not on file     Non-medical: Not on file   Tobacco Use    Smoking status: Never Smoker    Smokeless tobacco: Never Used   Substance and Sexual Activity    Alcohol use: Not Currently     Alcohol/week: 0.0 - 1.0 standard drinks    Drug use: No    Sexual activity: Yes     Partners: Male     Birth control/protection: None     Comment:    Lifestyle    Physical activity:     Days per week: Not on file     Minutes per session: Not on file    Stress: Not on file   Relationships    Social connections:     Talks on phone: Not on file     Gets together: Not on file     Attends Nondenominational service: Not on file     Active member of club or organization: Not on file     Attends meetings of clubs or organizations: Not on file     Relationship status: Not on file   Other Topics Concern    Not on file   Social History Narrative    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); 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 years, who is an RN; wt goal 150 lbs         Breakfast: Skips (previously 5%, previously 25%: cereal or McDonalds: sausage biscuit or steak McMuffin (previously meal replacement shake); unsweetened lemon water (previously Cokes)    Lunch: 75% Hamberger or pizza or fried chicken (previously grilled chicken or salad); 75% burger & chip (previous 25%); water    Dinner: Home cooked: baked chicken or hamberger steak & gravy (occasionally 1 slice pizza or fried chicken); cranberry juice or soda or water    Snacks: Candy bar 2-3x/wk (previously ice cream 1x/d or chips 2x/wk or previoulsy Star crunch pie)     Eats out: 3x/m (previously 5x/wk)    Water: 36 oz/d (previously 16 oz/d, previously 48-64)    PA: None (previously 3x/wk; each 1 hour)    Goal weight is 150 lbs     Family History   Problem Relation Age of Onset  "   Colon cancer Father     Cancer Father     Hypertension Mother     Breast cancer Neg Hx     Ovarian cancer Neg Hx        Current Outpatient Medications:     dapsone (ACZONE) 5 % topical gel, Apply topically., Disp: , Rfl:     dextroamphetamine-amphetamine (ADDERALL XR) 10 MG 24 hr capsule, Take 1 capsule (10 mg total) by mouth every morning., Disp: 28 capsule, Rfl: 0    norgestimate-ethinyl estradiol (ORTHO TRI-CYCLEN,TRI-SPRINTEC) 0.18/0.215/0.25 mg-35 mcg (28) tablet, Take 1 tablet by mouth., Disp: , Rfl:     terconazole (TERAZOL 7) 0.4 % Crea, Place 1 applicator vaginally every evening. (Patient not taking: Reported on 2/5/2020), Disp: 45 g, Rfl: 0    tretinoin (RETIN-A) 0.025 % cream, Apply a thin coat to the affected area every night., Disp: , Rfl:     Review of patient's allergies indicates:   Allergen Reactions    Metformin Other (See Comments)        Review of Systems   All remaining systems negative    Objective:     /72 (BP Location: Left arm, Patient Position: Sitting, BP Method: Medium (Manual))   Pulse 74   Temp 98.2 °F (36.8 °C) (Temporal)   Ht 5' 2" (1.575 m)   Wt 76.9 kg (169 lb 10.3 oz)   LMP  (LMP Unknown) Comment: Partial Hysterectomy   SpO2 100%   BMI 31.03 kg/m²     Physical Exam  GEN: A&O fully, NAD  PSYC: Normal affect  CV: RRR, no M/G/R  PULM: CTA bilaterally, no wheezes, rales, crackles   EXT: No C/C/E, normal DP pulses bilaterally      Lab Results   Component Value Date    WBC 5.18 09/27/2019    HGB 12.3 09/27/2019    HCT 37.2 09/27/2019     09/27/2019    CHOL 160 09/27/2019    TRIG 64 09/27/2019    HDL 54 09/27/2019    LDLCALC 93.2 09/27/2019    ALT 11 09/27/2019    AST 14 09/27/2019     09/27/2019    K 4.3 09/27/2019     09/27/2019    CREATININE 0.9 09/27/2019    BUN 6 09/27/2019    CO2 23 09/27/2019    CALCIUM 9.8 09/27/2019    HGBA1C 5.4 10/12/2018       Assessment:      1. Attention deficit hyperactivity disorder (ADHD), predominantly " inattentive type: No s/s abuse/overuse or other rx or illicit drug use or alcohol abuse. No cv s/s. Risks and benefits discussed and patient chose to move forward with renewal of her Adderall 10 mg qd.     2. Dyslipidemia: Resolved.    3. Obesity (BMI 30.0-34.9): Stable. I encouraged the following permanent lifestyle modifications, particularly gradually and systematically increasing physical activity (5-90 min/episode, 3-5 times/week), water (1/2 of body weight in ounces) and avoiding white potatoes, sugar sweetened beverages, red/processed meats (including chicken), fast food, grains (rice/bread/pasta); and increasing yogurt, whole fruits, unsalted nuts to 3-5 servings/day, and daily weight logging; non-starchy vegetables, cooked beans, and un-fried seafood have weak effects on weight.       Plan:   Attention deficit hyperactivity disorder (ADHD), predominantly inattentive type  -     dextroamphetamine-amphetamine (ADDERALL XR) 10 MG 24 hr capsule; Take 1 capsule (10 mg total) by mouth every morning.  Dispense: 28 capsule; Refill: 0    Dyslipidemia    Obesity (BMI 30.0-34.9)      Follow up in about 4 weeks (around 3/4/2020), or if symptoms worsen or fail to improve, for FU on ADD, obesity.    I spent >25 minutes of time with patient 50% or more of which was discussing labs and plans of care.

## 2020-02-10 ENCOUNTER — TELEPHONE (OUTPATIENT)
Dept: OBSTETRICS AND GYNECOLOGY | Facility: CLINIC | Age: 40
End: 2020-02-10

## 2020-02-10 NOTE — TELEPHONE ENCOUNTER
Returned call to patient.  She requested an appt with Dr. Long at the [place] location for c/o vaginal discharge and painful urination.  Offered next available, she said that the appt is too far away.  [facility].  Offered appt with PA, she accepted and asked for first appt after 3pm.  Appt scheduled 02/13/20 at 2:15 pm, she confirmed appt, provider and location.  She continue to attempt to communicate afterwards and telephone had poor connection.

## 2020-02-10 NOTE — TELEPHONE ENCOUNTER
----- Message from Hilda Ortiz sent at 2/10/2020  2:36 PM CST -----  Contact: Pt  Please give pt a call at .[phone] (home) she is calling to schedule an

## 2020-02-13 ENCOUNTER — OFFICE VISIT (OUTPATIENT)
Dept: OBSTETRICS AND GYNECOLOGY | Facility: CLINIC | Age: 40
End: 2020-02-13
Payer: MEDICAID

## 2020-02-13 VITALS
BODY MASS INDEX: 31.57 KG/M2 | WEIGHT: 172.63 LBS | DIASTOLIC BLOOD PRESSURE: 70 MMHG | SYSTOLIC BLOOD PRESSURE: 102 MMHG

## 2020-02-13 DIAGNOSIS — R30.0 DYSURIA: ICD-10-CM

## 2020-02-13 DIAGNOSIS — N89.8 VAGINAL DISCHARGE: Primary | ICD-10-CM

## 2020-02-13 PROCEDURE — 87491 CHLMYD TRACH DNA AMP PROBE: CPT | Mod: 59

## 2020-02-13 PROCEDURE — 99999 PR PBB SHADOW E&M-EST. PATIENT-LVL II: CPT | Mod: PBBFAC,,,

## 2020-02-13 PROCEDURE — 99213 OFFICE O/P EST LOW 20 MIN: CPT | Mod: S$PBB,,, | Performed by: OBSTETRICS & GYNECOLOGY

## 2020-02-13 PROCEDURE — 99213 PR OFFICE/OUTPT VISIT, EST, LEVL III, 20-29 MIN: ICD-10-PCS | Mod: S$PBB,,, | Performed by: OBSTETRICS & GYNECOLOGY

## 2020-02-13 PROCEDURE — 99212 OFFICE O/P EST SF 10 MIN: CPT | Mod: PBBFAC

## 2020-02-13 PROCEDURE — 99999 PR PBB SHADOW E&M-EST. PATIENT-LVL II: ICD-10-PCS | Mod: PBBFAC,,,

## 2020-02-13 PROCEDURE — 87086 URINE CULTURE/COLONY COUNT: CPT

## 2020-02-13 PROCEDURE — 87481 CANDIDA DNA AMP PROBE: CPT | Mod: 59

## 2020-02-13 RX ORDER — METRONIDAZOLE 500 MG/1
500 TABLET ORAL EVERY 12 HOURS
Qty: 14 TABLET | Refills: 0 | Status: SHIPPED | OUTPATIENT
Start: 2020-02-13 | End: 2020-02-20

## 2020-02-13 NOTE — PROGRESS NOTES
Subjective:       Patient ID: Fco Watts is a 39 y.o. female.    Chief Complaint:  Vaginal Discharge      History of Present Illness  HPI Patient is a 39 y.o. female  presents for vaginal discharge and dysuria for at least a week.  Patient is s/p hysterectomy. Is sexually active and notes the discharge following intercourse with spouse. Hx of STD: none. No pelvic pain, fever, has some urinary frequency.         GYN & OB History  No LMP recorded (lmp unknown). Patient has had a hysterectomy.   Date of Last Pap: 2017    OB History    Para Term  AB Living   7 6 5 1 1 6   SAB TAB Ectopic Multiple Live Births   1       6      # Outcome Date GA Lbr Girish/2nd Weight Sex Delivery Anes PTL Lv   7 SAB         FD   6 Term     F CS-LTranv   ANTONIO   5 Term     F Vag-Spont   ANTONIO   4 Term     F Vag-Spont   ANTONIO   3 Term     F Vag-Spont   ANTONIO   2 Term     F Vag-Spont   ANTONIO   1      F Vag-Spont   ANTONIO       Review of Systems  Review of Systems   Constitutional: Negative for activity change, chills and fever.   Gastrointestinal: Negative for abdominal pain, constipation, diarrhea and nausea.   Genitourinary: Positive for vaginal discharge and vaginal odor. Negative for dysuria, frequency, hematuria, pelvic pain, urgency, vaginal bleeding and vaginal pain.   Integumentary:  Negative for rash.           Objective:    Physical Exam:   Constitutional: She is oriented to person, place, and time. She appears well-developed and well-nourished. No distress.    HENT:   Head: Atraumatic.              Genitourinary: Pelvic exam was performed with patient supine. There is no rash or tenderness on the right labia. There is no rash or tenderness on the left labia. No erythema or tenderness in the vagina. Vaginal discharge found.           Musculoskeletal: Moves all extremeties.      Lymphadenopathy:        Right: No inguinal adenopathy present.        Left: No inguinal adenopathy present.    Neurological: She  is alert and oriented to person, place, and time.    Skin: Skin is warm and dry. She is not diaphoretic.           Problem List Items Addressed This Visit     None      Visit Diagnoses     Vaginal discharge    -  Primary    Relevant Medications    metroNIDAZOLE (FLAGYL) 500 MG tablet    Other Relevant Orders    Vaginosis Screen by DNA Probe    C. trachomatis/N. gonorrhoeae by AMP DNA    Dysuria        Relevant Orders    Urine culture      Affirm  GC probe  UA with trace/1+ LE will send culture  Start Flagyl

## 2020-02-14 LAB
BACTERIA UR CULT: NORMAL
BACTERIA UR CULT: NORMAL
BACTERIAL VAGINOSIS DNA: POSITIVE
CANDIDA GLABRATA DNA: NEGATIVE
CANDIDA KRUSEI DNA: NEGATIVE
CANDIDA RRNA VAG QL PROBE: POSITIVE
T VAGINALIS RRNA GENITAL QL PROBE: NEGATIVE

## 2020-02-17 ENCOUNTER — TELEPHONE (OUTPATIENT)
Dept: OBSTETRICS AND GYNECOLOGY | Facility: CLINIC | Age: 40
End: 2020-02-17

## 2020-02-17 DIAGNOSIS — B96.89 BV (BACTERIAL VAGINOSIS): Primary | ICD-10-CM

## 2020-02-17 DIAGNOSIS — B96.89 BV (BACTERIAL VAGINOSIS): ICD-10-CM

## 2020-02-17 DIAGNOSIS — N76.0 BV (BACTERIAL VAGINOSIS): ICD-10-CM

## 2020-02-17 DIAGNOSIS — N76.0 BV (BACTERIAL VAGINOSIS): Primary | ICD-10-CM

## 2020-02-17 LAB
C TRACH DNA SPEC QL NAA+PROBE: NOT DETECTED
N GONORRHOEA DNA SPEC QL NAA+PROBE: NOT DETECTED

## 2020-02-17 RX ORDER — BORIC ACID
1 GRANULES (GRAM) MISCELLANEOUS NIGHTLY
Qty: 1 BOTTLE | Refills: 0 | Status: SHIPPED | OUTPATIENT
Start: 2020-02-17 | End: 2020-02-17 | Stop reason: SDUPTHER

## 2020-02-17 RX ORDER — BORIC ACID
1 GRANULES (GRAM) MISCELLANEOUS NIGHTLY
Qty: 1 BOTTLE | Refills: 0 | Status: SHIPPED | OUTPATIENT
Start: 2020-02-17 | End: 2020-03-18

## 2020-02-17 NOTE — TELEPHONE ENCOUNTER
----- Message from Marti Carmichael sent at 2/17/2020  1:43 PM CST -----  Contact: self  Patient requesting a call back regarding her test results and her medications from 02/13/2020. She would like to ask a few questions. Please call pt back at [phone]

## 2020-02-18 ENCOUNTER — TELEPHONE (OUTPATIENT)
Dept: OBSTETRICS AND GYNECOLOGY | Facility: CLINIC | Age: 40
End: 2020-02-18

## 2020-02-18 DIAGNOSIS — B37.31 CANDIDA VAGINITIS: Primary | ICD-10-CM

## 2020-02-18 NOTE — TELEPHONE ENCOUNTER
----- Message from Tanika Martinez sent at 2/18/2020  4:18 PM CST -----  Contact: self [phone]  States that her pharm has not received the rx for diflucan. Pt uses     [id number] - [place], [place] - [address]   [address] [place] [address]  Phone: [phone] Fax: [phone]    Please call back at [phone]//thank you acc

## 2020-02-19 RX ORDER — FLUCONAZOLE 150 MG/1
150 TABLET ORAL DAILY
Qty: 2 TABLET | Refills: 0 | Status: SHIPPED | OUTPATIENT
Start: 2020-02-19 | End: 2020-02-20

## 2020-02-19 NOTE — TELEPHONE ENCOUNTER
Attempted to call pt to inform Diflucan was sent, no answer, unable to leave message. Will try again later.

## 2020-03-02 ENCOUNTER — PATIENT MESSAGE (OUTPATIENT)
Dept: OBSTETRICS AND GYNECOLOGY | Facility: HOSPITAL | Age: 40
End: 2020-03-02

## 2020-07-06 ENCOUNTER — OFFICE VISIT (OUTPATIENT)
Dept: OBSTETRICS AND GYNECOLOGY | Facility: CLINIC | Age: 40
End: 2020-07-06
Payer: MEDICAID

## 2020-07-06 ENCOUNTER — TELEPHONE (OUTPATIENT)
Dept: OBSTETRICS AND GYNECOLOGY | Facility: CLINIC | Age: 40
End: 2020-07-06

## 2020-07-06 VITALS
DIASTOLIC BLOOD PRESSURE: 72 MMHG | SYSTOLIC BLOOD PRESSURE: 104 MMHG | HEIGHT: 62 IN | BODY MASS INDEX: 32.76 KG/M2 | WEIGHT: 178 LBS

## 2020-07-06 DIAGNOSIS — Z01.419 ROUTINE GYNECOLOGICAL EXAMINATION: Primary | ICD-10-CM

## 2020-07-06 DIAGNOSIS — N89.8 VAGINAL ITCHING: ICD-10-CM

## 2020-07-06 DIAGNOSIS — N89.8 VAGINAL ODOR: ICD-10-CM

## 2020-07-06 DIAGNOSIS — Z11.3 SCREENING FOR STDS (SEXUALLY TRANSMITTED DISEASES): ICD-10-CM

## 2020-07-06 PROCEDURE — 87491 CHLMYD TRACH DNA AMP PROBE: CPT

## 2020-07-06 PROCEDURE — 99999 PR PBB SHADOW E&M-EST. PATIENT-LVL III: CPT | Mod: PBBFAC,,, | Performed by: NURSE PRACTITIONER

## 2020-07-06 PROCEDURE — 87801 DETECT AGNT MULT DNA AMPLI: CPT

## 2020-07-06 PROCEDURE — 99395 PREV VISIT EST AGE 18-39: CPT | Mod: S$PBB,,, | Performed by: NURSE PRACTITIONER

## 2020-07-06 PROCEDURE — 87481 CANDIDA DNA AMP PROBE: CPT | Mod: 59

## 2020-07-06 PROCEDURE — 99213 OFFICE O/P EST LOW 20 MIN: CPT | Mod: PBBFAC | Performed by: NURSE PRACTITIONER

## 2020-07-06 PROCEDURE — 99395 PR PREVENTIVE VISIT,EST,18-39: ICD-10-PCS | Mod: S$PBB,,, | Performed by: NURSE PRACTITIONER

## 2020-07-06 PROCEDURE — 99999 PR PBB SHADOW E&M-EST. PATIENT-LVL III: ICD-10-PCS | Mod: PBBFAC,,, | Performed by: NURSE PRACTITIONER

## 2020-07-06 RX ORDER — METRONIDAZOLE 500 MG/1
500 TABLET ORAL EVERY 12 HOURS
Qty: 14 TABLET | Refills: 0 | Status: SHIPPED | OUTPATIENT
Start: 2020-07-06 | End: 2020-07-13

## 2020-07-06 RX ORDER — ADAPALENE AND BENZOYL PEROXIDE 3; 25 MG/G; MG/G
GEL TOPICAL
COMMUNITY
Start: 2020-05-29 | End: 2021-01-29

## 2020-07-06 RX ORDER — FLUCONAZOLE 150 MG/1
TABLET ORAL
Qty: 1 TABLET | Refills: 0 | Status: SHIPPED | OUTPATIENT
Start: 2020-07-06 | End: 2020-07-24

## 2020-07-06 RX ORDER — SPIRONOLACTONE 100 MG/1
100 TABLET, FILM COATED ORAL
COMMUNITY
Start: 2020-05-29 | End: 2021-01-29

## 2020-07-06 NOTE — TELEPHONE ENCOUNTER
Pt is requesting a appt for vag discharge. Assisted pt in scheduling visit with matthew moore. AURELIO Spicer        ----- Message from Arcenio Munoz sent at 7/6/2020 10:42 AM CDT -----  Regarding: returning the call  Contact: Pt  Pt is returning the staff call  Pt call back to be advised [phone]    Thanks

## 2020-07-06 NOTE — TELEPHONE ENCOUNTER
----- Message from Vonda Felix sent at 7/6/2020  9:09 AM CDT -----  Regarding: appt  Contact: self [phone]  Pt would like to schedule appt to see dr marianna garcia due to abnormal discharge and mole under breast. Please call and advise

## 2020-07-07 NOTE — PROGRESS NOTES
CC: Well woman exam    HPI  Fco Watts is a 39 y.o. female  presents for a well woman exam.  LMP: No LMP recorded (lmp unknown). Patient has had a hysterectomy..  C/O white discharge with fishy odor and irritation that started 2020 and since then has worsened. State that she suffers from bv and yeast infections often. Reviewed vaginal hygiene practices, client states that she she has tried everything and the symptoms continue to return.    Past Medical History:   Diagnosis Date    Abnormal Pap smear of cervix     ADD (attention deficit disorder) without hyperactivity 2019    Prescribed Adderall 10 1 po qd by her previous PCP, Dr. Dorita Cota, which she takes prn inattention while studying    Attention deficit hyperactivity disorder (ADHD), predominantly inattentive type 5/3/2019    Dyslipidemia     low HDL    Thyroid disease     h/o hypothyroidism    Vitamin D deficiency      Past Surgical History:   Procedure Laterality Date     SECTION  2010    DILATION AND CURETTAGE OF UTERUS      stillbirth    HYSTERECTOMY      Still has ovaries (hx abnl pap); mayda     Social History     Socioeconomic History    Marital status: Single     Spouse name: Not on file    Number of children: Not on file    Years of education: Not on file    Highest education level: Not on file   Occupational History    Not on file   Social Needs    Financial resource strain: Not on file    Food insecurity     Worry: Not on file     Inability: Not on file    Transportation needs     Medical: Not on file     Non-medical: Not on file   Tobacco Use    Smoking status: Never Smoker    Smokeless tobacco: Never Used   Substance and Sexual Activity    Alcohol use: Not Currently     Alcohol/week: 0.0 - 1.0 standard drinks    Drug use: No    Sexual activity: Yes     Partners: Male     Birth control/protection: None     Comment:    Lifestyle    Physical activity     Days per week: Not  on file     Minutes per session: Not on file    Stress: Not on file   Relationships    Social connections     Talks on phone: Not on file     Gets together: Not on file     Attends [employer] service: Not on file     Active member of club or organization: Not on file     Attends meetings of clubs or organizations: Not on file     Relationship status: Not on file   Other Topics Concern    Not on file   Social History Narrative    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); kids are 23 years, who is an RN; wt goal 150 lbs         Breakfast: Skips (previously 5%, previously 25%: cereal or McDonalds: sausage biscuit or steak McMuffin (previously meal replacement shake); unsweetened lemon water (previously Cokes)    Lunch: 75% Hamberger or pizza or fried chicken (previously grilled chicken or salad); 75% burger & chip (previous 25%); water    Dinner: Home cooked: baked chicken or hamberger steak & gravy (occasionally 1 slice pizza or fried chicken); cranberry juice or soda or water    Snacks: Candy bar 2-3x/wk (previously ice cream 1x/d or chips 2x/wk or previoulsy Star crunch pie)     Eats out: 3x/m (previously 5x/wk)    Water: 36 oz/d (previously 16 oz/d, previously 48-64)    PA: None (previously 3x/wk; each 1 hour)    Goal weight is 150 lbs     Family History   Problem Relation Age of Onset    Colon cancer Father     Cancer Father     Hypertension Mother     Breast cancer Neg Hx     Ovarian cancer Neg Hx      OB History        7    Para   6    Term   5       1    AB   1    Living   6       SAB   1    TAB        Ectopic        Multiple        Live Births   6                 Current Outpatient Medications:     adapalene-benzoyl peroxide (EPIDUO FORTE) 0.3-2.5 % GlwP, Apply pea sized amount to face daily. Start off a few nights weekly and increase to nightly as tolerated., Disp: , Rfl:     norgestimate-ethinyl estradiol (ORTHO TRI-CYCLEN,TRI-SPRINTEC)  "0.18/0.215/0.25 mg-35 mcg (28) tablet, Take 1 tablet by mouth., Disp: , Rfl:     spironolactone (ALDACTONE) 100 MG tablet, Take 100 mg by mouth., Disp: , Rfl:     fluconazole (DIFLUCAN) 150 MG Tab, Take one dose now for vaginal itching, Disp: 1 tablet, Rfl: 0    metroNIDAZOLE (FLAGYL) 500 MG tablet, Take 1 tablet (500 mg total) by mouth every 12 (twelve) hours. for 7 days, Disp: 14 tablet, Rfl: 0    GYNECOLOGY HISTORY:  Hysterectomy 2012 re:  Still birth bleeding. Client states that cervix is still present   Currently taking Ortho Tri cyclen which is ordered through her dermatologist for acne     DATA REVIEWED:  Last pap: 6/19/2017 Normal results     /72   Ht 5' 2" (1.575 m)   Wt 80.7 kg (178 lb)   LMP  (LMP Unknown) Comment: Partial Hysterectomy   BMI 32.56 kg/m²     Review of Systems   Constitutional: Negative.    HENT: Negative.    Eyes: Negative.    Respiratory: Negative.    Cardiovascular: Negative.    Gastrointestinal: Negative.    Endocrine: Negative.    Genitourinary: Positive for vaginal discharge.   Musculoskeletal: Negative.    Skin: Negative.    Allergic/Immunologic: Negative.    Neurological: Negative.    Hematological: Negative.    Psychiatric/Behavioral: Negative.        Physical Exam  Constitutional:       Appearance: Normal appearance.   HENT:      Head: Normocephalic.   Eyes:      Extraocular Movements: Extraocular movements intact.   Neck:      Musculoskeletal: Normal range of motion.   Pulmonary:      Effort: Pulmonary effort is normal.   Abdominal:      General: Abdomen is flat.      Palpations: Abdomen is soft.   Genitourinary:     General: Normal vulva.      Exam position: Supine.      Rectum: Normal.      Comments: Cervix absent  Uterus absent   Musculoskeletal: Normal range of motion.   Skin:     General: Skin is warm and dry.   Neurological:      Mental Status: She is alert and oriented to person, place, and time.   Psychiatric:         Mood and Affect: Mood normal.         " Behavior: Behavior normal.         Thought Content: Thought content normal.         Judgment: Judgment normal.         Routine gynecological examination    Vaginal odor  -     metroNIDAZOLE (FLAGYL) 500 MG tablet; Take 1 tablet (500 mg total) by mouth every 12 (twelve) hours. for 7 days  Dispense: 14 tablet; Refill: 0  -     Vaginosis Screen by DNA Probe    Vaginal itching  -     fluconazole (DIFLUCAN) 150 MG Tab; Take one dose now for vaginal itching  Dispense: 1 tablet; Refill: 0    Screening for STDs (sexually transmitted diseases)  -     C. trachomatis/N. gonorrhoeae by AMP DNA [facility]; Vagina    Will treat for bv and yeast today. Recurrent bv and yeast treatment pending affirm results   Unable to visualize cervix, client has an appt with Dr. Irma Long on 7/23/2020 for Annual, Annual done today will see Dr. Long to collect pap smear.     Patient was counseled today on A.C.S. Pap guidelines (Q3) and recommendations for yearly pelvic exams, yearly mammograms starting age 40, and clinical breast exams; to see her PCP for other health maintenance.

## 2020-07-08 LAB
C TRACH DNA SPEC QL NAA+PROBE: NOT DETECTED
N GONORRHOEA DNA SPEC QL NAA+PROBE: NOT DETECTED

## 2020-07-09 ENCOUNTER — TELEPHONE (OUTPATIENT)
Dept: OBSTETRICS AND GYNECOLOGY | Facility: CLINIC | Age: 40
End: 2020-07-09

## 2020-07-09 DIAGNOSIS — N89.8 VAGINAL ITCHING: ICD-10-CM

## 2020-07-09 RX ORDER — FLUCONAZOLE 100 MG/1
150 TABLET ORAL ONCE
Qty: 1 TABLET | Refills: 0 | Status: SHIPPED | OUTPATIENT
Start: 2020-07-09 | End: 2020-07-09

## 2020-07-09 NOTE — TELEPHONE ENCOUNTER
Attempting to call client to tell her that she definitely does not have a cervix and because she had a hysterectomy for non cancerous reasons,  she no longer needs to have pap smears. However she will still need Well Woman Exams year. If the only reason you have an a[[t with Dr. Long is for a pap collection attempt you may cancel that appt.   Unable to leave message voicemail not set up.

## 2020-07-09 NOTE — TELEPHONE ENCOUNTER
Chiqui spoke to pt. And notified her that she does not have a cervix, her Rx was sent to the pharmacy,  and about canceling her appt. With Dr. Long. The patient voiced understanding. ( Chiqui also documented  in a previous message exactly what she told the pt.)

## 2020-07-09 NOTE — TELEPHONE ENCOUNTER
----- Message from Sophie Ackerman sent at 7/9/2020  2:52 PM CDT -----  Regarding: call back  Type:  Patient Returning Call    Who Called:Patient  Who Left Message for Patient nurse   Does the patient know what this is regarding?:na  Would the patient rather a call back or a response via [facility]? Call back  Best Call Back Number:[phone]  Additional Information:na

## 2020-07-09 NOTE — TELEPHONE ENCOUNTER
----- Message from Leena Ozuna sent at 7/9/2020 12:36 PM CDT -----  Regarding: MEDICATION NOT WORKING  Contact: PATIENT  STATES THE MEDICATION GIVEN Monday IS NOT HELPING HER. PLEASE CALL PATIENT @ [phone]. THANKS      [id number] - [place], [place]  [address]  [address]  [place] [address]  Phone: [phone] Fax: [phone]

## 2020-07-16 LAB
BACTERIAL VAGINOSIS DNA: POSITIVE
CANDIDA GLABRATA DNA: NEGATIVE
CANDIDA KRUSEI DNA: NEGATIVE
CANDIDA RRNA VAG QL PROBE: POSITIVE
T VAGINALIS RRNA GENITAL QL PROBE: NEGATIVE

## 2020-07-23 ENCOUNTER — TELEPHONE (OUTPATIENT)
Dept: OBSTETRICS AND GYNECOLOGY | Facility: CLINIC | Age: 40
End: 2020-07-23

## 2020-07-23 NOTE — TELEPHONE ENCOUNTER
----- Message from Tisha Engle sent at 7/23/2020  2:12 PM CDT -----  Regarding: no appt  Pt needing a call regarding why she is not having her appt on today . The pt is in the lobby but no appt is shown.. Please assist pt     Pt contact #[phone]

## 2020-07-23 NOTE — TELEPHONE ENCOUNTER
Spoke with patient, she says that the issue has already been resolved.  Confirmed appt for tomorrow with Dr. Long.

## 2020-07-24 ENCOUNTER — TELEPHONE (OUTPATIENT)
Dept: INTERNAL MEDICINE | Facility: CLINIC | Age: 40
End: 2020-07-24

## 2020-07-24 ENCOUNTER — OFFICE VISIT (OUTPATIENT)
Dept: OBSTETRICS AND GYNECOLOGY | Facility: CLINIC | Age: 40
End: 2020-07-24
Payer: MEDICAID

## 2020-07-24 ENCOUNTER — OFFICE VISIT (OUTPATIENT)
Dept: INTERNAL MEDICINE | Facility: CLINIC | Age: 40
End: 2020-07-24
Payer: MEDICAID

## 2020-07-24 VITALS
DIASTOLIC BLOOD PRESSURE: 78 MMHG | BODY MASS INDEX: 32.49 KG/M2 | SYSTOLIC BLOOD PRESSURE: 122 MMHG | WEIGHT: 176.56 LBS | HEIGHT: 62 IN

## 2020-07-24 VITALS
BODY MASS INDEX: 32.64 KG/M2 | OXYGEN SATURATION: 98 % | SYSTOLIC BLOOD PRESSURE: 118 MMHG | HEIGHT: 62 IN | WEIGHT: 177.38 LBS | RESPIRATION RATE: 17 BRPM | HEART RATE: 72 BPM | DIASTOLIC BLOOD PRESSURE: 72 MMHG | TEMPERATURE: 99 F

## 2020-07-24 DIAGNOSIS — E66.9 OBESITY (BMI 30.0-34.9): ICD-10-CM

## 2020-07-24 DIAGNOSIS — B96.89 BACTERIAL VAGINOSIS: Primary | ICD-10-CM

## 2020-07-24 DIAGNOSIS — F90.0 ATTENTION DEFICIT HYPERACTIVITY DISORDER (ADHD), PREDOMINANTLY INATTENTIVE TYPE: Primary | ICD-10-CM

## 2020-07-24 DIAGNOSIS — Z11.59 ENCOUNTER FOR HEPATITIS C SCREENING TEST FOR LOW RISK PATIENT: ICD-10-CM

## 2020-07-24 DIAGNOSIS — L70.9 ACNE, UNSPECIFIED ACNE TYPE: ICD-10-CM

## 2020-07-24 DIAGNOSIS — N76.0 BACTERIAL VAGINOSIS: Primary | ICD-10-CM

## 2020-07-24 DIAGNOSIS — N89.8 VAGINAL ITCHING: ICD-10-CM

## 2020-07-24 PROCEDURE — 99999 PR PBB SHADOW E&M-EST. PATIENT-LVL III: CPT | Mod: PBBFAC,,, | Performed by: FAMILY MEDICINE

## 2020-07-24 PROCEDURE — 99214 PR OFFICE/OUTPT VISIT, EST, LEVL IV, 30-39 MIN: ICD-10-PCS | Mod: S$PBB,,, | Performed by: FAMILY MEDICINE

## 2020-07-24 PROCEDURE — 99214 PR OFFICE/OUTPT VISIT, EST, LEVL IV, 30-39 MIN: ICD-10-PCS | Mod: S$PBB,,, | Performed by: OBSTETRICS & GYNECOLOGY

## 2020-07-24 PROCEDURE — 99212 OFFICE O/P EST SF 10 MIN: CPT | Mod: PBBFAC,PN | Performed by: OBSTETRICS & GYNECOLOGY

## 2020-07-24 PROCEDURE — 99214 OFFICE O/P EST MOD 30 MIN: CPT | Mod: S$PBB,,, | Performed by: OBSTETRICS & GYNECOLOGY

## 2020-07-24 PROCEDURE — 99999 PR PBB SHADOW E&M-EST. PATIENT-LVL III: ICD-10-PCS | Mod: PBBFAC,,, | Performed by: FAMILY MEDICINE

## 2020-07-24 PROCEDURE — 99214 OFFICE O/P EST MOD 30 MIN: CPT | Mod: S$PBB,,, | Performed by: FAMILY MEDICINE

## 2020-07-24 PROCEDURE — 99999 PR PBB SHADOW E&M-EST. PATIENT-LVL II: CPT | Mod: PBBFAC,,, | Performed by: OBSTETRICS & GYNECOLOGY

## 2020-07-24 PROCEDURE — 99999 PR PBB SHADOW E&M-EST. PATIENT-LVL II: ICD-10-PCS | Mod: PBBFAC,,, | Performed by: OBSTETRICS & GYNECOLOGY

## 2020-07-24 PROCEDURE — 99213 OFFICE O/P EST LOW 20 MIN: CPT | Mod: PBBFAC,27,PN | Performed by: FAMILY MEDICINE

## 2020-07-24 RX ORDER — DEXTROAMPHETAMINE SACCHARATE, AMPHETAMINE ASPARTATE MONOHYDRATE, DEXTROAMPHETAMINE SULFATE AND AMPHETAMINE SULFATE 2.5; 2.5; 2.5; 2.5 MG/1; MG/1; MG/1; MG/1
10 CAPSULE, EXTENDED RELEASE ORAL EVERY MORNING
Qty: 30 CAPSULE | Refills: 0 | Status: SHIPPED | OUTPATIENT
Start: 2020-07-24 | End: 2021-01-29

## 2020-07-24 RX ORDER — METRONIDAZOLE 7.5 MG/G
GEL VAGINAL
Qty: 70 G | Refills: 1 | Status: SHIPPED | OUTPATIENT
Start: 2020-07-24 | End: 2020-07-29 | Stop reason: CLARIF

## 2020-07-24 RX ORDER — FLUCONAZOLE 200 MG/1
200 TABLET ORAL DAILY
Qty: 3 TABLET | Refills: 0 | Status: SHIPPED | OUTPATIENT
Start: 2020-07-24 | End: 2020-07-27

## 2020-07-24 NOTE — TELEPHONE ENCOUNTER
----- Message from Marisol Montano sent at 7/24/2020  9:27 AM CDT -----  Regarding: Med refill  Pt is requesting call back in regards to scheduling appt for med refill [facility]              Pls call back at [phone]

## 2020-07-24 NOTE — PROGRESS NOTES
Subjective:       Patient ID: Fco Watts is a 39 y.o. female.    Chief Complaint:  Vaginitis      History of Present Illness  HPI  here for problem   Feels recently treated for by/yeast with flagyl and diflucan but feels symtpoms are still present    Does not think the medication worked    GYN & OB History  No LMP recorded (lmp unknown). Patient has had a hysterectomy.   Date of Last Pap: 2017    OB History    Para Term  AB Living   7 6 5 1 1 6   SAB TAB Ectopic Multiple Live Births   1       6      # Outcome Date GA Lbr Girish/2nd Weight Sex Delivery Anes PTL Lv   7 SAB         FD   6 Term     F CS-LTranv   ANTONIO   5 Term     F Vag-Spont   ANTONIO   4 Term     F Vag-Spont   ANTONIO   3 Term     F Vag-Spont   ANTONIO   2 Term     F Vag-Spont   ANTONIO   1      F Vag-Spont   ANTONIO       Review of Systems  Review of Systems   Genitourinary: Positive for vaginal discharge and vaginal odor.   All other systems reviewed and are negative.          Objective:      Physical Exam:   Constitutional: She appears well-developed.     Eyes: Pupils are equal, round, and reactive to light. Conjunctivae and EOM are normal.    Neck: Normal range of motion. Neck supple.     Pulmonary/Chest: Effort normal.        Abdominal: Soft.             Musculoskeletal: Normal range of motion.       Neurological: She is alert.    Skin: Skin is warm.    Psychiatric: She has a normal mood and affect.           Assessment:        1. Bacterial vaginosis    2. Vaginal itching               Plan:      Accepts 3 mo trial of metrogel; rx sent  rx sent for diflucan  Continue ww exam yearly

## 2020-07-28 ENCOUNTER — TELEPHONE (OUTPATIENT)
Dept: OBSTETRICS AND GYNECOLOGY | Facility: CLINIC | Age: 40
End: 2020-07-28

## 2020-07-28 DIAGNOSIS — N76.0 BACTERIAL VAGINOSIS: Primary | ICD-10-CM

## 2020-07-28 DIAGNOSIS — B96.89 BACTERIAL VAGINOSIS: Primary | ICD-10-CM

## 2020-07-28 NOTE — TELEPHONE ENCOUNTER
----- Message from Rina Medrano sent at 7/28/2020  9:48 AM CDT -----  Contact: [phone]  Would like to consult with the nurse, patient has some question concerning some medication that she was giving, please call back at [phone], thanks sj

## 2020-07-28 NOTE — TELEPHONE ENCOUNTER
Pt stated that when she went to pharmacy she was told that the Metrogel is not covered under her insurance and that she will need an Rx called in for the generic brand. I advised patient that I would send a message to  about her calling in an Rx for the generic brand, patient voiced understanding.     ----- Message from Polo Hicks sent at 7/28/2020  1:44 PM CDT -----  Contact: self  Type:  Patient Returning Call    Who Called:Fco Watts   Who Left Message for Patient:nurse  Does the patient know what this is regarding?:medication not covered (need generic brand)  Would the patient rather a call back or a response via [facility]? Call back  Best Call Back Number:[phone]  Additional Information:

## 2020-07-28 NOTE — TELEPHONE ENCOUNTER
Called pt, NA/ N VM available. Cdickey LPN       Would like to consult with the nurse, patient has some question concerning some medication that she was giving, please call back at [phone], thanks sj

## 2020-07-28 NOTE — TELEPHONE ENCOUNTER
Please start PA    Pt has xenia pearce    Metronidazole is the generic    Can call pharmacy to see what is covered

## 2020-07-29 ENCOUNTER — TELEPHONE (OUTPATIENT)
Dept: OBSTETRICS AND GYNECOLOGY | Facility: CLINIC | Age: 40
End: 2020-07-29

## 2020-07-29 NOTE — TELEPHONE ENCOUNTER
Please advise pt the vaginal gel is not available    Vaginal suppository sent for clindamycin    3 days in a row the first week then twice a week for 11 weeks

## 2020-07-29 NOTE — TELEPHONE ENCOUNTER
Attempted to contact patient. No answer, unable to leave voice mail.     ----- Message from Sondra Washburn sent at 7/29/2020  2:35 PM CDT -----  Regarding: return call  .Type:  Patient Returning Call    Who Called: pt  Who Left Message for Patient:   Does the patient know what this is regarding?:   Would the patient rather a call back or a response via [facility]? Call back   Best Call Back Number: [phone]  Additional Information:

## 2020-07-29 NOTE — TELEPHONE ENCOUNTER
Spoke with pt Per ISA leroy, she states Dr Long is supposed to be send ing another RX to the pharmacy. Radha CHAVEZ

## 2020-07-29 NOTE — TELEPHONE ENCOUNTER
Spoke with pharmacy, pharmacy stated that they currently don't have the brand name and they're not able to get it in, pharmacy tech also stated that no other pharmacy have the brand name at this name.     I tried to contact the patient to let the patient know but wasn't able to get in contact with patient or lvm.

## 2020-07-29 NOTE — TELEPHONE ENCOUNTER
Spoke with pharmacy, pharmacy stated that the insurance will not cover the generic it will only cover the brand name/ name brand.

## 2020-07-29 NOTE — TELEPHONE ENCOUNTER
Do they fausto vandazole? Covered?  Clindesse? Coverage?  Cleocin vag supp? Coverage?    Solosec? Coverage?

## 2020-07-29 NOTE — TELEPHONE ENCOUNTER
----- Message from Jhon Long sent at 7/29/2020  3:20 PM CDT -----  Regarding: Medication  Contact: LOVE BECK [[id number]]  Type:  Patient Returning Call    Who Called: LOVE BECK [[id number]]    Who Left Message for Patient: Jessica Medrano    Does the patient know what this is regarding?: yes    Would the patient rather a call back or a response via [facility]? call    Best Call Back Number: [phone]    Additional Information: Patient states it is okay to move forward with the suppository

## 2020-08-03 ENCOUNTER — NURSE TRIAGE (OUTPATIENT)
Dept: ADMINISTRATIVE | Facility: CLINIC | Age: 40
End: 2020-08-03

## 2020-08-03 ENCOUNTER — OFFICE VISIT (OUTPATIENT)
Dept: URGENT CARE | Facility: CLINIC | Age: 40
End: 2020-08-03
Payer: MEDICAID

## 2020-08-03 VITALS
WEIGHT: 177 LBS | OXYGEN SATURATION: 98 % | DIASTOLIC BLOOD PRESSURE: 81 MMHG | BODY MASS INDEX: 32.57 KG/M2 | TEMPERATURE: 99 F | HEART RATE: 120 BPM | SYSTOLIC BLOOD PRESSURE: 126 MMHG | RESPIRATION RATE: 18 BRPM | HEIGHT: 62 IN

## 2020-08-03 DIAGNOSIS — R06.02 SHORTNESS OF BREATH: ICD-10-CM

## 2020-08-03 DIAGNOSIS — R05.9 COUGH: ICD-10-CM

## 2020-08-03 DIAGNOSIS — R50.9 FEVER, UNSPECIFIED FEVER CAUSE: ICD-10-CM

## 2020-08-03 DIAGNOSIS — R11.0 NAUSEA: ICD-10-CM

## 2020-08-03 DIAGNOSIS — R19.7 DIARRHEA, UNSPECIFIED TYPE: ICD-10-CM

## 2020-08-03 DIAGNOSIS — R50.9 FEVER: ICD-10-CM

## 2020-08-03 PROBLEM — Z11.59 ENCOUNTER FOR HEPATITIS C SCREENING TEST FOR LOW RISK PATIENT: Status: ACTIVE | Noted: 2020-08-03

## 2020-08-03 PROCEDURE — 99214 PR OFFICE/OUTPT VISIT, EST, LEVL IV, 30-39 MIN: ICD-10-PCS | Mod: S$GLB,,, | Performed by: PHYSICIAN ASSISTANT

## 2020-08-03 PROCEDURE — 99214 OFFICE O/P EST MOD 30 MIN: CPT | Mod: S$GLB,,, | Performed by: PHYSICIAN ASSISTANT

## 2020-08-03 PROCEDURE — U0003 INFECTIOUS AGENT DETECTION BY NUCLEIC ACID (DNA OR RNA); SEVERE ACUTE RESPIRATORY SYNDROME CORONAVIRUS 2 (SARS-COV-2) (CORONAVIRUS DISEASE [COVID-19]), AMPLIFIED PROBE TECHNIQUE, MAKING USE OF HIGH THROUGHPUT TECHNOLOGIES AS DESCRIBED BY CMS-2020-01-R: HCPCS

## 2020-08-03 RX ORDER — PROMETHAZINE HYDROCHLORIDE AND DEXTROMETHORPHAN HYDROBROMIDE 6.25; 15 MG/5ML; MG/5ML
5 SYRUP ORAL EVERY 6 HOURS PRN
Qty: 118 ML | Refills: 0 | Status: SHIPPED | OUTPATIENT
Start: 2020-08-03 | End: 2020-08-13 | Stop reason: SDUPTHER

## 2020-08-03 RX ORDER — ONDANSETRON 4 MG/1
4 TABLET, FILM COATED ORAL EVERY 8 HOURS PRN
Qty: 30 TABLET | Refills: 0 | Status: SHIPPED | OUTPATIENT
Start: 2020-08-03 | End: 2021-01-29

## 2020-08-03 NOTE — PROGRESS NOTES
"Subjective:       Patient ID: Shawabhishek Watts is a 39 y.o. female.    Chief Complaint: ADHD    HPI  Came in today for follow-up on ADHD.  She has been on same dose of medication for a while without any negative side effects.  No palpitations in the weight loss issues.    Family History   Problem Relation Age of Onset    Colon cancer Father     Cancer Father     Hypertension Mother     Breast cancer Neg Hx     Ovarian cancer Neg Hx        Current Outpatient Medications:     norgestimate-ethinyl estradiol (ORTHO TRI-CYCLEN,TRI-SPRINTEC) 0.18/0.215/0.25 mg-35 mcg (28) tablet, Take 1 tablet by mouth., Disp: , Rfl:     spironolactone (ALDACTONE) 100 MG tablet, Take 100 mg by mouth., Disp: , Rfl:     adapalene-benzoyl peroxide (EPIDUO FORTE) 0.3-2.5 % GlwP, Apply pea sized amount to face daily. Start off a few nights weekly and increase to nightly as tolerated., Disp: , Rfl:     clindamycin (CLEOCIN) 100 mg vaginal suppository, Place 1 suppository (100 mg total) vaginally every evening for 3 days, THEN 1 suppository (100 mg total) twice a week., Disp: 25 suppository, Rfl: 0    dextroamphetamine-amphetamine (ADDERALL XR) 10 MG 24 hr capsule, Take 1 capsule (10 mg total) by mouth every morning., Disp: 30 capsule, Rfl: 0    Review of Systems   Constitutional: Negative for chills and fever.   Respiratory: Negative for cough and shortness of breath.    Cardiovascular: Negative for chest pain.   Gastrointestinal: Negative for abdominal pain.   Skin: Negative for rash.   Neurological: Negative for dizziness.       Objective:   /72 (BP Location: Right arm, Patient Position: Sitting, BP Method: Large (Manual))   Pulse 72   Temp 99 °F (37.2 °C) (Temporal)   Resp 17   Ht 5' 2" (1.575 m)   Wt 80.4 kg (177 lb 5.8 oz)   LMP  (LMP Unknown) Comment: Partial Hysterectomy   SpO2 98%   BMI 32.44 kg/m²      Physical Exam  Vitals signs reviewed.   Constitutional:       Appearance: She is well-developed.   HENT: "      Head: Normocephalic and atraumatic.   Eyes:      Conjunctiva/sclera: Conjunctivae normal.   Cardiovascular:      Rate and Rhythm: Normal rate.   Pulmonary:      Effort: Pulmonary effort is normal. No respiratory distress.   Skin:     General: Skin is warm and dry.      Findings: No rash.   Neurological:      Mental Status: She is alert and oriented to person, place, and time.      Coordination: Coordination normal.   Psychiatric:         Behavior: Behavior normal.         Assessment & Plan     Problem List Items Addressed This Visit        Psychiatric    Attention deficit hyperactivity disorder (ADHD), predominantly inattentive type - Primary    Current Assessment & Plan     Stable on current med with no side effects. F/u every 3 months for monitoring         Relevant Medications    dextroamphetamine-amphetamine (ADDERALL XR) 10 MG 24 hr capsule       Derm    Acne    Current Assessment & Plan     Followed by derm. Checking cmp since on spironolactone         Relevant Orders    Comprehensive metabolic panel       ID    Encounter for hepatitis C screening test for low risk patient    Relevant Orders    Hepatitis C Antibody       Endocrine    Obesity (BMI 30.0-34.9)    Current Assessment & Plan     Counseled on importance on diet and exercise to decrease risk of comorbid conditions and for improved quality of life.                   No follow-ups on file.    Disclaimer:  This note may have been prepared using voice recognition software, it may have not been extensively proofed, as such there could be errors within the text such as sound alike errors.

## 2020-08-03 NOTE — PROGRESS NOTES
"Subjective:       Patient ID: Fco Watts is a 39 y.o. female.    Vitals:  height is 5' 2" (1.575 m) and weight is 80.3 kg (177 lb). Her temporal temperature is 98.6 °F (37 °C). Her blood pressure is 126/81 and her pulse is 120 (abnormal). Her respiration is 18 and oxygen saturation is 98%.     Chief Complaint: COVID-19 Concerns    Pt states her  and children tested positive for COVID.  Patient says that on Friday although she is asymptomatic at that time.  Test came back negative.  The next day she started to develop symptoms.  Pt presents to clinic with cough, SOB, loss of appetite, chills, fever, and fatigue.  She also reports nausea and 1 episode of vomiting over the weekend.  Patient has only been taking Tylenol for her symptoms which helps with the fever.  Denies any wheezing, chest pain, abdominal pain.    Cough  This is a new problem. The current episode started in the past 7 days. The problem has been unchanged. The problem occurs constantly. Associated symptoms include chills, a fever, myalgias and shortness of breath. Pertinent negatives include no chest pain, headaches, rash or sore throat.       Constitution: Positive for appetite change, chills, fatigue and fever.   HENT: Negative for congestion and sore throat.    Neck: Negative for painful lymph nodes.   Cardiovascular: Negative for chest pain and leg swelling.   Eyes: Negative for double vision and blurred vision.   Respiratory: Positive for cough and shortness of breath.    Gastrointestinal: Positive for nausea, vomiting and diarrhea.   Genitourinary: Negative for dysuria, frequency, urgency and history of kidney stones.   Musculoskeletal: Positive for muscle ache. Negative for joint pain, joint swelling and muscle cramps.   Skin: Negative for color change, pale, rash and bruising.   Allergic/Immunologic: Negative for seasonal allergies.   Neurological: Negative for dizziness, history of vertigo, light-headedness, passing out and " headaches.   Hematologic/Lymphatic: Negative for swollen lymph nodes.   Psychiatric/Behavioral: Negative for nervous/anxious, sleep disturbance and depression. The patient is not nervous/anxious.        Objective:      Physical Exam    Audio Only Telehealth Visit     The patient location is: [facility]  The chief complaint leading to consultation is: Cough, Fever, N/V  Visit type: Virtual visit with audio only (telephone)  Total time spent with patient: 10 min     The reason for the audio only service rather than synchronous audio and video virtual visit was related to technical difficulties or patient preference/necessity.     Each patient to whom I provide medical services by telemedicine is:  (1) informed of the relationship between the physician and patient and the respective role of any other health care provider with respect to management of the patient; and (2) notified that they may decline to receive medical services by telemedicine and may withdraw from such care at any time. Patient verbally consented to receive this service via voice-only telephone call.       HPI: See above     Assessment and plan:     - COVID-19 screen pending.  - Advised patient to assume positive for COVID-19 and self quarantine until receiving results. If positive results, pt instructed to stay home and self quarantine until 10 days from onset of symptoms, and asymptomatic for at least 72 hours prior to resuming normal activity.  -Tylenol or Ibuprofen for fever control. OTC medications prn for cold symptoms.   - Strict ED precautions given for any emergent symptoms.    This service was not originating from a related E/M service provided within the previous 7 days nor will  to an E/M service or procedure within the next 24 hours or my soonest available appointment.  Prevailing standard of care was able to be met in this audio-only visit.      Assessment:       1. Shortness of breath    2. Cough    3. Diarrhea    4. Nausea    5.  "Fever        Plan:       Strong suspicion for COVID given her entire family is currently infected with virus.  Take Zofran as needed for nausea and vomiting.  Recommend over-the-counter cold medications and Tylenol as needed for fever.  Patient instructed to stay quarantine until she receives her COVID results.  ER precautions discussed and patient voiced understanding.    Shortness of breath  -     COVID-19 Routine Screening    Cough  -     COVID-19 Routine Screening  -     promethazine-dextromethorphan (PROMETHAZINE-DM) 6.25-15 mg/5 mL Syrp; Take 5 mLs by mouth every 6 (six) hours as needed (cough).  Dispense: 118 mL; Refill: 0    Diarrhea  -     COVID-19 Routine Screening    Nausea  -     COVID-19 Routine Screening  -     ondansetron (ZOFRAN) 4 MG tablet; Take 1 tablet (4 mg total) by mouth every 8 (eight) hours as needed for Nausea.  Dispense: 30 tablet; Refill: 0    Fever  -     COVID-19 Routine Screening      Patient Instructions     Viral Syndrome (Adult)  A viral illness may cause a number of symptoms. The symptoms depend on the part of the body that the virus affects. If it settles in your nose, throat, and lungs, it may cause cough, sore throat, congestion, and sometimes headache. If it settles in your stomach and intestinal tract, it may cause vomiting and diarrhea. Sometimes it causes vague symptoms like "aching all over," feeling tired, loss of appetite, or fever.  A viral illness usually lasts 1 to 2 weeks, but sometimes it lasts longer. In some cases, a more serious infection can look like a viral syndrome in the first few days of the illness. You may need another exam and additional tests to know the difference. Watch for the warning signs listed below.  Home care  Follow these guidelines for taking care of yourself at home:  · If symptoms are severe, rest at home for the first 2 to 3 days.  · Stay away from cigarette smoke - both your smoke and the smoke from others.  · You may use " over-the-counter acetaminophen or ibuprofen for fever, muscle aching, and headache, unless another medicine was prescribed for this. If you have chronic liver or kidney disease or ever had a stomach ulcer or GI bleeding, talk with your doctor before using these medicines. No one who is younger than 18 and ill with a fever should take aspirin. It may cause severe disease or death.  · Your appetite may be poor, so a light diet is fine. Avoid dehydration by drinking 8 to 12 8-ounce glasses of fluids each day. This may include water; orange juice; lemonade; apple, grape, and cranberry juice; clear fruit drinks; electrolyte replacement and sports drinks; and decaffeinated teas and coffee. If you have been diagnosed with a kidney disease, ask your doctor how much and what types of fluids you should drink to prevent dehydration. If you have kidney disease, drinking too much fluid can cause it build up in the your body and be dangerous to your health.  · Over-the-counter remedies won't shorten the length of the illness but may be helpful for cough, sore throat; and nasal and sinus congestion. Don't use decongestants if you have high blood pressure.  Follow-up care  Follow up with your healthcare provider if you do not improve over the next week.  Call 911  Get emergency medical care if any of the following occur:  · Convulsion  · Feeling weak, dizzy, or like you are going to faint  · Chest pain, shortness of breath, wheezing, or difficulty breathing  When to seek medical advice  Call your healthcare provider right away if any of these occur:  · Cough with lots of colored sputum (mucus) or blood in your sputum  · Chest pain, shortness of breath, wheezing, or difficulty breathing  · Severe headache; face, neck, or ear pain  · Severe, constant pain in the lower right side of your belly (abdominal)  · Continued vomiting (cant keep liquids down)  · Frequent diarrhea (more than 5 times a day); blood (red or black color) or mucus  in diarrhea  · Feeling weak, dizzy, or like you are going to faint  · Extreme thirst  · Fever of 100.4°F (38°C) or higher, or as directed by your healthcare provider  Date Last Reviewed: 9/25/2015  © 4954-1419 [employer] [address] [place] [address]. All rights reserved. This information is not intended as a substitute for professional medical care. Always follow your healthcare professional's instructions.

## 2020-08-03 NOTE — TELEPHONE ENCOUNTER
Pt's  was tested early last week, found he was positive on Thursday, both daughters tested positive for COVID-19 on Thursday, also.  Pt was tested on Friday, and results were negative.  Friday evening she began having symptoms of COVID-19, chills, body aches, fever (101.2), diarrhea, cough, and mild sob.  Advised she be seen in [facility] for evaluation and possible re-test.  Reason for Disposition   MILD difficulty breathing (e.g., minimal/no SOB at rest, SOB with walking, pulse <100)    Additional Information   Negative: SEVERE difficulty breathing (e.g., struggling for each breath, speaks in single words)   Negative: Difficult to awaken or acting confused (e.g., disoriented, slurred speech)   Negative: Bluish (or gray) lips or face now   Negative: Shock suspected (e.g., cold/pale/clammy skin, too weak to stand, low BP, rapid pulse)   Negative: Sounds like a life-threatening emergency to the triager   Negative: [1] COVID-19 exposure AND [2] NO symptoms   Negative: COVID-19 and Breastfeeding, questions about   Negative: [1] Adult with possible COVID-19 symptoms AND [2] triager concerned about severity of symptoms or other causes   Negative: SEVERE or constant chest pain or pressure (Exception: mild central chest pain, present only when coughing)   Negative: MODERATE difficulty breathing (e.g., speaks in phrases, SOB even at rest, pulse 100-120)   Negative: Chest pain   Negative: Patient sounds very sick or weak to the triager    Protocols used: CORONAVIRUS (COVID-19) DIAGNOSED OR [id number]

## 2020-08-03 NOTE — PATIENT INSTRUCTIONS
"  Viral Syndrome (Adult)  A viral illness may cause a number of symptoms. The symptoms depend on the part of the body that the virus affects. If it settles in your nose, throat, and lungs, it may cause cough, sore throat, congestion, and sometimes headache. If it settles in your stomach and intestinal tract, it may cause vomiting and diarrhea. Sometimes it causes vague symptoms like "aching all over," feeling tired, loss of appetite, or fever.  A viral illness usually lasts 1 to 2 weeks, but sometimes it lasts longer. In some cases, a more serious infection can look like a viral syndrome in the first few days of the illness. You may need another exam and additional tests to know the difference. Watch for the warning signs listed below.  Home care  Follow these guidelines for taking care of yourself at home:  · If symptoms are severe, rest at home for the first 2 to 3 days.  · Stay away from cigarette smoke - both your smoke and the smoke from others.  · You may use over-the-counter acetaminophen or ibuprofen for fever, muscle aching, and headache, unless another medicine was prescribed for this. If you have chronic liver or kidney disease or ever had a stomach ulcer or GI bleeding, talk with your doctor before using these medicines. No one who is younger than 18 and ill with a fever should take aspirin. It may cause severe disease or death.  · Your appetite may be poor, so a light diet is fine. Avoid dehydration by drinking 8 to 12 8-ounce glasses of fluids each day. This may include water; orange juice; lemonade; apple, grape, and cranberry juice; clear fruit drinks; electrolyte replacement and sports drinks; and decaffeinated teas and coffee. If you have been diagnosed with a kidney disease, ask your doctor how much and what types of fluids you should drink to prevent dehydration. If you have kidney disease, drinking too much fluid can cause it build up in the your body and be dangerous to your " health.  · Over-the-counter remedies won't shorten the length of the illness but may be helpful for cough, sore throat; and nasal and sinus congestion. Don't use decongestants if you have high blood pressure.  Follow-up care  Follow up with your healthcare provider if you do not improve over the next week.  Call 911  Get emergency medical care if any of the following occur:  · Convulsion  · Feeling weak, dizzy, or like you are going to faint  · Chest pain, shortness of breath, wheezing, or difficulty breathing  When to seek medical advice  Call your healthcare provider right away if any of these occur:  · Cough with lots of colored sputum (mucus) or blood in your sputum  · Chest pain, shortness of breath, wheezing, or difficulty breathing  · Severe headache; face, neck, or ear pain  · Severe, constant pain in the lower right side of your belly (abdominal)  · Continued vomiting (cant keep liquids down)  · Frequent diarrhea (more than 5 times a day); blood (red or black color) or mucus in diarrhea  · Feeling weak, dizzy, or like you are going to faint  · Extreme thirst  · Fever of 100.4°F (38°C) or higher, or as directed by your healthcare provider  Date Last Reviewed: 9/25/2015  © 7134-2104 [employer] [address] [place] [address], [place] [address]. All rights reserved. This information is not intended as a substitute for professional medical care. Always follow your healthcare professional's instructions.

## 2020-08-05 ENCOUNTER — NURSE TRIAGE (OUTPATIENT)
Dept: ADMINISTRATIVE | Facility: CLINIC | Age: 40
End: 2020-08-05

## 2020-08-05 NOTE — TELEPHONE ENCOUNTER
Called pt to explain high priority message sent to PCP's office. Also gave pt info. [facility] and pt will schedule virtual appt through the [facility]. Pt comfortable without SOB at this time.

## 2020-08-05 NOTE — TELEPHONE ENCOUNTER
Pt called in response to text on covid symptom tracker. Pt tested yesterday and still waiting on results. States has cough and SOB when laying down. Only can get comfortable sitting up. Temp 100.8, Pt taking Tylenol for fever and prescribed cough syrup. States cough syrup makes her sleepy but does not help cough. Has had diarrhea yesterday. States drinking and urinating as usual but no appetite. Pt would like a virtual visit with PCP; attempting to schedule. Unable to schedule. Called PCP office, unable to reach. Central scheduling also tried for virtual visit.  Sending high priority message to PCP. Will give pt [facility] info. Pt verbalized understanding.     Reason for Disposition   [1] COVID-19 infection suspected by caller or triager AND [2] mild symptoms (cough, fever, or others) AND [3] no complications or SOB    Additional Information   Negative: Difficult to awaken or acting confused (e.g., disoriented, slurred speech)   Negative: SEVERE difficulty breathing (e.g., struggling for each breath, speaks in single words)   Negative: Bluish (or gray) lips or face now   Negative: Shock suspected (e.g., cold/pale/clammy skin, too weak to stand, low BP, rapid pulse)   Negative: Sounds like a life-threatening emergency to the triager   Negative: [1] COVID-19 exposure AND [2] NO symptoms   Negative: COVID-19 and Breastfeeding, questions about   Negative: [1] Adult with possible COVID-19 symptoms AND [2] triager concerned about severity of symptoms or other causes   Negative: SEVERE or constant chest pain or pressure (Exception: mild central chest pain, present only when coughing)   Negative: MODERATE difficulty breathing (e.g., speaks in phrases, SOB even at rest, pulse 100-120)   Negative: MILD difficulty breathing (e.g., minimal/no SOB at rest, SOB with walking, pulse <100)   Negative: Chest pain   Negative: Patient sounds very sick or weak to the triager   Negative: Fever > 103 F (39.4  C)   Negative: [1] Fever > 101 F (38.3 C) AND [2] age > 60   Negative: [1] Fever > 100.0 F (37.8 C) AND [2] bedridden (e.g., nursing home patient, CVA, chronic illness, recovering from surgery)   Negative: HIGH RISK patient (e.g., age > 64 years, diabetes, heart or lung disease, weak immune system)    Protocols used: CORONAVIRUS (COVID-19) DIAGNOSED OR [id number]

## 2020-08-06 ENCOUNTER — OFFICE VISIT (OUTPATIENT)
Dept: INTERNAL MEDICINE | Facility: CLINIC | Age: 40
End: 2020-08-06
Payer: MEDICAID

## 2020-08-06 ENCOUNTER — TELEPHONE (OUTPATIENT)
Dept: URGENT CARE | Facility: CLINIC | Age: 40
End: 2020-08-06

## 2020-08-06 ENCOUNTER — TELEPHONE (OUTPATIENT)
Dept: INTERNAL MEDICINE | Facility: CLINIC | Age: 40
End: 2020-08-06

## 2020-08-06 DIAGNOSIS — J18.9 PNEUMONIA OF BOTH LOWER LOBES DUE TO INFECTIOUS ORGANISM: Primary | ICD-10-CM

## 2020-08-06 LAB — SARS-COV-2 RNA RESP QL NAA+PROBE: NOT DETECTED

## 2020-08-06 PROCEDURE — 99214 PR OFFICE/OUTPT VISIT, EST, LEVL IV, 30-39 MIN: ICD-10-PCS | Mod: 95,,, | Performed by: FAMILY MEDICINE

## 2020-08-06 PROCEDURE — 99214 OFFICE O/P EST MOD 30 MIN: CPT | Mod: 95,,, | Performed by: FAMILY MEDICINE

## 2020-08-06 RX ORDER — AZITHROMYCIN 250 MG/1
TABLET, FILM COATED ORAL
Qty: 6 TABLET | Refills: 0 | Status: SHIPPED | OUTPATIENT
Start: 2020-08-06 | End: 2020-08-11

## 2020-08-06 RX ORDER — PROMETHAZINE HYDROCHLORIDE AND CODEINE PHOSPHATE 6.25; 1 MG/5ML; MG/5ML
5 SOLUTION ORAL EVERY 6 HOURS PRN
Qty: 118 ML | Refills: 0 | Status: SHIPPED | OUTPATIENT
Start: 2020-08-06 | End: 2020-08-13

## 2020-08-06 NOTE — TELEPHONE ENCOUNTER
----- Message from Nery Ball, Patient Care Assistant sent at 8/6/2020  1:34 PM CDT -----  Name of Who is Calling: LOVE BECK [[id number]]    What is the request in detail: Requesting a follow up appointment, patient stating she has fever, chills and body aches. Please contact to further discuss and advise      Can the clinic reply by [facility]: No    [facility]   [phone]

## 2020-08-06 NOTE — PROGRESS NOTES
Patient ID: Foc Watts is a 39 y.o. female.    The patient location is: [place]  The chief complaint leading to consultation is: SOB, cough    Visit type: audiovisual    Face to Face time with patient: 10 minutes  15 minutes of total time spent on the encounter, which includes face to face time and non-face to face time preparing to see the patient (eg, review of tests), Obtaining and/or reviewing separately obtained history, Documenting clinical information in the electronic or other health record, Independently interpreting results (not separately reported) and communicating results to the patient/family/caregiver, or Care coordination (not separately reported).       Each patient to whom he or she provides medical services by telemedicine is:  (1) informed of the relationship between the physician and patient and the respective role of any other health care provider with respect to management of the patient; and (2) notified that he or she may decline to receive medical services by telemedicine and may withdraw from such care at any time.    Chief Complaint: Shortness of Breath and Cough    HPI Patient is 39-year-old female seen via virtual visit with complaints of shortness of breath and cough that began a few days ago.  Her  and child both tested positive for COVID-19.  She was tested twice, most recently 3 days ago. Both results were negative.  She presented to [facility] last night with temperature 103.5°F, coughing spells (non productive) and shortness of breath.  Says she was told her chest x-ray looked okay.  In care everywhere I could not see imaging however I did review report and impression was bilateral pulmonary infiltrates.  Says she was prescribed promethazine dextromethorphan which did not help with cough.  Was also given Tessalon Perles which did not ease her cough. Promethazine codeine did provide some relief.  She did not check her temperature today. Symptoms are same. No  worsening of symptoms.    Family History   Problem Relation Age of Onset    Colon cancer Father     Cancer Father     Hypertension Mother     Breast cancer Neg Hx     Ovarian cancer Neg Hx        Current Outpatient Medications:     adapalene-benzoyl peroxide (EPIDUO FORTE) 0.3-2.5 % GlwP, Apply pea sized amount to face daily. Start off a few nights weekly and increase to nightly as tolerated., Disp: , Rfl:     azithromycin (Z-KAITLIN) 250 MG tablet, Take 2 tablets by mouth on day 1; Take 1 tablet by mouth on days 2-5, Disp: 6 tablet, Rfl: 0    clindamycin (CLEOCIN) 100 mg vaginal suppository, Place 1 suppository (100 mg total) vaginally every evening for 3 days, THEN 1 suppository (100 mg total) twice a week., Disp: 25 suppository, Rfl: 0    dextroamphetamine-amphetamine (ADDERALL XR) 10 MG 24 hr capsule, Take 1 capsule (10 mg total) by mouth every morning., Disp: 30 capsule, Rfl: 0    norgestimate-ethinyl estradiol (ORTHO TRI-CYCLEN,TRI-SPRINTEC) 0.18/0.215/0.25 mg-35 mcg (28) tablet, Take 1 tablet by mouth., Disp: , Rfl:     ondansetron (ZOFRAN) 4 MG tablet, Take 1 tablet (4 mg total) by mouth every 8 (eight) hours as needed for Nausea., Disp: 30 tablet, Rfl: 0    promethazine-codeine 6.25-10 mg/5 ml (PHENERGAN WITH CODEINE) 6.25-10 mg/5 mL syrup, Take 5 mLs by mouth every 6 (six) hours as needed for Cough., Disp: 118 mL, Rfl: 0    promethazine-dextromethorphan (PROMETHAZINE-DM) 6.25-15 mg/5 mL Syrp, Take 5 mLs by mouth every 6 (six) hours as needed (cough)., Disp: 118 mL, Rfl: 0    spironolactone (ALDACTONE) 100 MG tablet, Take 100 mg by mouth., Disp: , Rfl:     Review of Systems   Constitutional: Positive for chills and fever.   HENT: Negative for ear pain, postnasal drip, rhinorrhea and sore throat.    Respiratory: Positive for cough and shortness of breath. Negative for wheezing.    Cardiovascular: Negative for chest pain.   Musculoskeletal: Negative for myalgias.   Skin: Negative for rash.    Allergic/Immunologic: Negative for environmental allergies.   Neurological: Negative for headaches.       Objective:   LMP  (LMP Unknown) Comment: Partial Hysterectomy      Physical Exam  Constitutional:       General: She is not in acute distress.     Appearance: She is well-developed. She is not diaphoretic.   Pulmonary:      Effort: Pulmonary effort is normal. No respiratory distress.   Neurological:      Mental Status: She is alert and oriented to person, place, and time.   Psychiatric:         Behavior: Behavior normal.         Thought Content: Thought content normal.         Judgment: Judgment normal.         Assessment & Plan     Problem List Items Addressed This Visit        Pulmonary    Pneumonia of both lower lobes due to infectious organism - Primary    Current Assessment & Plan     -due to patient's symptoms, cxr (b/l infiltrates) and both her  and child testing positive for COVID 19 I would suspect that patient is covid 19 positive however she has had 2 recent negative COVID-19 test.  I recommended that she self-quarantine as if she is COVID-19 positive.  I will also prescribe azithromycin which has some anti-inflammatory affects and can treat any underlying superimposed bacterial infection  -promethazine codeine also prescribed she reports no relief with Tessalon Perles or promethazine dextromethorphan         Relevant Medications    azithromycin (Z-KAITLIN) 250 MG tablet    promethazine-codeine 6.25-10 mg/5 ml (PHENERGAN WITH CODEINE) 6.25-10 mg/5 mL syrup            Follow up if symptoms worsen or fail to improve.      Disclaimer:  This note may have been prepared using voice recognition software, without extensive proofreading. As such, there could be errors within the text such as sound alike errors.

## 2020-08-06 NOTE — ASSESSMENT & PLAN NOTE
-due to patient's symptoms, cxr (b/l infiltrates) and both her  and child testing positive for COVID 19 I would suspect that patient is covid 19 positive however she has had 2 recent negative COVID-19 test.  I recommended that she self-quarantine as if she is COVID-19 positive.  I will also prescribe azithromycin which has some anti-inflammatory affects and can treat any underlying superimposed bacterial infection  -promethazine codeine also prescribed she reports no relief with Tessalon Perles or promethazine dextromethorphan

## 2020-08-13 ENCOUNTER — OFFICE VISIT (OUTPATIENT)
Dept: INTERNAL MEDICINE | Facility: CLINIC | Age: 40
End: 2020-08-13
Payer: MEDICAID

## 2020-08-13 DIAGNOSIS — R05.9 COUGH: ICD-10-CM

## 2020-08-13 DIAGNOSIS — J18.9 PNEUMONIA OF BOTH LOWER LOBES DUE TO INFECTIOUS ORGANISM: ICD-10-CM

## 2020-08-13 PROCEDURE — 99213 OFFICE O/P EST LOW 20 MIN: CPT | Mod: 95,,, | Performed by: FAMILY MEDICINE

## 2020-08-13 PROCEDURE — 99213 PR OFFICE/OUTPT VISIT, EST, LEVL III, 20-29 MIN: ICD-10-PCS | Mod: 95,,, | Performed by: FAMILY MEDICINE

## 2020-08-13 RX ORDER — PROMETHAZINE HYDROCHLORIDE AND DEXTROMETHORPHAN HYDROBROMIDE 6.25; 15 MG/5ML; MG/5ML
5 SYRUP ORAL EVERY 6 HOURS PRN
Qty: 118 ML | Refills: 0 | Status: SHIPPED | OUTPATIENT
Start: 2020-08-13 | End: 2021-01-29

## 2020-08-13 NOTE — PROGRESS NOTES
Patient ID: Fco Watts is a 39 y.o. female.    The patient location is: [place]  The chief complaint leading to consultation is: headaches, cough    Visit type: audiovisual    Face to Face time with patient: 10 minutes  13 minutes of total time spent on the encounter, which includes face to face time and non-face to face time preparing to see the patient (eg, review of tests), Obtaining and/or reviewing separately obtained history, Documenting clinical information in the electronic or other health record, Independently interpreting results (not separately reported) and communicating results to the patient/family/caregiver, or Care coordination (not separately reported).     Each patient to whom he or she provides medical services by telemedicine is:  (1) informed of the relationship between the physician and patient and the respective role of any other health care provider with respect to management of the patient; and (2) notified that he or she may decline to receive medical services by telemedicine and may withdraw from such care at any time.      Chief Complaint: Cough and Headache    HPI Patient is a 39-year-old female seen via virtual visit with complaint of cough and headache.  She was seen last week and prescribed Z-Eddie.  At last visit we discussed the fact that her daughter and  both tested positive for COVID-19  Patient tested negative for covid twice however her symptoms such as shortness of breath, headache, cough, fever and covid exposure coincide with COVID-19.  Chest x-ray at [facility] also showed bilateral pneumonia.  She completed antibiotics.  Says that her shortness of breath and cough are both improving.  However she does have a throbbing headache usually relieved with Tylenol.  No associated nausea, vomiting, photophobia phonophobia.    Family History   Problem Relation Age of Onset    Colon cancer Father     Cancer Father     Hypertension Mother     Breast cancer Neg Hx      Ovarian cancer Neg Hx        Current Outpatient Medications:     adapalene-benzoyl peroxide (EPIDUO FORTE) 0.3-2.5 % GlwP, Apply pea sized amount to face daily. Start off a few nights weekly and increase to nightly as tolerated., Disp: , Rfl:     clindamycin (CLEOCIN) 100 mg vaginal suppository, Place 1 suppository (100 mg total) vaginally every evening for 3 days, THEN 1 suppository (100 mg total) twice a week., Disp: 27 suppository, Rfl: 0    dextroamphetamine-amphetamine (ADDERALL XR) 10 MG 24 hr capsule, Take 1 capsule (10 mg total) by mouth every morning., Disp: 30 capsule, Rfl: 0    norgestimate-ethinyl estradiol (ORTHO TRI-CYCLEN,TRI-SPRINTEC) 0.18/0.215/0.25 mg-35 mcg (28) tablet, Take 1 tablet by mouth., Disp: , Rfl:     ondansetron (ZOFRAN) 4 MG tablet, Take 1 tablet (4 mg total) by mouth every 8 (eight) hours as needed for Nausea., Disp: 30 tablet, Rfl: 0    promethazine-codeine 6.25-10 mg/5 ml (PHENERGAN WITH CODEINE) 6.25-10 mg/5 mL syrup, Take 5 mLs by mouth every 6 (six) hours as needed for Cough., Disp: 118 mL, Rfl: 0    promethazine-dextromethorphan (PROMETHAZINE-DM) 6.25-15 mg/5 mL Syrp, Take 5 mLs by mouth every 6 (six) hours as needed (cough)., Disp: 118 mL, Rfl: 0    spironolactone (ALDACTONE) 100 MG tablet, Take 100 mg by mouth., Disp: , Rfl:     Review of Systems   Constitutional: Negative for chills and fever.   HENT: Negative for ear pain, postnasal drip, rhinorrhea and sore throat.    Respiratory: Positive for cough and shortness of breath. Negative for wheezing.    Cardiovascular: Negative for chest pain.   Musculoskeletal: Negative for myalgias.   Skin: Negative for rash.   Allergic/Immunologic: Negative for environmental allergies.   Neurological: Positive for headaches.       Objective:   LMP  (LMP Unknown) Comment: Partial Hysterectomy      Physical Exam  Constitutional:       General: She is not in acute distress.     Appearance: She is well-developed. She is not diaphoretic.    Pulmonary:      Effort: Pulmonary effort is normal. No respiratory distress.      Comments: Speaking in full clear sentences. No distress  Neurological:      Mental Status: She is alert and oriented to person, place, and time.   Psychiatric:         Behavior: Behavior normal.         Thought Content: Thought content normal.         Judgment: Judgment normal.         Assessment & Plan     Problem List Items Addressed This Visit        Pulmonary    Pneumonia of both lower lobes due to infectious organism    Current Assessment & Plan     -she completed antibiotics and does report that cough and shortness of breath are improving.  Did advise her to let us know if symptoms worsen or do not continue to improve           Other Visit Diagnoses     Cough        Relevant Medications    promethazine-dextromethorphan (PROMETHAZINE-DM) 6.25-15 mg/5 mL Syrp            Follow up if symptoms worsen or fail to improve.      Disclaimer:  This note may have been prepared using voice recognition software, without extensive proofreading. As such, there could be errors within the text such as sound alike errors.

## 2020-08-13 NOTE — ASSESSMENT & PLAN NOTE
-she completed antibiotics and does report that cough and shortness of breath are improving.  Did advise her to let us know if symptoms worsen or do not continue to improve

## 2020-08-14 ENCOUNTER — TELEPHONE (OUTPATIENT)
Dept: PHARMACY | Facility: CLINIC | Age: 40
End: 2020-08-14

## 2020-08-14 ENCOUNTER — TELEPHONE (OUTPATIENT)
Dept: OBSTETRICS AND GYNECOLOGY | Facility: CLINIC | Age: 40
End: 2020-08-14

## 2020-08-14 DIAGNOSIS — N76.0 BACTERIAL VAGINOSIS: Primary | ICD-10-CM

## 2020-08-14 DIAGNOSIS — B96.89 BACTERIAL VAGINOSIS: Primary | ICD-10-CM

## 2020-08-14 RX ORDER — METRONIDAZOLE 7.5 MG/G
GEL VAGINAL
Qty: 70 G | Refills: 3 | Status: SHIPPED | OUTPATIENT
Start: 2020-08-14 | End: 2020-11-17

## 2020-08-14 NOTE — TELEPHONE ENCOUNTER
The prior authorization for Fco S Stefanie's Cleocin Vaginal Suppository has been submitted on 8/14/2020 @ 10:28am.  It can take up to 72 hours for a decision to be rendered from the insurance.  Patient is aware of PA and process.  Please let me know if you have any questions.    Thank You!   Judy Payne CPhT, B.A  Patient Care Advocate   [facility]  Phone: [phone] Ext [phone]  Fax: [phone]

## 2020-08-14 NOTE — TELEPHONE ENCOUNTER
Good Afternoon,     The prior authorization for Fco Watts's 8/14/2020 @ 2:45pm prescription has been DENIED for the following reason(s): must try and fail Clindesse or Vandazole first.       Patient has been notified of the decision on 8/14/2020 and patient has requested new medication be called into [facility] b/c [facility] in [place] has been giving her too many issues.    Generic Vandazole called into [facility] for patient and they have medication in stock.    If there are any additional questions or concerns, please contact me.    Thank You!   Judy Payne CPhT, B.A  Patient Care Advocate   [facility]  Phone: [phone] Ext [phone]  Fax: [phone]

## 2020-09-01 ENCOUNTER — TELEPHONE (OUTPATIENT)
Dept: INTERNAL MEDICINE | Facility: CLINIC | Age: 40
End: 2020-09-01

## 2020-11-30 ENCOUNTER — TELEPHONE (OUTPATIENT)
Dept: OBSTETRICS AND GYNECOLOGY | Facility: CLINIC | Age: 40
End: 2020-11-30

## 2020-11-30 NOTE — TELEPHONE ENCOUNTER
Called patient and offered appointment today.  She was unable to make it at any other location besides [facility].  She will call back if an appointment is still needed.

## 2020-11-30 NOTE — TELEPHONE ENCOUNTER
----- Message from Rina Medrano sent at 11/30/2020  7:37 AM CST -----  Regarding: Appt  Contact: [phone]  Would like to consult with the nurse, patient would like to schedule an Appt  possible Yeast Infection, please call back thanks sj

## 2020-12-04 DIAGNOSIS — Z01.84 ANTIBODY RESPONSE EXAMINATION: ICD-10-CM

## 2021-01-14 ENCOUNTER — IMMUNIZATION (OUTPATIENT)
Dept: INTERNAL MEDICINE | Facility: CLINIC | Age: 41
End: 2021-01-14
Payer: MEDICAID

## 2021-01-14 PROCEDURE — 90686 IIV4 VACC NO PRSV 0.5 ML IM: CPT | Mod: PBBFAC,PN

## 2021-01-20 ENCOUNTER — TELEPHONE (OUTPATIENT)
Dept: INTERNAL MEDICINE | Facility: CLINIC | Age: 41
End: 2021-01-20

## 2021-01-22 ENCOUNTER — TELEPHONE (OUTPATIENT)
Dept: INTERNAL MEDICINE | Facility: CLINIC | Age: 41
End: 2021-01-22

## 2021-01-29 ENCOUNTER — OFFICE VISIT (OUTPATIENT)
Dept: INTERNAL MEDICINE | Facility: CLINIC | Age: 41
End: 2021-01-29
Payer: MEDICAID

## 2021-01-29 VITALS
TEMPERATURE: 98 F | SYSTOLIC BLOOD PRESSURE: 132 MMHG | DIASTOLIC BLOOD PRESSURE: 78 MMHG | HEIGHT: 62 IN | BODY MASS INDEX: 32.66 KG/M2 | OXYGEN SATURATION: 99 % | WEIGHT: 177.5 LBS | HEART RATE: 69 BPM

## 2021-01-29 DIAGNOSIS — R30.0 DYSURIA: ICD-10-CM

## 2021-01-29 DIAGNOSIS — K59.04 CHRONIC IDIOPATHIC CONSTIPATION: Primary | ICD-10-CM

## 2021-01-29 DIAGNOSIS — R05.9 COUGH: ICD-10-CM

## 2021-01-29 LAB
BILIRUB SERPL-MCNC: NEGATIVE MG/DL
BLOOD URINE, POC: NEGATIVE
CLARITY, POC UA: CLEAR
COLOR, POC UA: YELLOW
GLUCOSE UR QL STRIP: NORMAL
KETONES UR QL STRIP: NEGATIVE
LEUKOCYTE ESTERASE URINE, POC: NEGATIVE
NITRITE, POC UA: NEGATIVE
PH, POC UA: 5
PROTEIN, POC: NEGATIVE
SPECIFIC GRAVITY, POC UA: 1.01
UROBILINOGEN, POC UA: NORMAL

## 2021-01-29 PROCEDURE — 99214 PR OFFICE/OUTPT VISIT, EST, LEVL IV, 30-39 MIN: ICD-10-PCS | Mod: S$PBB,,, | Performed by: FAMILY MEDICINE

## 2021-01-29 PROCEDURE — 81002 URINALYSIS NONAUTO W/O SCOPE: CPT | Mod: PBBFAC,PN | Performed by: FAMILY MEDICINE

## 2021-01-29 PROCEDURE — 99999 PR PBB SHADOW E&M-EST. PATIENT-LVL III: ICD-10-PCS | Mod: PBBFAC,,, | Performed by: FAMILY MEDICINE

## 2021-01-29 PROCEDURE — 99214 OFFICE O/P EST MOD 30 MIN: CPT | Mod: S$PBB,,, | Performed by: FAMILY MEDICINE

## 2021-01-29 PROCEDURE — 99213 OFFICE O/P EST LOW 20 MIN: CPT | Mod: PBBFAC,PN | Performed by: FAMILY MEDICINE

## 2021-01-29 PROCEDURE — 87086 URINE CULTURE/COLONY COUNT: CPT

## 2021-01-29 PROCEDURE — 99999 PR PBB SHADOW E&M-EST. PATIENT-LVL III: CPT | Mod: PBBFAC,,, | Performed by: FAMILY MEDICINE

## 2021-01-29 RX ORDER — FLUCONAZOLE 150 MG/1
TABLET ORAL
Qty: 2 TABLET | Refills: 0 | Status: SHIPPED | OUTPATIENT
Start: 2021-01-29 | End: 2021-06-08

## 2021-01-29 RX ORDER — AMOXICILLIN AND CLAVULANATE POTASSIUM 875; 125 MG/1; MG/1
1 TABLET, FILM COATED ORAL EVERY 12 HOURS
Qty: 20 TABLET | Refills: 0 | Status: SHIPPED | OUTPATIENT
Start: 2021-01-29 | End: 2021-06-08

## 2021-01-29 RX ORDER — FLUTICASONE PROPIONATE 50 MCG
1 SPRAY, SUSPENSION (ML) NASAL DAILY
Qty: 10 G | Refills: 1 | Status: SHIPPED | OUTPATIENT
Start: 2021-01-29 | End: 2021-06-08

## 2021-01-29 RX ORDER — ISOTRETINOIN 40 MG/1
80 CAPSULE ORAL DAILY
COMMUNITY
End: 2022-03-28

## 2021-01-31 LAB — BACTERIA UR CULT: NORMAL

## 2021-03-11 ENCOUNTER — TELEPHONE (OUTPATIENT)
Dept: INTERNAL MEDICINE | Facility: CLINIC | Age: 41
End: 2021-03-11

## 2021-03-12 ENCOUNTER — TELEPHONE (OUTPATIENT)
Dept: INTERNAL MEDICINE | Facility: CLINIC | Age: 41
End: 2021-03-12

## 2021-03-12 ENCOUNTER — PATIENT MESSAGE (OUTPATIENT)
Dept: INTERNAL MEDICINE | Facility: CLINIC | Age: 41
End: 2021-03-12

## 2021-03-31 ENCOUNTER — PATIENT MESSAGE (OUTPATIENT)
Dept: PEDIATRICS | Facility: CLINIC | Age: 41
End: 2021-03-31

## 2021-04-06 ENCOUNTER — TELEPHONE (OUTPATIENT)
Dept: INTERNAL MEDICINE | Facility: CLINIC | Age: 41
End: 2021-04-06

## 2021-04-06 ENCOUNTER — PATIENT MESSAGE (OUTPATIENT)
Dept: GASTROENTEROLOGY | Facility: CLINIC | Age: 41
End: 2021-04-06

## 2021-04-06 ENCOUNTER — OFFICE VISIT (OUTPATIENT)
Dept: INTERNAL MEDICINE | Facility: CLINIC | Age: 41
End: 2021-04-06
Payer: MEDICAID

## 2021-04-06 VITALS
RESPIRATION RATE: 16 BRPM | TEMPERATURE: 98 F | DIASTOLIC BLOOD PRESSURE: 80 MMHG | SYSTOLIC BLOOD PRESSURE: 110 MMHG | OXYGEN SATURATION: 99 % | BODY MASS INDEX: 32.4 KG/M2 | WEIGHT: 177.13 LBS | HEART RATE: 75 BPM

## 2021-04-06 DIAGNOSIS — K58.1 IRRITABLE BOWEL SYNDROME WITH CONSTIPATION: ICD-10-CM

## 2021-04-06 DIAGNOSIS — F43.0 ANXIETY IN ACUTE STRESS REACTION: Primary | ICD-10-CM

## 2021-04-06 DIAGNOSIS — F41.1 ANXIETY IN ACUTE STRESS REACTION: Primary | ICD-10-CM

## 2021-04-06 PROCEDURE — 99214 PR OFFICE/OUTPT VISIT, EST, LEVL IV, 30-39 MIN: ICD-10-PCS | Mod: S$PBB,,, | Performed by: FAMILY MEDICINE

## 2021-04-06 PROCEDURE — 99214 OFFICE O/P EST MOD 30 MIN: CPT | Mod: S$PBB,,, | Performed by: FAMILY MEDICINE

## 2021-04-06 PROCEDURE — 99215 OFFICE O/P EST HI 40 MIN: CPT | Mod: PBBFAC,PN | Performed by: FAMILY MEDICINE

## 2021-04-06 PROCEDURE — 99999 PR PBB SHADOW E&M-EST. PATIENT-LVL V: CPT | Mod: PBBFAC,,, | Performed by: FAMILY MEDICINE

## 2021-04-06 PROCEDURE — 99999 PR PBB SHADOW E&M-EST. PATIENT-LVL V: ICD-10-PCS | Mod: PBBFAC,,, | Performed by: FAMILY MEDICINE

## 2021-04-06 RX ORDER — L.ACIDOPHIL/L.PLANTAR/BIFIDO 7 15B CELL
1 CAPSULE ORAL DAILY
Qty: 30 CAPSULE | Refills: 3 | Status: SHIPPED | OUTPATIENT
Start: 2021-04-06 | End: 2021-06-08

## 2021-04-06 RX ORDER — ALUMINUM ZIRCONIUM TETRACHLOROHYDREX GLY 0.18 G/G
1 STICK TOPICAL
Qty: 400 G | Refills: 11 | Status: SHIPPED | OUTPATIENT
Start: 2021-04-06 | End: 2022-03-28

## 2021-04-08 ENCOUNTER — OFFICE VISIT (OUTPATIENT)
Dept: PRIMARY CARE CLINIC | Facility: CLINIC | Age: 41
End: 2021-04-08
Payer: MEDICAID

## 2021-04-08 ENCOUNTER — LAB VISIT (OUTPATIENT)
Dept: LAB | Facility: HOSPITAL | Age: 41
End: 2021-04-08
Attending: NURSE PRACTITIONER
Payer: MEDICAID

## 2021-04-08 ENCOUNTER — TELEPHONE (OUTPATIENT)
Dept: FAMILY MEDICINE | Facility: CLINIC | Age: 41
End: 2021-04-08

## 2021-04-08 VITALS
TEMPERATURE: 99 F | HEART RATE: 75 BPM | BODY MASS INDEX: 32.57 KG/M2 | HEIGHT: 62 IN | DIASTOLIC BLOOD PRESSURE: 76 MMHG | OXYGEN SATURATION: 99 % | WEIGHT: 177 LBS | SYSTOLIC BLOOD PRESSURE: 118 MMHG

## 2021-04-08 DIAGNOSIS — E55.9 VITAMIN D DEFICIENCY: ICD-10-CM

## 2021-04-08 DIAGNOSIS — R53.83 FATIGUE, UNSPECIFIED TYPE: ICD-10-CM

## 2021-04-08 DIAGNOSIS — F90.9 ATTENTION DEFICIT HYPERACTIVITY DISORDER (ADHD), UNSPECIFIED ADHD TYPE: Primary | ICD-10-CM

## 2021-04-08 DIAGNOSIS — R41.840 CONCENTRATION DEFICIT: ICD-10-CM

## 2021-04-08 PROCEDURE — 82652 VIT D 1 25-DIHYDROXY: CPT | Performed by: NURSE PRACTITIONER

## 2021-04-08 PROCEDURE — 36415 COLL VENOUS BLD VENIPUNCTURE: CPT | Performed by: NURSE PRACTITIONER

## 2021-04-08 PROCEDURE — 84443 ASSAY THYROID STIM HORMONE: CPT | Performed by: NURSE PRACTITIONER

## 2021-04-08 PROCEDURE — 85025 COMPLETE CBC W/AUTO DIFF WBC: CPT | Performed by: NURSE PRACTITIONER

## 2021-04-08 PROCEDURE — 99214 OFFICE O/P EST MOD 30 MIN: CPT | Mod: S$PBB,,, | Performed by: NURSE PRACTITIONER

## 2021-04-08 PROCEDURE — 99214 PR OFFICE/OUTPT VISIT, EST, LEVL IV, 30-39 MIN: ICD-10-PCS | Mod: S$PBB,,, | Performed by: NURSE PRACTITIONER

## 2021-04-08 RX ORDER — DEXTROAMPHETAMINE SACCHARATE, AMPHETAMINE ASPARTATE MONOHYDRATE, DEXTROAMPHETAMINE SULFATE AND AMPHETAMINE SULFATE 2.5; 2.5; 2.5; 2.5 MG/1; MG/1; MG/1; MG/1
10 CAPSULE, EXTENDED RELEASE ORAL EVERY MORNING
Qty: 30 CAPSULE | Refills: 0 | Status: SHIPPED | OUTPATIENT
Start: 2021-04-08 | End: 2021-04-08

## 2021-04-08 RX ORDER — DEXTROAMPHETAMINE SACCHARATE, AMPHETAMINE ASPARTATE MONOHYDRATE, DEXTROAMPHETAMINE SULFATE AND AMPHETAMINE SULFATE 2.5; 2.5; 2.5; 2.5 MG/1; MG/1; MG/1; MG/1
10 CAPSULE, EXTENDED RELEASE ORAL EVERY MORNING
Qty: 30 CAPSULE | Refills: 0 | Status: SHIPPED | OUTPATIENT
Start: 2021-04-08 | End: 2021-06-08

## 2021-04-09 LAB
BASOPHILS # BLD AUTO: 0.05 K/UL (ref 0–0.2)
BASOPHILS NFR BLD: 0.9 % (ref 0–1.9)
DIFFERENTIAL METHOD: ABNORMAL
EOSINOPHIL # BLD AUTO: 0.1 K/UL (ref 0–0.5)
EOSINOPHIL NFR BLD: 1.4 % (ref 0–8)
ERYTHROCYTE [DISTWIDTH] IN BLOOD BY AUTOMATED COUNT: 13.5 % (ref 11.5–14.5)
HCT VFR BLD AUTO: 41.8 % (ref 37–48.5)
HGB BLD-MCNC: 13.2 G/DL (ref 12–16)
IMM GRANULOCYTES # BLD AUTO: 0.01 K/UL (ref 0–0.04)
IMM GRANULOCYTES NFR BLD AUTO: 0.2 % (ref 0–0.5)
LYMPHOCYTES # BLD AUTO: 2.4 K/UL (ref 1–4.8)
LYMPHOCYTES NFR BLD: 42.5 % (ref 18–48)
MCH RBC QN AUTO: 29.7 PG (ref 27–31)
MCHC RBC AUTO-ENTMCNC: 31.6 G/DL (ref 32–36)
MCV RBC AUTO: 94 FL (ref 82–98)
MONOCYTES # BLD AUTO: 0.3 K/UL (ref 0.3–1)
MONOCYTES NFR BLD: 6.1 % (ref 4–15)
NEUTROPHILS # BLD AUTO: 2.7 K/UL (ref 1.8–7.7)
NEUTROPHILS NFR BLD: 48.9 % (ref 38–73)
NRBC BLD-RTO: 0 /100 WBC
PLATELET # BLD AUTO: 245 K/UL (ref 150–450)
PMV BLD AUTO: 10.9 FL (ref 9.2–12.9)
RBC # BLD AUTO: 4.45 M/UL (ref 4–5.4)
TSH SERPL DL<=0.005 MIU/L-ACNC: 0.99 UIU/ML (ref 0.4–4)
WBC # BLD AUTO: 5.55 K/UL (ref 3.9–12.7)

## 2021-04-12 LAB — 1,25(OH)2D3 SERPL-MCNC: 36 PG/ML (ref 20–79)

## 2021-04-26 ENCOUNTER — OFFICE VISIT (OUTPATIENT)
Dept: PSYCHIATRY | Facility: CLINIC | Age: 41
End: 2021-04-26
Payer: MEDICAID

## 2021-04-26 DIAGNOSIS — F90.0 ATTENTION DEFICIT HYPERACTIVITY DISORDER (ADHD), PREDOMINANTLY INATTENTIVE TYPE: Primary | ICD-10-CM

## 2021-04-26 DIAGNOSIS — F41.1 ANXIETY IN ACUTE STRESS REACTION: ICD-10-CM

## 2021-04-26 DIAGNOSIS — F43.0 ANXIETY IN ACUTE STRESS REACTION: ICD-10-CM

## 2021-04-26 PROCEDURE — 99999 PR PBB SHADOW E&M-EST. PATIENT-LVL I: CPT | Mod: PBBFAC,AJ,HB, | Performed by: SOCIAL WORKER

## 2021-04-26 PROCEDURE — 99211 OFF/OP EST MAY X REQ PHY/QHP: CPT | Mod: PBBFAC | Performed by: SOCIAL WORKER

## 2021-04-26 PROCEDURE — 99999 PR PBB SHADOW E&M-EST. PATIENT-LVL I: ICD-10-PCS | Mod: PBBFAC,AJ,HB, | Performed by: SOCIAL WORKER

## 2021-04-26 PROCEDURE — 90834 PR PSYCHOTHERAPY W/PATIENT, 45 MIN: ICD-10-PCS | Mod: AJ,HB,, | Performed by: SOCIAL WORKER

## 2021-04-26 PROCEDURE — 90834 PSYTX W PT 45 MINUTES: CPT | Mod: AJ,HB,, | Performed by: SOCIAL WORKER

## 2021-05-11 ENCOUNTER — IMMUNIZATION (OUTPATIENT)
Dept: PRIMARY CARE CLINIC | Facility: CLINIC | Age: 41
End: 2021-05-11
Payer: MEDICAID

## 2021-05-11 DIAGNOSIS — Z23 NEED FOR VACCINATION: Primary | ICD-10-CM

## 2021-05-11 PROCEDURE — 91300 COVID-19, MRNA, LNP-S, PF, 30 MCG/0.3 ML DOSE VACCINE: CPT | Mod: S$GLB,,, | Performed by: FAMILY MEDICINE

## 2021-05-11 PROCEDURE — 0001A COVID-19, MRNA, LNP-S, PF, 30 MCG/0.3 ML DOSE VACCINE: ICD-10-PCS | Mod: CV19,S$GLB,, | Performed by: FAMILY MEDICINE

## 2021-05-11 PROCEDURE — 0001A COVID-19, MRNA, LNP-S, PF, 30 MCG/0.3 ML DOSE VACCINE: CPT | Mod: CV19,S$GLB,, | Performed by: FAMILY MEDICINE

## 2021-05-11 PROCEDURE — 91300 COVID-19, MRNA, LNP-S, PF, 30 MCG/0.3 ML DOSE VACCINE: ICD-10-PCS | Mod: S$GLB,,, | Performed by: FAMILY MEDICINE

## 2021-06-01 ENCOUNTER — IMMUNIZATION (OUTPATIENT)
Dept: PRIMARY CARE CLINIC | Facility: CLINIC | Age: 41
End: 2021-06-01

## 2021-06-01 DIAGNOSIS — Z23 NEED FOR VACCINATION: Primary | ICD-10-CM

## 2021-06-01 PROCEDURE — 91300 COVID-19, MRNA, LNP-S, PF, 30 MCG/0.3 ML DOSE VACCINE: CPT | Mod: S$GLB,,, | Performed by: FAMILY MEDICINE

## 2021-06-01 PROCEDURE — 0002A COVID-19, MRNA, LNP-S, PF, 30 MCG/0.3 ML DOSE VACCINE: CPT | Mod: CV19,S$GLB,, | Performed by: FAMILY MEDICINE

## 2021-06-01 PROCEDURE — 91300 COVID-19, MRNA, LNP-S, PF, 30 MCG/0.3 ML DOSE VACCINE: ICD-10-PCS | Mod: S$GLB,,, | Performed by: FAMILY MEDICINE

## 2021-06-01 PROCEDURE — 0002A COVID-19, MRNA, LNP-S, PF, 30 MCG/0.3 ML DOSE VACCINE: ICD-10-PCS | Mod: CV19,S$GLB,, | Performed by: FAMILY MEDICINE

## 2021-06-03 ENCOUNTER — PATIENT MESSAGE (OUTPATIENT)
Dept: GASTROENTEROLOGY | Facility: CLINIC | Age: 41
End: 2021-06-03

## 2021-06-08 ENCOUNTER — OFFICE VISIT (OUTPATIENT)
Dept: PRIMARY CARE CLINIC | Facility: CLINIC | Age: 41
End: 2021-06-08
Payer: MEDICAID

## 2021-06-08 VITALS
SYSTOLIC BLOOD PRESSURE: 111 MMHG | WEIGHT: 178.63 LBS | HEART RATE: 77 BPM | HEIGHT: 62 IN | TEMPERATURE: 98 F | DIASTOLIC BLOOD PRESSURE: 60 MMHG | BODY MASS INDEX: 32.87 KG/M2

## 2021-06-08 DIAGNOSIS — F32.A DEPRESSION, UNSPECIFIED DEPRESSION TYPE: ICD-10-CM

## 2021-06-08 DIAGNOSIS — F41.9 ANXIETY: Primary | ICD-10-CM

## 2021-06-08 PROCEDURE — 99999 PR PBB SHADOW E&M-EST. PATIENT-LVL III: ICD-10-PCS | Mod: PBBFAC,,, | Performed by: NURSE PRACTITIONER

## 2021-06-08 PROCEDURE — 99213 PR OFFICE/OUTPT VISIT, EST, LEVL III, 20-29 MIN: ICD-10-PCS | Mod: S$PBB,,, | Performed by: NURSE PRACTITIONER

## 2021-06-08 PROCEDURE — 99213 OFFICE O/P EST LOW 20 MIN: CPT | Mod: S$PBB,,, | Performed by: NURSE PRACTITIONER

## 2021-06-08 PROCEDURE — 99999 PR PBB SHADOW E&M-EST. PATIENT-LVL III: CPT | Mod: PBBFAC,,, | Performed by: NURSE PRACTITIONER

## 2021-06-08 PROCEDURE — 99213 OFFICE O/P EST LOW 20 MIN: CPT | Mod: PBBFAC,PN | Performed by: NURSE PRACTITIONER

## 2021-06-08 RX ORDER — ESCITALOPRAM OXALATE 10 MG/1
10 TABLET ORAL DAILY
Qty: 30 TABLET | Refills: 11 | Status: SHIPPED | OUTPATIENT
Start: 2021-06-08 | End: 2021-09-09

## 2021-06-08 RX ORDER — BUSPIRONE HYDROCHLORIDE 7.5 MG/1
7.5 TABLET ORAL 3 TIMES DAILY
Qty: 90 TABLET | Refills: 3 | Status: SHIPPED | OUTPATIENT
Start: 2021-06-08 | End: 2022-03-28

## 2021-06-08 RX ORDER — METRONIDAZOLE 500 MG/1
500 TABLET ORAL 2 TIMES DAILY
COMMUNITY
Start: 2021-05-29 | End: 2021-09-09

## 2021-06-08 RX ORDER — DICYCLOMINE HYDROCHLORIDE 10 MG/1
20 CAPSULE ORAL EVERY 8 HOURS PRN
COMMUNITY
Start: 2021-03-11 | End: 2022-03-28

## 2021-06-08 RX ORDER — FINASTERIDE 5 MG/1
TABLET, FILM COATED ORAL
COMMUNITY
Start: 2021-05-18 | End: 2022-03-28

## 2021-09-07 ENCOUNTER — TELEPHONE (OUTPATIENT)
Dept: OBSTETRICS AND GYNECOLOGY | Facility: CLINIC | Age: 41
End: 2021-09-07

## 2021-09-09 ENCOUNTER — TELEPHONE (OUTPATIENT)
Dept: OBSTETRICS AND GYNECOLOGY | Facility: CLINIC | Age: 41
End: 2021-09-09

## 2021-09-09 ENCOUNTER — OFFICE VISIT (OUTPATIENT)
Dept: OBSTETRICS AND GYNECOLOGY | Facility: CLINIC | Age: 41
End: 2021-09-09
Payer: MEDICAID

## 2021-09-09 DIAGNOSIS — N76.0 RECURRENT VAGINITIS: Primary | ICD-10-CM

## 2021-09-09 PROCEDURE — 87481 CANDIDA DNA AMP PROBE: CPT | Mod: 59 | Performed by: NURSE PRACTITIONER

## 2021-09-09 PROCEDURE — 99212 OFFICE O/P EST SF 10 MIN: CPT | Mod: PBBFAC | Performed by: NURSE PRACTITIONER

## 2021-09-09 PROCEDURE — 99999 PR PBB SHADOW E&M-EST. PATIENT-LVL II: CPT | Mod: PBBFAC,,, | Performed by: NURSE PRACTITIONER

## 2021-09-09 PROCEDURE — 99999 PR PBB SHADOW E&M-EST. PATIENT-LVL II: ICD-10-PCS | Mod: PBBFAC,,, | Performed by: NURSE PRACTITIONER

## 2021-09-09 PROCEDURE — 99213 PR OFFICE/OUTPT VISIT, EST, LEVL III, 20-29 MIN: ICD-10-PCS | Mod: S$PBB,,, | Performed by: NURSE PRACTITIONER

## 2021-09-09 PROCEDURE — 99213 OFFICE O/P EST LOW 20 MIN: CPT | Mod: S$PBB,,, | Performed by: NURSE PRACTITIONER

## 2021-09-09 RX ORDER — CLINDAMYCIN PHOSPHATE 100 MG/5G
CREAM VAGINAL
Qty: 10 G | Refills: 6 | Status: SHIPPED | OUTPATIENT
Start: 2021-09-09 | End: 2021-09-21 | Stop reason: SDUPTHER

## 2021-09-09 RX ORDER — FLUCONAZOLE 150 MG/1
TABLET ORAL
Qty: 30 TABLET | Refills: 0 | Status: SHIPPED | OUTPATIENT
Start: 2021-09-09 | End: 2021-10-15

## 2021-09-10 LAB
BACTERIAL VAGINOSIS DNA: POSITIVE
CANDIDA GLABRATA DNA: NEGATIVE
CANDIDA KRUSEI DNA: NEGATIVE
CANDIDA RRNA VAG QL PROBE: NEGATIVE
T VAGINALIS RRNA GENITAL QL PROBE: NEGATIVE

## 2021-09-12 ENCOUNTER — PATIENT MESSAGE (OUTPATIENT)
Dept: OBSTETRICS AND GYNECOLOGY | Facility: CLINIC | Age: 41
End: 2021-09-12

## 2021-10-15 ENCOUNTER — PATIENT MESSAGE (OUTPATIENT)
Dept: OBSTETRICS AND GYNECOLOGY | Facility: CLINIC | Age: 41
End: 2021-10-15

## 2021-10-15 ENCOUNTER — TELEPHONE (OUTPATIENT)
Dept: OBSTETRICS AND GYNECOLOGY | Facility: CLINIC | Age: 41
End: 2021-10-15

## 2021-10-15 DIAGNOSIS — N76.0 RECURRENT VAGINITIS: Primary | ICD-10-CM

## 2021-10-15 RX ORDER — METRONIDAZOLE 500 MG/1
TABLET ORAL
Qty: 4 TABLET | Refills: 5 | Status: SHIPPED | OUTPATIENT
Start: 2021-10-15 | End: 2021-10-18

## 2021-10-15 RX ORDER — FLUCONAZOLE 150 MG/1
TABLET ORAL
Qty: 4 TABLET | Refills: 5 | Status: SHIPPED | OUTPATIENT
Start: 2021-10-15 | End: 2021-11-12 | Stop reason: SDUPTHER

## 2021-10-18 DIAGNOSIS — N76.0 RECURRENT VAGINITIS: Primary | ICD-10-CM

## 2021-10-18 DIAGNOSIS — B96.89 BACTERIAL VAGINOSIS: ICD-10-CM

## 2021-10-18 DIAGNOSIS — N76.0 BACTERIAL VAGINOSIS: ICD-10-CM

## 2021-10-18 RX ORDER — METRONIDAZOLE 500 MG/1
500 TABLET ORAL EVERY 12 HOURS
Qty: 14 TABLET | Refills: 0 | Status: SHIPPED | OUTPATIENT
Start: 2021-10-18 | End: 2021-10-25

## 2021-11-11 ENCOUNTER — PATIENT MESSAGE (OUTPATIENT)
Dept: OBSTETRICS AND GYNECOLOGY | Facility: CLINIC | Age: 41
End: 2021-11-11
Payer: MEDICAID

## 2021-11-12 DIAGNOSIS — N76.0 RECURRENT VAGINITIS: Primary | ICD-10-CM

## 2021-11-12 RX ORDER — METRONIDAZOLE 500 MG/1
TABLET ORAL
Qty: 4 TABLET | Refills: 5 | Status: SHIPPED | OUTPATIENT
Start: 2021-11-12 | End: 2022-04-19

## 2021-11-12 RX ORDER — FLUCONAZOLE 150 MG/1
TABLET ORAL
Qty: 4 TABLET | Refills: 5 | Status: SHIPPED | OUTPATIENT
Start: 2021-11-12 | End: 2022-04-19

## 2021-12-21 ENCOUNTER — TELEPHONE (OUTPATIENT)
Dept: OBSTETRICS AND GYNECOLOGY | Facility: CLINIC | Age: 41
End: 2021-12-21
Payer: MEDICAID

## 2021-12-21 DIAGNOSIS — Z12.31 ENCOUNTER FOR SCREENING MAMMOGRAM FOR MALIGNANT NEOPLASM OF BREAST: Primary | ICD-10-CM

## 2022-01-28 ENCOUNTER — PATIENT MESSAGE (OUTPATIENT)
Dept: OBSTETRICS AND GYNECOLOGY | Facility: CLINIC | Age: 42
End: 2022-01-28
Payer: MEDICAID

## 2022-02-23 ENCOUNTER — HOSPITAL ENCOUNTER (OUTPATIENT)
Dept: RADIOLOGY | Facility: HOSPITAL | Age: 42
Discharge: HOME | End: 2022-02-23
Attending: NURSE PRACTITIONER
Payer: MEDICAID

## 2022-02-23 DIAGNOSIS — Z12.31 ENCOUNTER FOR SCREENING MAMMOGRAM FOR MALIGNANT NEOPLASM OF BREAST: ICD-10-CM

## 2022-02-23 PROCEDURE — 77067 SCR MAMMO BI INCL CAD: CPT | Mod: TC

## 2022-02-23 PROCEDURE — 77063 BREAST TOMOSYNTHESIS BI: CPT | Mod: 26,,, | Performed by: RADIOLOGY

## 2022-02-23 PROCEDURE — 77067 MAMMO DIGITAL SCREENING BILAT WITH TOMO: ICD-10-PCS | Mod: 26,,, | Performed by: RADIOLOGY

## 2022-02-23 PROCEDURE — 77067 SCR MAMMO BI INCL CAD: CPT | Mod: 26,,, | Performed by: RADIOLOGY

## 2022-02-23 PROCEDURE — 77063 MAMMO DIGITAL SCREENING BILAT WITH TOMO: ICD-10-PCS | Mod: 26,,, | Performed by: RADIOLOGY

## 2022-03-28 ENCOUNTER — PATIENT OUTREACH (OUTPATIENT)
Dept: ADMINISTRATIVE | Facility: OTHER | Age: 42
End: 2022-03-28
Payer: MEDICAID

## 2022-03-28 ENCOUNTER — OFFICE VISIT (OUTPATIENT)
Dept: OBSTETRICS AND GYNECOLOGY | Facility: CLINIC | Age: 42
End: 2022-03-28
Payer: MEDICAID

## 2022-03-28 VITALS
DIASTOLIC BLOOD PRESSURE: 82 MMHG | WEIGHT: 187.38 LBS | SYSTOLIC BLOOD PRESSURE: 122 MMHG | BODY MASS INDEX: 34.27 KG/M2

## 2022-03-28 DIAGNOSIS — N89.8 VAGINAL DISCHARGE: Primary | ICD-10-CM

## 2022-03-28 PROCEDURE — 3074F PR MOST RECENT SYSTOLIC BLOOD PRESSURE < 130 MM HG: ICD-10-PCS | Mod: CPTII,,, | Performed by: NURSE PRACTITIONER

## 2022-03-28 PROCEDURE — 1159F PR MEDICATION LIST DOCUMENTED IN MEDICAL RECORD: ICD-10-PCS | Mod: CPTII,,, | Performed by: NURSE PRACTITIONER

## 2022-03-28 PROCEDURE — 3008F BODY MASS INDEX DOCD: CPT | Mod: CPTII,,, | Performed by: NURSE PRACTITIONER

## 2022-03-28 PROCEDURE — 1160F PR REVIEW ALL MEDS BY PRESCRIBER/CLIN PHARMACIST DOCUMENTED: ICD-10-PCS | Mod: CPTII,,, | Performed by: NURSE PRACTITIONER

## 2022-03-28 PROCEDURE — 99999 PR PBB SHADOW E&M-EST. PATIENT-LVL III: ICD-10-PCS | Mod: PBBFAC,,, | Performed by: NURSE PRACTITIONER

## 2022-03-28 PROCEDURE — 99213 PR OFFICE/OUTPT VISIT, EST, LEVL III, 20-29 MIN: ICD-10-PCS | Mod: S$PBB,,, | Performed by: NURSE PRACTITIONER

## 2022-03-28 PROCEDURE — 3079F PR MOST RECENT DIASTOLIC BLOOD PRESSURE 80-89 MM HG: ICD-10-PCS | Mod: CPTII,,, | Performed by: NURSE PRACTITIONER

## 2022-03-28 PROCEDURE — 3079F DIAST BP 80-89 MM HG: CPT | Mod: CPTII,,, | Performed by: NURSE PRACTITIONER

## 2022-03-28 PROCEDURE — 99213 OFFICE O/P EST LOW 20 MIN: CPT | Mod: S$PBB,,, | Performed by: NURSE PRACTITIONER

## 2022-03-28 PROCEDURE — 87070 CULTURE OTHR SPECIMN AEROBIC: CPT | Performed by: NURSE PRACTITIONER

## 2022-03-28 PROCEDURE — 3008F PR BODY MASS INDEX (BMI) DOCUMENTED: ICD-10-PCS | Mod: CPTII,,, | Performed by: NURSE PRACTITIONER

## 2022-03-28 PROCEDURE — 87481 CANDIDA DNA AMP PROBE: CPT | Mod: 59 | Performed by: NURSE PRACTITIONER

## 2022-03-28 PROCEDURE — 1159F MED LIST DOCD IN RCRD: CPT | Mod: CPTII,,, | Performed by: NURSE PRACTITIONER

## 2022-03-28 PROCEDURE — 99213 OFFICE O/P EST LOW 20 MIN: CPT | Mod: PBBFAC | Performed by: NURSE PRACTITIONER

## 2022-03-28 PROCEDURE — 3074F SYST BP LT 130 MM HG: CPT | Mod: CPTII,,, | Performed by: NURSE PRACTITIONER

## 2022-03-28 PROCEDURE — 99999 PR PBB SHADOW E&M-EST. PATIENT-LVL III: CPT | Mod: PBBFAC,,, | Performed by: NURSE PRACTITIONER

## 2022-03-28 PROCEDURE — 1160F RVW MEDS BY RX/DR IN RCRD: CPT | Mod: CPTII,,, | Performed by: NURSE PRACTITIONER

## 2022-03-28 RX ORDER — TRETINOIN 0.25 MG/G
CREAM TOPICAL
COMMUNITY
Start: 2022-01-28 | End: 2023-01-28

## 2022-03-28 RX ORDER — SPIRONOLACTONE 50 MG/1
50 TABLET, FILM COATED ORAL 2 TIMES DAILY
COMMUNITY
Start: 2022-03-08 | End: 2023-09-18

## 2022-03-28 NOTE — PROGRESS NOTES
CC: Vaginal discharge    Fco Watts is a 41 y.o. female  presents with complaint of vaginal discharge for 1 week.  She reports intermittent itching.  reports odor.  She states the discharge is white.      Past Medical History:   Diagnosis Date    Abnormal Pap smear of cervix     ADD (attention deficit disorder) without hyperactivity 2019    Prescribed Adderall 10 1 po qd by her previous PCP, Dr. Dorita Cota, which she takes prn inattention while studying    Attention deficit hyperactivity disorder (ADHD), predominantly inattentive type 5/3/2019    Dyslipidemia     low HDL    Thyroid disease     h/o hypothyroidism    Vitamin D deficiency      Past Surgical History:   Procedure Laterality Date     SECTION  2010    DILATION AND CURETTAGE OF UTERUS      stillbirth    HYSTERECTOMY      Still has ovaries (hx abnl pap); mayda     Social History     Tobacco Use    Smoking status: Never Smoker    Smokeless tobacco: Never Used   Substance Use Topics    Alcohol use: Not Currently     Alcohol/week: 0.0 - 1.0 standard drinks    Drug use: No     Family History   Problem Relation Age of Onset    Breast cancer Mother 70    Hypertension Mother     Colon cancer Father     Cancer Father     Ovarian cancer Neg Hx      OB History    Para Term  AB Living   7 6 5 1 1 6   SAB IAB Ectopic Multiple Live Births   1       6      # Outcome Date GA Lbr Girish/2nd Weight Sex Delivery Anes PTL Lv   7 SAB         FD   6 Term     F CS-LTranv   ANTONIO   5 Term     F Vag-Spont   ANTONIO   4 Term     F Vag-Spont   ANTONIO   3 Term     F Vag-Spont   ANTONIO   2 Term     F Vag-Spont   ANTONIO   1      F Vag-Spont   ANTONIO       /82   Wt 85 kg (187 lb 6.3 oz)   LMP  (LMP Unknown) Comment: Partial Hysterectomy   BMI 34.27 kg/m²     ROS:  GENERAL: No fever, chills, fatigability or weight loss.  PELVIC: See HPI  ABDOMEN: No abdominal pain. Denies nausea. Denies vomiting. No diarrhea. No  constipation  BREAST: Denies pain. No lumps. No discharge.  URINARY: No incontinence, no nocturia, no frequency and no dysuria.  CARDIOVASCULAR: No chest pain. No shortness of breath. No leg cramps.  NEUROLOGICAL: No headaches. No vision changes.    PHYSICAL EXAM:  VULVA: normal appearing vulva with no masses, tenderness or lesions, VAGINA: vaginal discharge - white and thin, CERVIX: surgically absent, UTERUS: surgically absent, vaginal cuff well healed, ADNEXA: normal adnexa in size, nontender and no masses    ASSESSMENT and PLAN:  Diagnoses and all orders for this visit:    Vaginal discharge  -     Vaginosis Screen by DNA Probe  -     Genital Culture    Will follow vaginal culture and treat as indicated.  If positive for BV will consider Metrogel 2 times a week for 4 months.    Patient was counseled today on vaginitis prevention including :  a. avoiding feminine products such as deoderant soaps, body wash, bubble bath, douches, scented toilet paper, deoderant tampons or pads, feminine wipes, chronic pad use, etc.  b. avoiding other vulvovaginal irritants such as long hot baths, humidity, tight, synthetic clothing, chlorine and sitting around in wet bathing suits  c. wearing cotton underwear, avoiding thong underwear and no underwear to bed  d. taking showers instead of baths and use a hair dryer on cool setting afterwards to dry  e. wearing cotton to exercise and shower immediately after exercise and change clothes  f. using polyurethane condoms without spermicide if sexually active and symptoms are triggered by intercourse

## 2022-03-30 LAB
BACTERIAL VAGINOSIS DNA: NEGATIVE
CANDIDA GLABRATA DNA: NEGATIVE
CANDIDA KRUSEI DNA: NEGATIVE
CANDIDA RRNA VAG QL PROBE: NEGATIVE
T VAGINALIS RRNA GENITAL QL PROBE: NEGATIVE

## 2022-04-01 LAB — BACTERIA GENITAL AEROBE CULT: NORMAL

## 2022-04-08 ENCOUNTER — LAB VISIT (OUTPATIENT)
Dept: LAB | Facility: HOSPITAL | Age: 42
End: 2022-04-08
Attending: NURSE PRACTITIONER
Payer: MEDICAID

## 2022-04-08 ENCOUNTER — OFFICE VISIT (OUTPATIENT)
Dept: PRIMARY CARE CLINIC | Facility: CLINIC | Age: 42
End: 2022-04-08
Payer: MEDICAID

## 2022-04-08 VITALS
DIASTOLIC BLOOD PRESSURE: 58 MMHG | HEART RATE: 62 BPM | TEMPERATURE: 99 F | OXYGEN SATURATION: 98 % | BODY MASS INDEX: 35.33 KG/M2 | HEIGHT: 62 IN | SYSTOLIC BLOOD PRESSURE: 108 MMHG | WEIGHT: 192 LBS

## 2022-04-08 DIAGNOSIS — K59.00 CONSTIPATION, UNSPECIFIED CONSTIPATION TYPE: Primary | ICD-10-CM

## 2022-04-08 DIAGNOSIS — Z13.220 ENCOUNTER FOR LIPID SCREENING FOR CARDIOVASCULAR DISEASE: ICD-10-CM

## 2022-04-08 DIAGNOSIS — Z86.39 HISTORY OF METABOLIC AND NUTRITIONAL DISORDER: ICD-10-CM

## 2022-04-08 DIAGNOSIS — Z00.00 GENERAL MEDICAL EXAM: ICD-10-CM

## 2022-04-08 DIAGNOSIS — R53.83 FATIGUE, UNSPECIFIED TYPE: ICD-10-CM

## 2022-04-08 DIAGNOSIS — Z13.6 ENCOUNTER FOR LIPID SCREENING FOR CARDIOVASCULAR DISEASE: ICD-10-CM

## 2022-04-08 DIAGNOSIS — R10.9 ABDOMINAL PAIN, UNSPECIFIED ABDOMINAL LOCATION: ICD-10-CM

## 2022-04-08 LAB
ALBUMIN SERPL BCP-MCNC: 3.4 G/DL (ref 3.5–5.2)
ALP SERPL-CCNC: 60 U/L (ref 55–135)
ALT SERPL W/O P-5'-P-CCNC: 16 U/L (ref 10–44)
ANION GAP SERPL CALC-SCNC: 9 MMOL/L (ref 8–16)
AST SERPL-CCNC: 15 U/L (ref 10–40)
BASOPHILS # BLD AUTO: 0.03 K/UL (ref 0–0.2)
BASOPHILS NFR BLD: 0.5 % (ref 0–1.9)
BILIRUB SERPL-MCNC: 0.2 MG/DL (ref 0.1–1)
BUN SERPL-MCNC: 8 MG/DL (ref 6–20)
CALCIUM SERPL-MCNC: 8.9 MG/DL (ref 8.7–10.5)
CHLORIDE SERPL-SCNC: 107 MMOL/L (ref 95–110)
CHOLEST SERPL-MCNC: 162 MG/DL (ref 120–199)
CHOLEST/HDLC SERPL: 3.2 {RATIO} (ref 2–5)
CO2 SERPL-SCNC: 24 MMOL/L (ref 23–29)
CREAT SERPL-MCNC: 0.7 MG/DL (ref 0.5–1.4)
DIFFERENTIAL METHOD: ABNORMAL
EOSINOPHIL # BLD AUTO: 0.1 K/UL (ref 0–0.5)
EOSINOPHIL NFR BLD: 2 % (ref 0–8)
ERYTHROCYTE [DISTWIDTH] IN BLOOD BY AUTOMATED COUNT: 12.9 % (ref 11.5–14.5)
EST. GFR  (AFRICAN AMERICAN): >60 ML/MIN/1.73 M^2
EST. GFR  (NON AFRICAN AMERICAN): >60 ML/MIN/1.73 M^2
ESTIMATED AVG GLUCOSE: 108 MG/DL (ref 68–131)
GLUCOSE SERPL-MCNC: 89 MG/DL (ref 70–110)
HBA1C MFR BLD: 5.4 % (ref 4–5.6)
HCT VFR BLD AUTO: 36.5 % (ref 37–48.5)
HDLC SERPL-MCNC: 50 MG/DL (ref 40–75)
HDLC SERPL: 30.9 % (ref 20–50)
HGB BLD-MCNC: 11.9 G/DL (ref 12–16)
IMM GRANULOCYTES # BLD AUTO: 0.04 K/UL (ref 0–0.04)
IMM GRANULOCYTES NFR BLD AUTO: 0.6 % (ref 0–0.5)
LDLC SERPL CALC-MCNC: 100.4 MG/DL (ref 63–159)
LYMPHOCYTES # BLD AUTO: 2 K/UL (ref 1–4.8)
LYMPHOCYTES NFR BLD: 32 % (ref 18–48)
MCH RBC QN AUTO: 29.8 PG (ref 27–31)
MCHC RBC AUTO-ENTMCNC: 32.6 G/DL (ref 32–36)
MCV RBC AUTO: 92 FL (ref 82–98)
MONOCYTES # BLD AUTO: 0.5 K/UL (ref 0.3–1)
MONOCYTES NFR BLD: 7.1 % (ref 4–15)
NEUTROPHILS # BLD AUTO: 3.7 K/UL (ref 1.8–7.7)
NEUTROPHILS NFR BLD: 57.8 % (ref 38–73)
NONHDLC SERPL-MCNC: 112 MG/DL
NRBC BLD-RTO: 0 /100 WBC
PLATELET # BLD AUTO: 214 K/UL (ref 150–450)
PMV BLD AUTO: 10.8 FL (ref 9.2–12.9)
POTASSIUM SERPL-SCNC: 4 MMOL/L (ref 3.5–5.1)
PROT SERPL-MCNC: 6.3 G/DL (ref 6–8.4)
RBC # BLD AUTO: 3.99 M/UL (ref 4–5.4)
SODIUM SERPL-SCNC: 140 MMOL/L (ref 136–145)
T4 FREE SERPL-MCNC: 0.76 NG/DL (ref 0.71–1.51)
TRIGL SERPL-MCNC: 58 MG/DL (ref 30–150)
TSH SERPL DL<=0.005 MIU/L-ACNC: 0.96 UIU/ML (ref 0.4–4)
WBC # BLD AUTO: 6.38 K/UL (ref 3.9–12.7)

## 2022-04-08 PROCEDURE — 99999 PR PBB SHADOW E&M-EST. PATIENT-LVL III: CPT | Mod: PBBFAC,,, | Performed by: NURSE PRACTITIONER

## 2022-04-08 PROCEDURE — 83036 HEMOGLOBIN GLYCOSYLATED A1C: CPT | Performed by: NURSE PRACTITIONER

## 2022-04-08 PROCEDURE — 80053 COMPREHEN METABOLIC PANEL: CPT | Performed by: NURSE PRACTITIONER

## 2022-04-08 PROCEDURE — 80061 LIPID PANEL: CPT | Performed by: NURSE PRACTITIONER

## 2022-04-08 PROCEDURE — 99213 PR OFFICE/OUTPT VISIT, EST, LEVL III, 20-29 MIN: ICD-10-PCS | Mod: S$PBB,,, | Performed by: NURSE PRACTITIONER

## 2022-04-08 PROCEDURE — 3074F PR MOST RECENT SYSTOLIC BLOOD PRESSURE < 130 MM HG: ICD-10-PCS | Mod: CPTII,,, | Performed by: NURSE PRACTITIONER

## 2022-04-08 PROCEDURE — 99999 PR PBB SHADOW E&M-EST. PATIENT-LVL III: ICD-10-PCS | Mod: PBBFAC,,, | Performed by: NURSE PRACTITIONER

## 2022-04-08 PROCEDURE — 3078F DIAST BP <80 MM HG: CPT | Mod: CPTII,,, | Performed by: NURSE PRACTITIONER

## 2022-04-08 PROCEDURE — 1159F PR MEDICATION LIST DOCUMENTED IN MEDICAL RECORD: ICD-10-PCS | Mod: CPTII,,, | Performed by: NURSE PRACTITIONER

## 2022-04-08 PROCEDURE — 3008F BODY MASS INDEX DOCD: CPT | Mod: CPTII,,, | Performed by: NURSE PRACTITIONER

## 2022-04-08 PROCEDURE — 3078F PR MOST RECENT DIASTOLIC BLOOD PRESSURE < 80 MM HG: ICD-10-PCS | Mod: CPTII,,, | Performed by: NURSE PRACTITIONER

## 2022-04-08 PROCEDURE — 99213 OFFICE O/P EST LOW 20 MIN: CPT | Mod: S$PBB,,, | Performed by: NURSE PRACTITIONER

## 2022-04-08 PROCEDURE — 3074F SYST BP LT 130 MM HG: CPT | Mod: CPTII,,, | Performed by: NURSE PRACTITIONER

## 2022-04-08 PROCEDURE — 85025 COMPLETE CBC W/AUTO DIFF WBC: CPT | Performed by: NURSE PRACTITIONER

## 2022-04-08 PROCEDURE — 3044F HG A1C LEVEL LT 7.0%: CPT | Mod: CPTII,,, | Performed by: NURSE PRACTITIONER

## 2022-04-08 PROCEDURE — 1159F MED LIST DOCD IN RCRD: CPT | Mod: CPTII,,, | Performed by: NURSE PRACTITIONER

## 2022-04-08 PROCEDURE — 3044F PR MOST RECENT HEMOGLOBIN A1C LEVEL <7.0%: ICD-10-PCS | Mod: CPTII,,, | Performed by: NURSE PRACTITIONER

## 2022-04-08 PROCEDURE — 99213 OFFICE O/P EST LOW 20 MIN: CPT | Mod: PBBFAC,PN | Performed by: NURSE PRACTITIONER

## 2022-04-08 PROCEDURE — 84439 ASSAY OF FREE THYROXINE: CPT | Performed by: NURSE PRACTITIONER

## 2022-04-08 PROCEDURE — 84443 ASSAY THYROID STIM HORMONE: CPT | Performed by: NURSE PRACTITIONER

## 2022-04-08 PROCEDURE — 36415 COLL VENOUS BLD VENIPUNCTURE: CPT | Mod: PN | Performed by: NURSE PRACTITIONER

## 2022-04-08 PROCEDURE — 3008F PR BODY MASS INDEX (BMI) DOCUMENTED: ICD-10-PCS | Mod: CPTII,,, | Performed by: NURSE PRACTITIONER

## 2022-04-08 NOTE — PROGRESS NOTES
Subjective:       Patient ID: Fco Watts is a 41 y.o. female.    Chief Complaint: Fatigue    History of Present Illness:   Fco Watts 41 y.o. female presents today with reports of fatigue and constipation. Denies any other problems or concerns at this time.     Past Medical History:   Diagnosis Date    Abnormal Pap smear of cervix     ADD (attention deficit disorder) without hyperactivity 01/02/2019    Prescribed Adderall 10 1 po qd by her previous PCP, Dr. Dorita Cota, which she takes prn inattention while studying    Attention deficit hyperactivity disorder (ADHD), predominantly inattentive type 5/3/2019    Dyslipidemia     low HDL    Thyroid disease     h/o hypothyroidism    Vitamin D deficiency      Family History   Problem Relation Age of Onset    Breast cancer Mother 70    Hypertension Mother     Colon cancer Father     Cancer Father     Ovarian cancer Neg Hx      Social History     Socioeconomic History    Marital status: Single   Tobacco Use    Smoking status: Never Smoker    Smokeless tobacco: Never Used   Substance and Sexual Activity    Alcohol use: Not Currently     Alcohol/week: 0.0 - 1.0 standard drinks    Drug use: No    Sexual activity: Yes     Partners: Male     Birth control/protection: None     Comment:    Social History Narrative    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); kids are 23 years, who is an RN; wt goal 150 lbs         Breakfast: Skips (previously 5%, previously 25%: cereal or McDonalds: sausage biscuit or steak McMuffin (previously meal replacement shake); unsweetened lemon water (previously Cokes)    Lunch: 75% Hamberger or pizza or fried chicken (previously grilled chicken or salad); 75% burger & chip (previous 25%); water    Dinner: Home cooked: baked chicken or hamberger steak & gravy (occasionally 1 slice pizza or fried chicken); cranberry juice or soda or water    Snacks: Candy bar 2-3x/wk  (previously ice cream 1x/d or chips 2x/wk or previoulsy Star crunch pie)     Eats out: 3x/m (previously 5x/wk)    Water: 36 oz/d (previously 16 oz/d, previously 48-64)    PA: None (previously 3x/wk; each 1 hour)    Goal weight is 150 lbs     Social Determinants of Health     Financial Resource Strain: Low Risk     Difficulty of Paying Living Expenses: Not hard at all   Food Insecurity: No Food Insecurity    Worried About Running Out of Food in the Last Year: Never true    Ran Out of Food in the Last Year: Never true   Transportation Needs: No Transportation Needs    Lack of Transportation (Medical): No    Lack of Transportation (Non-Medical): No   Physical Activity: Insufficiently Active    Days of Exercise per Week: 1 day    Minutes of Exercise per Session: 30 min   Stress: No Stress Concern Present    Feeling of Stress : Not at all   Social Connections: Socially Integrated    Frequency of Communication with Friends and Family: More than three times a week    Frequency of Social Gatherings with Friends and Family: More than three times a week    Attends Ep[employer] Services: More than 4 times per year    Active Member of Clubs or Organizations: Yes    Attends Club or Organization Meetings: More than 4 times per year    Marital Status:    Housing Stability: Unknown    Unable to Pay for Housing in the Last Year: No    Unstable Housing in the Last Year: No     Outpatient Encounter Medications as of 4/8/2022   Medication Sig Dispense Refill    tretinoin (RETIN-A) 0.025 % cream Apply a thin coat to the face every night.      fluconazole (DIFLUCAN) 150 MG Tab Take one pill  In one dose (Patient not taking: Reported on 4/8/2022) 4 tablet 5    metroNIDAZOLE (FLAGYL) 500 MG tablet Take all four pill in one dose (Patient not taking: Reported on 4/8/2022) 4 tablet 5    spironolactone (ALDACTONE) 50 MG tablet Take 50 mg by mouth 2 (two) times daily.       No facility-administered encounter medications  "on file as of 4/8/2022.       Review of Systems   Constitutional: Positive for fatigue. Negative for appetite change, chills and fever.   HENT: Negative for ear pain, sinus pressure, sore throat and trouble swallowing.    Eyes: Negative for visual disturbance.   Respiratory: Negative for shortness of breath.    Cardiovascular: Negative for chest pain.   Gastrointestinal: Positive for constipation. Negative for abdominal pain, diarrhea, nausea and vomiting.   Endocrine: Negative for cold intolerance, polyphagia and polyuria.   Genitourinary: Negative for decreased urine volume and dysuria.   Musculoskeletal: Negative for back pain.   Skin: Negative for rash.   Allergic/Immunologic: Negative for environmental allergies and food allergies.   Neurological: Negative for dizziness, tremors, weakness and numbness.   Hematological: Does not bruise/bleed easily.   Psychiatric/Behavioral: Negative for confusion and hallucinations. The patient is not nervous/anxious and is not hyperactive.    All other systems reviewed and are negative.      Objective:      BP (!) 108/58   Pulse 62   Temp 99 °F (37.2 °C) (Temporal)   Ht 5' 2" (1.575 m)   Wt 87.1 kg (192 lb)   LMP  (LMP Unknown) Comment: Partial Hysterectomy   SpO2 98%   BMI 35.12 kg/m²   Physical Exam  Vitals and nursing note reviewed.   Constitutional:       Appearance: She is well-developed.   HENT:      Head: Normocephalic and atraumatic.      Right Ear: External ear normal.      Left Ear: External ear normal.      Nose: Nose normal.   Eyes:      Conjunctiva/sclera: Conjunctivae normal.      Pupils: Pupils are equal, round, and reactive to light.   Cardiovascular:      Rate and Rhythm: Normal rate and regular rhythm.      Heart sounds: Normal heart sounds. No murmur heard.  Pulmonary:      Effort: Pulmonary effort is normal.      Breath sounds: Normal breath sounds. No wheezing.   Abdominal:      General: Bowel sounds are normal.      Palpations: Abdomen is soft.     "  Tenderness: There is no abdominal tenderness.   Musculoskeletal:         General: Normal range of motion.      Cervical back: Normal range of motion and neck supple.   Skin:     General: Skin is warm and dry.      Findings: No rash.   Neurological:      Mental Status: She is alert and oriented to person, place, and time.      Deep Tendon Reflexes: Reflexes are normal and symmetric.   Psychiatric:         Behavior: Behavior normal.         Thought Content: Thought content normal.         Judgment: Judgment normal.           Assessment:       1. Constipation, unspecified constipation type    2. Fatigue, unspecified type    3. Abdominal pain, unspecified abdominal location    4. History of metabolic and nutritional disorder    5. Encounter for lipid screening for cardiovascular disease    6. General medical exam        Plan:   Fco was seen today for follow-up.    Diagnoses and all orders for this visit:    Constipation, unspecified constipation type    Fatigue, unspecified type    Abdominal pain, unspecified abdominal location  -     X-Ray Abdomen Flat And Erect; Future    History of metabolic and nutritional disorder  -     Hemoglobin A1C; Future  -     TSH; Future  -     T4, Free; Future    Encounter for lipid screening for cardiovascular disease  -     Lipid Panel; Future    General medical exam  -     CBC Auto Differential; Future  -     Comprehensive Metabolic Panel; Future              [facility]   [address]  [place] [address]  Office [phone]  Fax [phone]

## 2022-04-11 ENCOUNTER — HOSPITAL ENCOUNTER (OUTPATIENT)
Dept: RADIOLOGY | Facility: HOSPITAL | Age: 42
Discharge: HOME | End: 2022-04-11
Attending: NURSE PRACTITIONER
Payer: MEDICAID

## 2022-04-11 DIAGNOSIS — R10.9 ABDOMINAL PAIN, UNSPECIFIED ABDOMINAL LOCATION: ICD-10-CM

## 2022-04-11 PROCEDURE — 74019 RADEX ABDOMEN 2 VIEWS: CPT | Mod: TC,PN

## 2022-04-11 PROCEDURE — 74019 XR ABDOMEN FLAT AND ERECT: ICD-10-PCS | Mod: 26,,, | Performed by: RADIOLOGY

## 2022-04-11 PROCEDURE — 74019 RADEX ABDOMEN 2 VIEWS: CPT | Mod: 26,,, | Performed by: RADIOLOGY

## 2022-04-19 ENCOUNTER — OFFICE VISIT (OUTPATIENT)
Dept: OBSTETRICS AND GYNECOLOGY | Facility: CLINIC | Age: 42
End: 2022-04-19
Payer: MEDICAID

## 2022-04-19 VITALS
BODY MASS INDEX: 33.65 KG/M2 | HEIGHT: 62 IN | DIASTOLIC BLOOD PRESSURE: 78 MMHG | WEIGHT: 182.88 LBS | SYSTOLIC BLOOD PRESSURE: 112 MMHG

## 2022-04-19 DIAGNOSIS — Z80.3 FAMILY HISTORY OF BREAST CANCER IN MOTHER: ICD-10-CM

## 2022-04-19 DIAGNOSIS — B96.89 BV (BACTERIAL VAGINOSIS): ICD-10-CM

## 2022-04-19 DIAGNOSIS — N76.0 BV (BACTERIAL VAGINOSIS): ICD-10-CM

## 2022-04-19 DIAGNOSIS — Z01.419 ENCOUNTER FOR GYNECOLOGICAL EXAMINATION WITHOUT ABNORMAL FINDING: Primary | ICD-10-CM

## 2022-04-19 DIAGNOSIS — Z80.0 FAMILY HISTORY OF COLON CANCER IN FATHER: ICD-10-CM

## 2022-04-19 DIAGNOSIS — N76.0 RECURRENT VAGINITIS: ICD-10-CM

## 2022-04-19 PROBLEM — Z98.890 HISTORY OF COLPOSCOPY: Status: ACTIVE | Noted: 2022-04-19

## 2022-04-19 PROCEDURE — 87801 DETECT AGNT MULT DNA AMPLI: CPT | Performed by: NURSE PRACTITIONER

## 2022-04-19 PROCEDURE — 3044F PR MOST RECENT HEMOGLOBIN A1C LEVEL <7.0%: ICD-10-PCS | Mod: CPTII,,, | Performed by: NURSE PRACTITIONER

## 2022-04-19 PROCEDURE — 99396 PREV VISIT EST AGE 40-64: CPT | Mod: S$PBB,,, | Performed by: NURSE PRACTITIONER

## 2022-04-19 PROCEDURE — 1160F RVW MEDS BY RX/DR IN RCRD: CPT | Mod: CPTII,,, | Performed by: NURSE PRACTITIONER

## 2022-04-19 PROCEDURE — 99396 PR PREVENTIVE VISIT,EST,40-64: ICD-10-PCS | Mod: S$PBB,,, | Performed by: NURSE PRACTITIONER

## 2022-04-19 PROCEDURE — 87591 N.GONORRHOEAE DNA AMP PROB: CPT | Performed by: NURSE PRACTITIONER

## 2022-04-19 PROCEDURE — 99999 PR PBB SHADOW E&M-EST. PATIENT-LVL III: CPT | Mod: PBBFAC,,, | Performed by: NURSE PRACTITIONER

## 2022-04-19 PROCEDURE — 3078F PR MOST RECENT DIASTOLIC BLOOD PRESSURE < 80 MM HG: ICD-10-PCS | Mod: CPTII,,, | Performed by: NURSE PRACTITIONER

## 2022-04-19 PROCEDURE — 1160F PR REVIEW ALL MEDS BY PRESCRIBER/CLIN PHARMACIST DOCUMENTED: ICD-10-PCS | Mod: CPTII,,, | Performed by: NURSE PRACTITIONER

## 2022-04-19 PROCEDURE — 3074F PR MOST RECENT SYSTOLIC BLOOD PRESSURE < 130 MM HG: ICD-10-PCS | Mod: CPTII,,, | Performed by: NURSE PRACTITIONER

## 2022-04-19 PROCEDURE — 3008F BODY MASS INDEX DOCD: CPT | Mod: CPTII,,, | Performed by: NURSE PRACTITIONER

## 2022-04-19 PROCEDURE — 87481 CANDIDA DNA AMP PROBE: CPT | Mod: 59 | Performed by: NURSE PRACTITIONER

## 2022-04-19 PROCEDURE — 99213 OFFICE O/P EST LOW 20 MIN: CPT | Mod: PBBFAC,PN | Performed by: NURSE PRACTITIONER

## 2022-04-19 PROCEDURE — 1159F PR MEDICATION LIST DOCUMENTED IN MEDICAL RECORD: ICD-10-PCS | Mod: CPTII,,, | Performed by: NURSE PRACTITIONER

## 2022-04-19 PROCEDURE — 3044F HG A1C LEVEL LT 7.0%: CPT | Mod: CPTII,,, | Performed by: NURSE PRACTITIONER

## 2022-04-19 PROCEDURE — 3078F DIAST BP <80 MM HG: CPT | Mod: CPTII,,, | Performed by: NURSE PRACTITIONER

## 2022-04-19 PROCEDURE — 3008F PR BODY MASS INDEX (BMI) DOCUMENTED: ICD-10-PCS | Mod: CPTII,,, | Performed by: NURSE PRACTITIONER

## 2022-04-19 PROCEDURE — 87491 CHLMYD TRACH DNA AMP PROBE: CPT | Performed by: NURSE PRACTITIONER

## 2022-04-19 PROCEDURE — 99999 PR PBB SHADOW E&M-EST. PATIENT-LVL III: ICD-10-PCS | Mod: PBBFAC,,, | Performed by: NURSE PRACTITIONER

## 2022-04-19 PROCEDURE — 1159F MED LIST DOCD IN RCRD: CPT | Mod: CPTII,,, | Performed by: NURSE PRACTITIONER

## 2022-04-19 PROCEDURE — 3074F SYST BP LT 130 MM HG: CPT | Mod: CPTII,,, | Performed by: NURSE PRACTITIONER

## 2022-04-19 RX ORDER — METRONIDAZOLE 500 MG/1
500 TABLET ORAL 2 TIMES DAILY
Qty: 14 TABLET | Refills: 0 | Status: SHIPPED | OUTPATIENT
Start: 2022-04-19 | End: 2022-04-26

## 2022-04-19 NOTE — PROGRESS NOTES
Subjective:       Patient ID: Fco Watts is a 41 y.o. female.    Chief Complaint:  Well Woman      History of Present Illness  HPI  Annual Exam-Premenopausal   presents for annual exam.  The patient is sexually active with her .  C/o dryness.  Has tried coconut oil, but is very sensitive in vaginal area; mild relief. GYN screening history: last pap: approximate date 2017 and was normal and last mammogram: approximate date 2022 and was normal.  Hx of colpo many years ago.  hyst for non-cancerous reason; ovaries preserved.  The patient wears seatbelts: yes. The patient participates in regular exercise: yes. Just started a week ago with adipex.  Has the patient ever been transfused or tattooed?: yes. Tattoos.  Blood transfusion after 2008.  The patient reports that there is not domestic violence in her life.    Her mother dx 4 months ago with breast cancer.  Would like to see breast specialist    C/o vaginal odor x4d; after intercourse there is a weird odor; showers with just switched to dove unscented before original; no douching; tide detergent; switched six months ago.      LPN    GYN & OB History  No LMP recorded (lmp unknown). Patient has had a hysterectomy.   Date of Last Pap: No result found    OB History    Para Term  AB Living   7 6 5 1 1 6   SAB IAB Ectopic Multiple Live Births   1       6      # Outcome Date GA Lbr Girish/2nd Weight Sex Delivery Anes PTL Lv   7 SAB         FD   6 Term     F CS-LTranv   ANTONIO   5 Term     F Vag-Spont   ANTONIO   4 Term     F Vag-Spont   ANTONIO   3 Term     F Vag-Spont   ANTONIO   2 Term     F Vag-Spont   ANTONIO   1      F Vag-Spont   ANTONIO       Review of Systems  Review of Systems   Constitutional: Negative for chills, fatigue and fever.   Respiratory: Negative for cough, shortness of breath and wheezing.    Gastrointestinal: Negative for abdominal pain, nausea and vomiting.   Genitourinary: Positive for vaginal dryness and vaginal  odor. Negative for dysuria, frequency, genital sores, pelvic pain, urgency, vaginal bleeding, vaginal discharge, vaginal pain and urinary incontinence.   Musculoskeletal: Negative for back pain.   Neurological: Negative for headaches.   Hematological: Negative for adenopathy.   All other systems reviewed and are negative.          Objective:      Physical Exam:   Constitutional: She is oriented to person, place, and time. She appears well-developed and well-nourished. No distress.    HENT:   Head: Normocephalic and atraumatic.   Nose: Nose normal.    Eyes: Pupils are equal, round, and reactive to light. Conjunctivae and EOM are normal. Right eye exhibits no discharge. Left eye exhibits no discharge.     Cardiovascular: Normal rate, regular rhythm and normal heart sounds.  Exam reveals no gallop, no friction rub, no clubbing, no cyanosis and no edema.    No murmur heard.   Pulmonary/Chest: Effort normal and breath sounds normal. No respiratory distress. She has no decreased breath sounds. She has no wheezes. She has no rhonchi. She has no rales. She exhibits no tenderness. Right breast exhibits no inverted nipple, no mass, no nipple discharge, no skin change, no tenderness, no bleeding and no swelling. Left breast exhibits no inverted nipple, no mass, no nipple discharge, no skin change, no tenderness, no bleeding and no swelling. Breasts are symmetrical.        Abdominal: Soft. Bowel sounds are normal. She exhibits no distension. There is no abdominal tenderness. There is no rebound and no guarding. Hernia confirmed negative in the right inguinal area and confirmed negative in the left inguinal area.     Genitourinary:    Inguinal canal, right adnexa and left adnexa normal.   Rectum:      No external hemorrhoid.      Pelvic exam was performed with patient supine.   The external female genitalia was normal.   No external genitalia lesions identified,Genitalia hair distrobution normal .   Labial bartholins  normal.There is no rash, tenderness, lesion or injury on the right labia. There is no rash, tenderness, lesion or injury on the left labia. Right adnexum displays no mass, no tenderness and no fullness. Left adnexum displays no mass, no tenderness and no fullness. Vaginal cuff normal.  There is vaginal discharge (white, milky with an odor) in the vagina. No erythema, tenderness or bleeding in the vagina.    No foreign body in the vagina.      No signs of injury in the vagina.   Cervix is absent.Uterus is absent. Normal urethral meatus.Urethral Meatus exhibits: urethral lesion and prolapsedUrethra findings: no urethral mass, no tenderness and no urethral scarringBladder findings: no bladder distention and no bladder tenderness          Musculoskeletal: Normal range of motion and moves all extremeties.      Lymphadenopathy: No inguinal adenopathy noted on the right or left side.    Neurological: She is alert and oriented to person, place, and time.    Skin: Skin is warm and dry. No rash noted. She is not diaphoretic. No cyanosis or erythema. No pallor. Nails show no clubbing.    Psychiatric: She has a normal mood and affect. Her speech is normal and behavior is normal. Judgment and thought content normal.             Assessment:        1. Encounter for gynecological examination without abnormal finding    2. Recurrent vaginitis    3. BV (bacterial vaginosis)    4. Family history of breast cancer in mother    5. Family history of colon cancer in father               Plan:   Suspect BV; Rx sent for flagyl. Avoid alcohol and intercourse while taking.    Patient was counseled today on vaginitis prevention including :  a. avoiding feminine products such as deodorant soaps, body wash, bubble bath, douches, scented toilet paper, deodorant tampons or pads, feminine wipes, chronic pad use, etc.  b. avoiding other vulvovaginal irritants such as long hot baths, humidity, tight, synthetic clothing, chlorine and sitting around in  wet bathing suits  c. wearing cotton underwear, avoiding thong underwear and no underwear to bed  d. taking showers instead of baths and use a hair dryer on cool setting afterwards to dry  e. wearing cotton to exercise and shower immediately after exercise and change clothes    cx collected  mammo continue yearly until age 76  Recommend annual pelvic exams.  Reviewed recommendations for annual CBE.  Pap not indicated due to hx of nml pap and hx of mayda  RTC 1 year or sooner prn.  To PCP for other health maintenance.          Encounter for gynecological examination without abnormal finding  -     Vaginosis Screen by DNA Probe  -     C. trachomatis/N. gonorrhoeae by AMP DNA    Recurrent vaginitis  -     Vaginosis Screen by DNA Probe  -     C. trachomatis/N. gonorrhoeae by AMP DNA    BV (bacterial vaginosis)  -     metroNIDAZOLE (FLAGYL) 500 MG tablet; Take 1 tablet (500 mg total) by mouth 2 (two) times daily. for 7 days  Dispense: 14 tablet; Refill: 0    Family history of breast cancer in mother  -     Ambulatory referral/consult to Hematology / Oncology; Future; Expected date: 04/26/2022    Family history of colon cancer in father  -     Ambulatory referral/consult to Hematology / Oncology; Future; Expected date: 04/26/2022

## 2022-04-20 LAB
C TRACH DNA SPEC QL NAA+PROBE: NOT DETECTED
N GONORRHOEA DNA SPEC QL NAA+PROBE: NOT DETECTED

## 2022-05-13 ENCOUNTER — PATIENT MESSAGE (OUTPATIENT)
Dept: OBSTETRICS AND GYNECOLOGY | Facility: CLINIC | Age: 42
End: 2022-05-13
Payer: MEDICAID

## 2022-05-13 ENCOUNTER — OFFICE VISIT (OUTPATIENT)
Dept: OBSTETRICS AND GYNECOLOGY | Facility: CLINIC | Age: 42
End: 2022-05-13
Payer: MEDICAID

## 2022-05-13 VITALS
HEIGHT: 62 IN | BODY MASS INDEX: 33.09 KG/M2 | WEIGHT: 179.81 LBS | DIASTOLIC BLOOD PRESSURE: 74 MMHG | SYSTOLIC BLOOD PRESSURE: 122 MMHG

## 2022-05-13 DIAGNOSIS — N76.1 CHRONIC VAGINITIS: ICD-10-CM

## 2022-05-13 DIAGNOSIS — N89.8 VAGINAL ODOR: Primary | ICD-10-CM

## 2022-05-13 PROCEDURE — 3078F DIAST BP <80 MM HG: CPT | Mod: CPTII,,, | Performed by: NURSE PRACTITIONER

## 2022-05-13 PROCEDURE — 3074F SYST BP LT 130 MM HG: CPT | Mod: CPTII,,, | Performed by: NURSE PRACTITIONER

## 2022-05-13 PROCEDURE — 3044F PR MOST RECENT HEMOGLOBIN A1C LEVEL <7.0%: ICD-10-PCS | Mod: CPTII,,, | Performed by: NURSE PRACTITIONER

## 2022-05-13 PROCEDURE — 1160F RVW MEDS BY RX/DR IN RCRD: CPT | Mod: CPTII,,, | Performed by: NURSE PRACTITIONER

## 2022-05-13 PROCEDURE — 3044F HG A1C LEVEL LT 7.0%: CPT | Mod: CPTII,,, | Performed by: NURSE PRACTITIONER

## 2022-05-13 PROCEDURE — 1160F PR REVIEW ALL MEDS BY PRESCRIBER/CLIN PHARMACIST DOCUMENTED: ICD-10-PCS | Mod: CPTII,,, | Performed by: NURSE PRACTITIONER

## 2022-05-13 PROCEDURE — 1159F MED LIST DOCD IN RCRD: CPT | Mod: CPTII,,, | Performed by: NURSE PRACTITIONER

## 2022-05-13 PROCEDURE — 3008F BODY MASS INDEX DOCD: CPT | Mod: CPTII,,, | Performed by: NURSE PRACTITIONER

## 2022-05-13 PROCEDURE — 3074F PR MOST RECENT SYSTOLIC BLOOD PRESSURE < 130 MM HG: ICD-10-PCS | Mod: CPTII,,, | Performed by: NURSE PRACTITIONER

## 2022-05-13 PROCEDURE — 3008F PR BODY MASS INDEX (BMI) DOCUMENTED: ICD-10-PCS | Mod: CPTII,,, | Performed by: NURSE PRACTITIONER

## 2022-05-13 PROCEDURE — 99999 PR PBB SHADOW E&M-EST. PATIENT-LVL III: ICD-10-PCS | Mod: PBBFAC,,, | Performed by: NURSE PRACTITIONER

## 2022-05-13 PROCEDURE — 99999 PR PBB SHADOW E&M-EST. PATIENT-LVL III: CPT | Mod: PBBFAC,,, | Performed by: NURSE PRACTITIONER

## 2022-05-13 PROCEDURE — 87801 DETECT AGNT MULT DNA AMPLI: CPT | Performed by: NURSE PRACTITIONER

## 2022-05-13 PROCEDURE — 99213 OFFICE O/P EST LOW 20 MIN: CPT | Mod: PBBFAC,PN | Performed by: NURSE PRACTITIONER

## 2022-05-13 PROCEDURE — 87481 CANDIDA DNA AMP PROBE: CPT | Mod: 59 | Performed by: NURSE PRACTITIONER

## 2022-05-13 PROCEDURE — 99213 PR OFFICE/OUTPT VISIT, EST, LEVL III, 20-29 MIN: ICD-10-PCS | Mod: S$PBB,,, | Performed by: NURSE PRACTITIONER

## 2022-05-13 PROCEDURE — 99213 OFFICE O/P EST LOW 20 MIN: CPT | Mod: S$PBB,,, | Performed by: NURSE PRACTITIONER

## 2022-05-13 PROCEDURE — 1159F PR MEDICATION LIST DOCUMENTED IN MEDICAL RECORD: ICD-10-PCS | Mod: CPTII,,, | Performed by: NURSE PRACTITIONER

## 2022-05-13 PROCEDURE — 3078F PR MOST RECENT DIASTOLIC BLOOD PRESSURE < 80 MM HG: ICD-10-PCS | Mod: CPTII,,, | Performed by: NURSE PRACTITIONER

## 2022-05-13 RX ORDER — METRONIDAZOLE 500 MG/1
500 TABLET ORAL EVERY 12 HOURS
Qty: 14 TABLET | Refills: 0 | Status: SHIPPED | OUTPATIENT
Start: 2022-05-13 | End: 2022-05-20

## 2022-05-13 RX ORDER — METRONIDAZOLE 7.5 MG/G
GEL VAGINAL
Qty: 70 G | Refills: 2 | Status: SHIPPED | OUTPATIENT
Start: 2022-05-13 | End: 2023-03-17 | Stop reason: SDUPTHER

## 2022-05-13 NOTE — PROGRESS NOTES
"  Fco Watts is a 41 y.o. female  presents with complaint of vaginal discharge and odor for last 2 days but pt is in almost monthly with complaints due to vaginal odor and discharge.     Past Medical History:   Diagnosis Date    Abnormal Pap smear of cervix     ADD (attention deficit disorder) without hyperactivity 2019    Prescribed Adderall 10 1 po qd by her previous PCP, Dr. Dorita Cota, which she takes prn inattention while studying    Attention deficit hyperactivity disorder (ADHD), predominantly inattentive type 5/3/2019    Dyslipidemia     low HDL    Thyroid disease     h/o hypothyroidism    Vitamin D deficiency      Past Surgical History:   Procedure Laterality Date     SECTION  06/23/2010    x1    DILATION AND CURETTAGE OF UTERUS      stillbirth    HYSTERECTOMY      Still has ovaries (hx abnl pap); mayda     Social History     Tobacco Use    Smoking status: Never Smoker    Smokeless tobacco: Never Used   Substance Use Topics    Alcohol use: Not Currently     Alcohol/week: 0.0 - 1.0 standard drinks    Drug use: No     Family History   Problem Relation Age of Onset    Breast cancer Mother 71    Hypertension Mother     Colon cancer Father 60    Ovarian cancer Neg Hx      OB History    Para Term  AB Living   7 6 5 1 1 6   SAB IAB Ectopic Multiple Live Births   1       6      # Outcome Date GA Lbr Girish/2nd Weight Sex Delivery Anes PTL Lv   7 SAB         FD   6 Term     F CS-LTranv   ANTONIO   5 Term     F Vag-Spont   ANTONIO   4 Term     F Vag-Spont   ANTONIO   3 Term     F Vag-Spont   ANTONIO   2 Term     F Vag-Spont   ANTONIO   1      F Vag-Spont   ANTONIO       /74   Ht 5' 2" (1.575 m)   Wt 81.5 kg (179 lb 12.6 oz)   LMP  (LMP Unknown) Comment: Partial Hysterectomy   BMI 32.88 kg/m²     ROS:  Per hpi    PHYSICAL EXAM:  VULVA: normal appearing vulva with no masses, tenderness or lesions, VAGINA: vaginal discharge - white    ASSESSMENT and PLAN:  1. " Vaginal odor  Vaginosis Screen by DNA Probe    metroNIDAZOLE (FLAGYL) 500 MG tablet    metroNIDAZOLE (METROGEL) 0.75 % vaginal gel   2. Chronic vaginitis  Vaginosis Screen by DNA Probe    metroNIDAZOLE (FLAGYL) 500 MG tablet    metroNIDAZOLE (METROGEL) 0.75 % vaginal gel       Patient was counseled today on vaginitis prevention including :  a. avoiding feminine products such as deoderant soaps, body wash, bubble bath, douches, scented toilet paper, deoderant tampons or pads, feminine wipes, chronic pad use, etc.  b. avoiding other vulvovaginal irritants such as long hot baths, humidity, tight, synthetic clothing, chlorine and sitting around in wet bathing suits  c. wearing cotton underwear, avoiding thong underwear and no underwear to bed  d. taking showers instead of baths and use a hair dryer on cool setting afterwards to dry  e. wearing cotton to exercise and shower immediately after exercise and change clothes  f. using polyurethane condoms without spermicide if sexually active and symptoms are triggered by intercourse

## 2022-05-18 ENCOUNTER — PATIENT MESSAGE (OUTPATIENT)
Dept: OBSTETRICS AND GYNECOLOGY | Facility: CLINIC | Age: 42
End: 2022-05-18
Payer: MEDICAID

## 2022-06-24 ENCOUNTER — OFFICE VISIT (OUTPATIENT)
Dept: PRIMARY CARE CLINIC | Facility: CLINIC | Age: 42
End: 2022-06-24
Payer: MEDICAID

## 2022-06-24 DIAGNOSIS — R51.9 ACUTE NONINTRACTABLE HEADACHE, UNSPECIFIED HEADACHE TYPE: ICD-10-CM

## 2022-06-24 DIAGNOSIS — R21 RASH: ICD-10-CM

## 2022-06-24 DIAGNOSIS — J06.9 VIRAL URI WITH COUGH: Primary | ICD-10-CM

## 2022-06-24 PROCEDURE — 99213 OFFICE O/P EST LOW 20 MIN: CPT | Mod: 95,,, | Performed by: NURSE PRACTITIONER

## 2022-06-24 PROCEDURE — 3044F HG A1C LEVEL LT 7.0%: CPT | Mod: CPTII,95,, | Performed by: NURSE PRACTITIONER

## 2022-06-24 PROCEDURE — 1160F RVW MEDS BY RX/DR IN RCRD: CPT | Mod: CPTII,95,, | Performed by: NURSE PRACTITIONER

## 2022-06-24 PROCEDURE — 1159F PR MEDICATION LIST DOCUMENTED IN MEDICAL RECORD: ICD-10-PCS | Mod: CPTII,95,, | Performed by: NURSE PRACTITIONER

## 2022-06-24 PROCEDURE — 99213 PR OFFICE/OUTPT VISIT, EST, LEVL III, 20-29 MIN: ICD-10-PCS | Mod: 95,,, | Performed by: NURSE PRACTITIONER

## 2022-06-24 PROCEDURE — 1160F PR REVIEW ALL MEDS BY PRESCRIBER/CLIN PHARMACIST DOCUMENTED: ICD-10-PCS | Mod: CPTII,95,, | Performed by: NURSE PRACTITIONER

## 2022-06-24 PROCEDURE — 3044F PR MOST RECENT HEMOGLOBIN A1C LEVEL <7.0%: ICD-10-PCS | Mod: CPTII,95,, | Performed by: NURSE PRACTITIONER

## 2022-06-24 PROCEDURE — 1159F MED LIST DOCD IN RCRD: CPT | Mod: CPTII,95,, | Performed by: NURSE PRACTITIONER

## 2022-06-24 RX ORDER — PROMETHAZINE HYDROCHLORIDE AND DEXTROMETHORPHAN HYDROBROMIDE 6.25; 15 MG/5ML; MG/5ML
5 SYRUP ORAL EVERY 6 HOURS PRN
Qty: 140 ML | Refills: 0 | Status: SHIPPED | OUTPATIENT
Start: 2022-06-24 | End: 2023-04-04

## 2022-06-24 NOTE — PROGRESS NOTES
The patient location is: [place], [place]  The chief complaint leading to consultation is: cough, rash    Visit type: audiovisual    Face to Face time with patient: 17 minutes  20 minutes of total time spent on the encounter, which includes face to face time and non-face to face time preparing to see the patient (eg, review of tests), Obtaining and/or reviewing separately obtained history, Documenting clinical information in the electronic or other health record, Independently interpreting results (not separately reported) and communicating results to the patient/family/caregiver, or Care coordination (not separately reported).         Each patient to whom he or she provides medical services by telemedicine is:  (1) informed of the relationship between the physician and patient and the respective role of any other health care provider with respect to management of the patient; and (2) notified that he or she may decline to receive medical services by telemedicine and may withdraw from such care at any time.    Notes:   Subjective:      Patient ID: Fco Watts is a 41 y.o. female.    Chief Complaint: No chief complaint on file.    LMP  (LMP Unknown) Comment: Partial Hysterectomy     Pt presents for virtual visit for persistent cough. States she went to urgent care about 1 1/2 weeks ago and was diagnosed with a sinus infection and given steroids, antibiotics and a cough suppressant. Pt states she is still have episodes of coughing that induces a headache and noticed she has ear pain for the past two days which is a new symptom. Denies fever. Has completed all medication given in UC. Pt also say she has a red itchy rash on her thigh and arm that she has put a steroid cream on for 3 days and it hasn't gone away. Denies blistering and drainage. Pt asking if she should get retested for covid since she found out today that her sister in law tested positive yesterday and she was around her on Sunday.    Cough  This  is a new problem. The current episode started 1 to 4 weeks ago. The problem has been gradually improving. The cough is non-productive. Associated symptoms include ear pain, headaches, a rash and rhinorrhea. Pertinent negatives include no chest pain, chills, ear congestion, fever, heartburn, hemoptysis, myalgias, nasal congestion, postnasal drip, sore throat, shortness of breath, sweats, weight loss or wheezing. The symptoms are aggravated by cold air. She has tried OTC cough suppressant for the symptoms. The treatment provided mild relief. Her past medical history is significant for pneumonia. There is no history of asthma, bronchiectasis, bronchitis, COPD, emphysema or environmental allergies.       Review of patient's allergies indicates:   Allergen Reactions    Metformin Other (See Comments)     GI PROBLEMS        Review of Systems   Constitutional: Negative for chills, fever and weight loss.   HENT: Positive for ear pain and rhinorrhea. Negative for postnasal drip and sore throat.    Respiratory: Positive for cough. Negative for hemoptysis, shortness of breath and wheezing.    Cardiovascular: Negative for chest pain.   Gastrointestinal: Negative for heartburn.   Musculoskeletal: Negative for myalgias.   Skin: Positive for rash.   Neurological: Positive for headaches.   Endo/Heme/Allergies: Negative for environmental allergies.      Objective:      Physical Exam  Constitutional:       Appearance: Normal appearance.   HENT:      Head: Atraumatic.   Eyes:      General: Gaze aligned appropriately.      Extraocular Movements: Extraocular movements intact.   Pulmonary:      Effort: Pulmonary effort is normal.      Comments: Frequent coughing during visit  Neurological:      Mental Status: She is alert and oriented to person, place, and time.   Psychiatric:         Attention and Perception: Attention normal.         Mood and Affect: Mood normal.         Speech: Speech normal.         Thought Content: Thought content  normal.         Cognition and Memory: Cognition normal.         Assessment:       1. Viral URI with cough    2. Acute nonintractable headache, unspecified headache type    3. Rash        Plan:     Viral URI with cough  -     promethazine-dextromethorphan (PROMETHAZINE-DM) 6.25-15 mg/5 mL Syrp; Take 5 mLs by mouth every 6 (six) hours as needed (cough).  Dispense: 140 mL; Refill: 0    Acute nonintractable headache, unspecified headache type    Rash    · Get plenty of rest.  · Increase fluids.   · May apply warm compresses as needed for facial pain and congestion.   · Saline nasal spray to loosen nasal congestion.  · Humidifier or steamy shower may help with congestion.  · Flonase or Nasacort to reduce inflammation in the sinus cavities.  · You may take an over the counter 24 hour antihistamine such as Zyrtec or Claritin for allergy symptoms such as sneezing, itchy/watery eyes, scratchy throat, or congestion.  · Warm salt water gargles, Cepacol throat lozenges, or Chloraseptic spray for sore throat.  · Take Tylenol or Ibuprofen as needed for sore throat, body aches, or fever.  · Take an over the counter cough suppressant such as Delsym or Robitussin DM as directed on label for cough.  · Follow up with your primary care provider if symptoms persist >10 days or sooner for any new or worsening symptoms.   · Go to the ER for any fever that does not improve with Tylenol/Ibuprofen, neck stiffness, rash, severe headache, vision changes, shortness of breath, chest pain, facial swelling, severe facial pain, or any other new and concerning symptoms.     Apply hydrocortisone 3 times daily for 7-10 days as needed for itchy rash.  Apply a cool compress or ice to rash to help with itching and irritation.  Follow up with urgent care for covid testing.

## 2022-06-24 NOTE — PATIENT INSTRUCTIONS
Get plenty of rest.  Increase fluids.   May apply warm compresses as needed for facial pain and congestion.   Saline nasal spray to loosen nasal congestion.  Humidifier or steamy shower may help with congestion.  Flonase or Nasacort to reduce inflammation in the sinus cavities.  You may take an over the counter 24 hour antihistamine such as Zyrtec or Claritin for allergy symptoms such as sneezing, itchy/watery eyes, scratchy throat, or congestion.  Warm salt water gargles, Cepacol throat lozenges, or Chloraseptic spray for sore throat.  Take Tylenol or Ibuprofen as needed for sore throat, body aches, or fever.  Take an over the counter cough suppressant such as Delsym or Robitussin DM as directed on label for cough.  Follow up with your primary care provider if symptoms persist >10 days or sooner for any new or worsening symptoms.   Go to the ER for any fever that does not improve with Tylenol/Ibuprofen, neck stiffness, rash, severe headache, vision changes, shortness of breath, chest pain, facial swelling, severe facial pain, or any other new and concerning symptoms.     Apply hydrocortisone 3 times daily for 7-10 days as needed for itchy rash.  Apply a cool compress or ice to rash to help with itching and irritation.  Follow up with urgent care for covid testing.

## 2022-07-18 ENCOUNTER — DOCUMENTATION ONLY (OUTPATIENT)
Dept: HEMATOLOGY/ONCOLOGY | Facility: CLINIC | Age: 42
End: 2022-07-18
Payer: MEDICAID

## 2022-07-18 NOTE — PROGRESS NOTES
[facility] Referral Nurse Note     Nurse reached out to pt with a April 2022 referral to offer an apt,  nurse explained the purpose of the visit, it would be both educational and a physical breast exam at the time of the visit, genetic risk factors of breast cancer due to her mother with hx of breast cancer and father's colon cancer will be address to increase pt's awareness.  She expressed appreciation of the call and the apt, pt knows if the date/time doesn't work to please call office to assist with rescheduling.   Pt  knows kasandra will do a breast exam at time of the visit as well as  the educational presentation of risk factors, she was pleased and accepted the apt time/date/location with Kasandra Espana NP [facility].

## 2022-09-22 ENCOUNTER — DOCUMENTATION ONLY (OUTPATIENT)
Dept: HEMATOLOGY/ONCOLOGY | Facility: CLINIC | Age: 42
End: 2022-09-22
Payer: MEDICAID

## 2022-09-22 NOTE — PROGRESS NOTES
Hematology  Nurse Note  pt  has 3  scheduled  apt and canceled each of them, nurse called pt no answer, unable to leave voice message.

## 2022-09-28 ENCOUNTER — TELEPHONE (OUTPATIENT)
Dept: OBSTETRICS AND GYNECOLOGY | Facility: CLINIC | Age: 42
End: 2022-09-28
Payer: MEDICAID

## 2022-09-28 NOTE — TELEPHONE ENCOUNTER
Attempted to contact patient. No answer, unable to leave voice mail.     ----- Message from Amaya Hsieh sent at 9/28/2022  3:47 PM CDT -----  Contact: self/[phone]  Pt is calling regarding appt . Please contact back through [email or web address] /[phone] . Thanks/dj

## 2022-10-06 ENCOUNTER — OFFICE VISIT (OUTPATIENT)
Dept: OBSTETRICS AND GYNECOLOGY | Facility: CLINIC | Age: 42
End: 2022-10-06
Payer: MEDICAID

## 2022-10-06 VITALS
WEIGHT: 186.75 LBS | SYSTOLIC BLOOD PRESSURE: 123 MMHG | DIASTOLIC BLOOD PRESSURE: 84 MMHG | HEIGHT: 66 IN | BODY MASS INDEX: 30.01 KG/M2

## 2022-10-06 DIAGNOSIS — N76.0 BACTERIAL VAGINOSIS: ICD-10-CM

## 2022-10-06 DIAGNOSIS — B96.89 BACTERIAL VAGINOSIS: ICD-10-CM

## 2022-10-06 DIAGNOSIS — Z12.31 ENCOUNTER FOR SCREENING MAMMOGRAM FOR BREAST CANCER: ICD-10-CM

## 2022-10-06 DIAGNOSIS — N89.8 VAGINAL ODOR: Primary | ICD-10-CM

## 2022-10-06 PROBLEM — M67.911: Status: ACTIVE | Noted: 2022-10-06

## 2022-10-06 PROCEDURE — 1159F PR MEDICATION LIST DOCUMENTED IN MEDICAL RECORD: ICD-10-PCS | Mod: CPTII,,,

## 2022-10-06 PROCEDURE — 81514 NFCT DS BV&VAGINITIS DNA ALG: CPT

## 2022-10-06 PROCEDURE — 3008F PR BODY MASS INDEX (BMI) DOCUMENTED: ICD-10-PCS | Mod: CPTII,,,

## 2022-10-06 PROCEDURE — 99999 PR PBB SHADOW E&M-EST. PATIENT-LVL III: CPT | Mod: PBBFAC,,,

## 2022-10-06 PROCEDURE — 3044F HG A1C LEVEL LT 7.0%: CPT | Mod: CPTII,,,

## 2022-10-06 PROCEDURE — 3079F DIAST BP 80-89 MM HG: CPT | Mod: CPTII,,,

## 2022-10-06 PROCEDURE — 1159F MED LIST DOCD IN RCRD: CPT | Mod: CPTII,,,

## 2022-10-06 PROCEDURE — 99999 PR PBB SHADOW E&M-EST. PATIENT-LVL III: ICD-10-PCS | Mod: PBBFAC,,,

## 2022-10-06 PROCEDURE — 99213 OFFICE O/P EST LOW 20 MIN: CPT | Mod: PBBFAC

## 2022-10-06 PROCEDURE — 3008F BODY MASS INDEX DOCD: CPT | Mod: CPTII,,,

## 2022-10-06 PROCEDURE — 3074F PR MOST RECENT SYSTOLIC BLOOD PRESSURE < 130 MM HG: ICD-10-PCS | Mod: CPTII,,,

## 2022-10-06 PROCEDURE — 99214 PR OFFICE/OUTPT VISIT, EST, LEVL IV, 30-39 MIN: ICD-10-PCS | Mod: S$PBB,,,

## 2022-10-06 PROCEDURE — 3079F PR MOST RECENT DIASTOLIC BLOOD PRESSURE 80-89 MM HG: ICD-10-PCS | Mod: CPTII,,,

## 2022-10-06 PROCEDURE — 3044F PR MOST RECENT HEMOGLOBIN A1C LEVEL <7.0%: ICD-10-PCS | Mod: CPTII,,,

## 2022-10-06 PROCEDURE — 3074F SYST BP LT 130 MM HG: CPT | Mod: CPTII,,,

## 2022-10-06 PROCEDURE — 99214 OFFICE O/P EST MOD 30 MIN: CPT | Mod: S$PBB,,,

## 2022-10-06 RX ORDER — CLINDAMYCIN HYDROCHLORIDE 300 MG/1
300 CAPSULE ORAL 2 TIMES DAILY
Qty: 14 CAPSULE | Refills: 0 | Status: CANCELLED | OUTPATIENT
Start: 2022-10-06 | End: 2022-10-13

## 2022-10-06 RX ORDER — METRONIDAZOLE 7.5 MG/G
1 GEL VAGINAL 2 TIMES DAILY
Qty: 70 G | Refills: 0 | Status: SHIPPED | OUTPATIENT
Start: 2022-10-06 | End: 2023-03-17 | Stop reason: SDUPTHER

## 2022-10-06 RX ORDER — FLUCONAZOLE 150 MG/1
150 TABLET ORAL DAILY
Qty: 2 TABLET | Refills: 0 | Status: SHIPPED | OUTPATIENT
Start: 2022-10-06 | End: 2022-10-08

## 2022-10-06 RX ORDER — CLINDAMYCIN HYDROCHLORIDE 300 MG/1
300 CAPSULE ORAL 2 TIMES DAILY
Qty: 14 CAPSULE | Refills: 0 | Status: SHIPPED | OUTPATIENT
Start: 2022-10-06 | End: 2022-10-13

## 2022-10-06 NOTE — PROGRESS NOTES
Subjective:       Patient ID: Shawndrosalva Watts is a 41 y.o. female.    Chief Complaint:  No chief complaint on file.      History of Present Illness  HPI  Vaginal Discharge and Irritation  Patient presents for vaginal discharge check. Sexual history reviewed with the patient. STD exposure: denies knowledge of risky exposure.  Previous history of STD:  none. Current symptoms include vaginal discharge: white, thin, watery, and malodorous, vaginal irritation: moderate.  Contraception: none. MM with     Pt presents with recurrent c/o bv. Thinks she may also be having a yeast infection. Onset of symptoms a few days ago. States that it occurs after intercourse with her  but sometimes that is not a correlation. Denies urinary symptoms. States  is circumcised. Last seen and treated for BV in may of this year.     GYN & OB History  No LMP recorded (lmp unknown). Patient has had a hysterectomy.   Date of Last Pap: No result found    OB History    Para Term  AB Living   7 6 5 1 1 6   SAB IAB Ectopic Multiple Live Births   1       6      # Outcome Date GA Lbr Girish/2nd Weight Sex Delivery Anes PTL Lv   7 SAB         FD   6 Term     F CS-LTranv   ANTONIO   5 Term     F Vag-Spont   ANTONIO   4 Term     F Vag-Spont   ANTONIO   3 Term     F Vag-Spont   ANTONIO   2 Term     F Vag-Spont   ANTONIO   1      F Vag-Spont   ANTONIO       Review of Systems  Review of Systems   Constitutional:  Negative for chills, fatigue and fever.   Genitourinary:  Positive for vaginal discharge and vaginal odor. Negative for dysmenorrhea, frequency, pelvic pain, urgency, vaginal pain and urinary incontinence.   All other systems reviewed and are negative.        Objective:    Physical Exam:   Constitutional: She is oriented to person, place, and time. She appears well-developed and well-nourished. No distress.    HENT:   Head: Normocephalic and atraumatic.    Eyes: Pupils are equal, round, and reactive to light. Conjunctivae and  EOM are normal.     Cardiovascular:  Normal rate.             Pulmonary/Chest: Effort normal.        Abdominal: Soft. She exhibits no distension. There is no abdominal tenderness. There is no rebound and no guarding. Hernia confirmed negative in the right inguinal area and confirmed negative in the left inguinal area.     Genitourinary:    Inguinal canal, right adnexa and left adnexa normal.   Rectum:      No external hemorrhoid.      Pelvic exam was performed with patient supine.   The external female genitalia was normal.   No external genitalia lesions identified,Genitalia hair distrobution normal .   Labial bartholins normal.There is no rash, tenderness, lesion or injury on the right labia. There is no rash, tenderness, lesion or injury on the left labia. Right adnexum displays no mass, no tenderness and no fullness. Left adnexum displays no mass, no tenderness and no fullness. Vaginal cuff normal.  There is vaginal discharge (white, thin, milky, malodorous. Exam otherwise unremarkable) in the vagina. No erythema, tenderness or bleeding in the vagina.    No foreign body in the vagina.      No signs of injury in the vagina.   Vagina was moist.Cervix is absent.Uterus is absent. Normal urethral meatus.Bladder findings: no bladder distention and no bladder tenderness          Musculoskeletal: Normal range of motion and moves all extremeties.      Lymphadenopathy: No inguinal adenopathy noted on the right or left side.    Neurological: She is alert and oriented to person, place, and time.    Skin: Skin is warm and dry. No rash noted. She is not diaphoretic. No erythema. No pallor.    Psychiatric: She has a normal mood and affect. Her behavior is normal. Judgment and thought content normal.        Assessment:        1. Vaginal odor    2. Bacterial vaginosis                Plan:      Vaginal odor  -     VAGINOSIS SCREEN BY DNA PROBE  -     Ureaplasma PCR Urine; Future; Expected date: 10/06/2022    Bacterial  vaginosis  -     VAGINOSIS SCREEN BY DNA PROBE  -     Genital Culture  -     metroNIDAZOLE (METROGEL) 0.75 % (37.5mg/5 gram) vaginal gel; Place 1 applicator vaginally 2 (two) times daily.  Dispense: 70 g; Refill: 0  -     clindamycin (CLEOCIN) 300 MG capsule; Take 1 capsule (300 mg total) by mouth 2 (two) times a day. for 7 days  Dispense: 14 capsule; Refill: 0    Encounter for screening mammogram for breast cancer  -     Mammo Digital Screening Bilat w/ Seth; Future; Expected date: 10/06/2022    Other orders  -     fluconazole (DIFLUCAN) 150 MG Tab; Take 1 tablet (150 mg total) by mouth once daily. for 2 doses  Dispense: 2 tablet; Refill: 0    Pt treated with PO Flagyl and Metrogel in the past. Will try alternate therapy Clindamycin 300 mg po BID x7 days this visit. Metrogel also ordered, pt will hold and use later in case clinda does not work.     Diflucan sent per pt request due to hx yeast infection after abx therapy.           Follow up if symptoms worsen or fail to improve.

## 2022-10-06 NOTE — PATIENT INSTRUCTIONS
Patient was counseled today on vaginitis prevention including :  a. avoiding feminine products such as deoderant soaps, body wash, bubble bath, douches, scented toilet paper, deoderant tampons or pads, feminine wipes, chronic pad use, etc.  b. avoiding other vulvovaginal irritants such as long hot baths, humidity, tight, synthetic clothing, chlorine and sitting around in wet bathing suits  c. wearing cotton underwear, avoiding thong underwear and no underwear to bed  d. taking showers instead of baths and use a hair dryer on cool setting afterwards to dry  e. wearing cotton to exercise and shower immediately after exercise and change clothes  f. using polyurethane condoms without spermicide if sexually active and symptoms are triggered by intercourse

## 2022-10-18 ENCOUNTER — TELEPHONE (OUTPATIENT)
Dept: PRIMARY CARE CLINIC | Facility: CLINIC | Age: 42
End: 2022-10-18
Payer: MEDICAID

## 2022-10-18 ENCOUNTER — OFFICE VISIT (OUTPATIENT)
Dept: PRIMARY CARE CLINIC | Facility: CLINIC | Age: 42
End: 2022-10-18
Payer: MEDICAID

## 2022-10-18 ENCOUNTER — HOSPITAL ENCOUNTER (OUTPATIENT)
Dept: RADIOLOGY | Facility: HOSPITAL | Age: 42
Discharge: HOME | End: 2022-10-18
Attending: NURSE PRACTITIONER
Payer: MEDICAID

## 2022-10-18 VITALS
TEMPERATURE: 97 F | HEART RATE: 64 BPM | OXYGEN SATURATION: 97 % | DIASTOLIC BLOOD PRESSURE: 82 MMHG | HEIGHT: 66 IN | SYSTOLIC BLOOD PRESSURE: 118 MMHG | BODY MASS INDEX: 29.99 KG/M2 | WEIGHT: 186.63 LBS

## 2022-10-18 DIAGNOSIS — N76.0 BACTERIAL VAGINITIS: ICD-10-CM

## 2022-10-18 DIAGNOSIS — Z11.4 SCREENING FOR HIV (HUMAN IMMUNODEFICIENCY VIRUS): ICD-10-CM

## 2022-10-18 DIAGNOSIS — Z72.89 OTHER PROBLEMS RELATED TO LIFESTYLE: ICD-10-CM

## 2022-10-18 DIAGNOSIS — K59.00 CONSTIPATION, UNSPECIFIED CONSTIPATION TYPE: ICD-10-CM

## 2022-10-18 DIAGNOSIS — Z11.59 ENCOUNTER FOR HEPATITIS C SCREENING TEST FOR LOW RISK PATIENT: ICD-10-CM

## 2022-10-18 DIAGNOSIS — Z11.3 SCREENING EXAMINATION FOR VENEREAL DISEASE: ICD-10-CM

## 2022-10-18 DIAGNOSIS — K59.00 CONSTIPATION, UNSPECIFIED CONSTIPATION TYPE: Primary | ICD-10-CM

## 2022-10-18 DIAGNOSIS — B96.89 BACTERIAL VAGINITIS: ICD-10-CM

## 2022-10-18 PROCEDURE — 81514 NFCT DS BV&VAGINITIS DNA ALG: CPT | Performed by: NURSE PRACTITIONER

## 2022-10-18 PROCEDURE — 99214 OFFICE O/P EST MOD 30 MIN: CPT | Mod: S$PBB,,, | Performed by: NURSE PRACTITIONER

## 2022-10-18 PROCEDURE — 3044F PR MOST RECENT HEMOGLOBIN A1C LEVEL <7.0%: ICD-10-PCS | Mod: CPTII,,, | Performed by: NURSE PRACTITIONER

## 2022-10-18 PROCEDURE — 3044F HG A1C LEVEL LT 7.0%: CPT | Mod: CPTII,,, | Performed by: NURSE PRACTITIONER

## 2022-10-18 PROCEDURE — 1159F MED LIST DOCD IN RCRD: CPT | Mod: CPTII,,, | Performed by: NURSE PRACTITIONER

## 2022-10-18 PROCEDURE — 74019 RADEX ABDOMEN 2 VIEWS: CPT | Mod: 26,,, | Performed by: RADIOLOGY

## 2022-10-18 PROCEDURE — 87591 N.GONORRHOEAE DNA AMP PROB: CPT | Performed by: NURSE PRACTITIONER

## 2022-10-18 PROCEDURE — 74019 RADEX ABDOMEN 2 VIEWS: CPT | Mod: TC,PN

## 2022-10-18 PROCEDURE — 99999 PR PBB SHADOW E&M-EST. PATIENT-LVL III: CPT | Mod: PBBFAC,,, | Performed by: NURSE PRACTITIONER

## 2022-10-18 PROCEDURE — 74019 XR ABDOMEN FLAT AND ERECT: ICD-10-PCS | Mod: 26,,, | Performed by: RADIOLOGY

## 2022-10-18 PROCEDURE — 99214 PR OFFICE/OUTPT VISIT, EST, LEVL IV, 30-39 MIN: ICD-10-PCS | Mod: S$PBB,,, | Performed by: NURSE PRACTITIONER

## 2022-10-18 PROCEDURE — 3079F PR MOST RECENT DIASTOLIC BLOOD PRESSURE 80-89 MM HG: ICD-10-PCS | Mod: CPTII,,, | Performed by: NURSE PRACTITIONER

## 2022-10-18 PROCEDURE — 99999 PR PBB SHADOW E&M-EST. PATIENT-LVL III: ICD-10-PCS | Mod: PBBFAC,,, | Performed by: NURSE PRACTITIONER

## 2022-10-18 PROCEDURE — 99213 OFFICE O/P EST LOW 20 MIN: CPT | Mod: PBBFAC,PN | Performed by: NURSE PRACTITIONER

## 2022-10-18 PROCEDURE — 3074F PR MOST RECENT SYSTOLIC BLOOD PRESSURE < 130 MM HG: ICD-10-PCS | Mod: CPTII,,, | Performed by: NURSE PRACTITIONER

## 2022-10-18 PROCEDURE — 87491 CHLMYD TRACH DNA AMP PROBE: CPT | Performed by: NURSE PRACTITIONER

## 2022-10-18 PROCEDURE — 1159F PR MEDICATION LIST DOCUMENTED IN MEDICAL RECORD: ICD-10-PCS | Mod: CPTII,,, | Performed by: NURSE PRACTITIONER

## 2022-10-18 PROCEDURE — 3079F DIAST BP 80-89 MM HG: CPT | Mod: CPTII,,, | Performed by: NURSE PRACTITIONER

## 2022-10-18 PROCEDURE — 3074F SYST BP LT 130 MM HG: CPT | Mod: CPTII,,, | Performed by: NURSE PRACTITIONER

## 2022-10-18 NOTE — LETTER
October 18, 2022      [facility]  [address]  [place] [address]       Patient: Fco Watts   YOB: 1980  Date of Visit: 10/18/2022    To Whom It May Concern:    Navarro Watts  was at [facility] on 10/18/2022. The patient may return to work/school on 10/18/2022 with no restrictions. If you have any questions or concerns, or if I can be of further assistance, please do not hesitate to contact me.    Sincerely,    Rosita Parra MA

## 2022-10-18 NOTE — TELEPHONE ENCOUNTER
----- Message from Elayne Gaspar sent at 10/18/2022 11:02 AM CDT -----  Contact: joseph  Patient is calling to speak with the nurse regarding excuse. Reports needing an excuse to return back to work tomorrow. Please give patient a call  back at [phone] (home)

## 2022-10-18 NOTE — PROGRESS NOTES
Subjective:       Patient ID: Fco Watts is a 42 y.o. female.    Chief Complaint: Constipation    History of Present Illness:   Fco Watts 42 y.o. female presents today with reports of constipation and address care gaps. Treatment options and alternatives were discussed with the patient. Patient provided opportunity to ask additional questions.  All questions were answered. Voices understanding and acceptance of this advice. Instructed to call back if any further questions or concerns.      Past Medical History:   Diagnosis Date    Abnormal Pap smear of cervix     ADD (attention deficit disorder) without hyperactivity 01/02/2019    Prescribed Adderall 10 1 po qd by her previous PCP, Dr. Dorita Cota, which she takes prn inattention while studying    Attention deficit hyperactivity disorder (ADHD), predominantly inattentive type 5/3/2019    Dyslipidemia     low HDL    Thyroid disease     h/o hypothyroidism    Vitamin D deficiency      Family History   Problem Relation Age of Onset    Breast cancer Mother 71    Hypertension Mother     Colon cancer Father 60    Ovarian cancer Neg Hx      Social History     Socioeconomic History    Marital status: Single   Tobacco Use    Smoking status: Never     Passive exposure: Never    Smokeless tobacco: Never   Substance and Sexual Activity    Alcohol use: Yes     Alcohol/week: 0.0 - 1.0 standard drinks    Drug use: No    Sexual activity: Yes     Partners: Male     Birth control/protection: None     Comment:    Social History Narrative    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); kids are 23 years, who is an RN; wt goal 150 lbs         Breakfast: Skips (previously 5%, previously 25%: cereal or McDonalds: sausage biscuit or steak McMuffin (previously meal replacement shake); unsweetened lemon water (previously Cokes)    Lunch: 75% Hamberger or pizza or fried chicken (previously grilled chicken or salad); 75%  burger & chip (previous 25%); water    Dinner: Home cooked: baked chicken or hamberger steak & gravy (occasionally 1 slice pizza or fried chicken); cranberry juice or soda or water    Snacks: Candy bar 2-3x/wk (previously ice cream 1x/d or chips 2x/wk or previoulsy Star crunch pie)     Eats out: 3x/m (previously 5x/wk)    Water: 36 oz/d (previously 16 oz/d, previously 48-64)    PA: None (previously 3x/wk; each 1 hour)    Goal weight is 150 lbs     Social Determinants of Health     Financial Resource Strain: Low Risk     Difficulty of Paying Living Expenses: Not hard at all   Food Insecurity: No Food Insecurity    Worried About Running Out of Food in the Last Year: Never true    Ran Out of Food in the Last Year: Never true   Transportation Needs: No Transportation Needs    Lack of Transportation (Medical): No    Lack of Transportation (Non-Medical): No   Physical Activity: Insufficiently Active    Days of Exercise per Week: 1 day    Minutes of Exercise per Session: 30 min   Stress: No Stress Concern Present    Feeling of Stress : Not at all   Social Connections: Socially Integrated    Frequency of Communication with Friends and Family: More than three times a week    Frequency of Social Gatherings with Friends and Family: More than three times a week    Attends Uats[employer] Services: More than 4 times per year    Active Member of Clubs or Organizations: Yes    Attends Club or Organization Meetings: More than 4 times per year    Marital Status:    Housing Stability: Unknown    Unable to Pay for Housing in the Last Year: No    Unstable Housing in the Last Year: No     Outpatient Encounter Medications as of 10/18/2022   Medication Sig Dispense Refill    linaCLOtide (LINZESS) 145 mcg Cap capsule Take 1 capsule (145 mcg total) by mouth before breakfast. 30 capsule 3    metroNIDAZOLE (METROGEL) 0.75 % (37.5mg/5 gram) vaginal gel Place 1 applicator vaginally 2 (two) times daily. (Patient not taking: Reported on  "10/18/2022) 70 g 0    metroNIDAZOLE (METROGEL) 0.75 % vaginal gel Use as directed (Patient not taking: Reported on 10/18/2022) 70 g 2    promethazine-dextromethorphan (PROMETHAZINE-DM) 6.25-15 mg/5 mL Syrp Take 5 mLs by mouth every 6 (six) hours as needed (cough). (Patient not taking: Reported on 10/18/2022) 140 mL 0    spironolactone (ALDACTONE) 50 MG tablet Take 50 mg by mouth 2 (two) times daily.      tretinoin (RETIN-A) 0.025 % cream Apply a thin coat to the face every night.       No facility-administered encounter medications on file as of 10/18/2022.       Review of Systems   Constitutional:  Negative for appetite change, chills and fever.   HENT:  Negative for ear pain, sinus pressure, sore throat and trouble swallowing.    Eyes:  Negative for visual disturbance.   Respiratory:  Negative for shortness of breath.    Cardiovascular:  Negative for chest pain.   Gastrointestinal:  Positive for constipation. Negative for abdominal pain, diarrhea, nausea and vomiting.   Endocrine: Negative for cold intolerance, polyphagia and polyuria.   Genitourinary:  Negative for decreased urine volume and dysuria.   Musculoskeletal:  Negative for back pain.   Skin:  Negative for rash.   Allergic/Immunologic: Negative for environmental allergies and food allergies.   Neurological:  Negative for dizziness, tremors, weakness and numbness.   Hematological:  Does not bruise/bleed easily.   Psychiatric/Behavioral:  Negative for confusion and hallucinations. The patient is not nervous/anxious and is not hyperactive.    All other systems reviewed and are negative.    Objective:      /82 (BP Location: Left arm, Patient Position: Sitting, BP Method: Medium (Manual))   Pulse 64   Temp 97 °F (36.1 °C) (Temporal)   Ht 5' 6" (1.676 m)   Wt 84.6 kg (186 lb 9.6 oz)   LMP  (LMP Unknown) Comment: Partial Hysterectomy   SpO2 97%   BMI 30.12 kg/m²   Physical Exam  Vitals and nursing note reviewed.   Constitutional:       General: She " is not in acute distress.     Appearance: Normal appearance. She is normal weight. She is not ill-appearing or toxic-appearing.   Cardiovascular:      Rate and Rhythm: Normal rate and regular rhythm.      Pulses: Normal pulses.      Heart sounds: Normal heart sounds.   Pulmonary:      Effort: Pulmonary effort is normal.      Breath sounds: Normal breath sounds.   Abdominal:      Palpations: Abdomen is soft.   Neurological:      Mental Status: She is alert.       Results for orders placed or performed in visit on 10/18/22   C. trachomatis/N. gonorrhoeae by AMP DNA    Specimen: Genital   Result Value Ref Range    Chlamydia, Amplified DNA Not Detected Not Detected    N gonorrhoeae, amplified DNA Not Detected Not Detected   VAGINOSIS SCREEN BY DNA PROBE    Specimen: Vagina; Genital   Result Value Ref Range    Trichomonas vaginalis Negative Negative    Candida sp Negative Negative    Candida glabrata DNA Negative Negative    Candida krusei DNA Negative Negative    Bacterial vaginosis DNA Positive (A) Negative     Assessment:       1. Constipation, unspecified constipation type    2. Bacterial vaginitis    3. Encounter for hepatitis C screening test for low risk patient    4. Screening for HIV (human immunodeficiency virus)    5. Other problems related to lifestyle    6. Screening examination for venereal disease        Plan:   Diagnoses and all orders for this visit:    Constipation, unspecified constipation type  -     X-Ray Abdomen Flat And Erect; Future    Bacterial vaginitis    Encounter for hepatitis C screening test for low risk patient  -     Hepatitis C Antibody; Future    Screening for HIV (human immunodeficiency virus)  -     HIV 1/2 Ag/Ab (4th Gen); Future    Other problems related to lifestyle    Screening examination for venereal disease  -     RPR; Future  -     C. trachomatis/N. gonorrhoeae by AMP DNA  -     VAGINOSIS SCREEN BY DNA PROBE    Other orders  -     linaCLOtide (LINZESS) 145 mcg Cap capsule;  Take 1 capsule (145 mcg total) by mouth before breakfast.            [facility]   [address]  [place] [address]  Office [phone]  Fax [phone]

## 2022-10-18 NOTE — TELEPHONE ENCOUNTER
I returned the patient call and the patient states that she wants another excuse due to medication that she takes, however patient states that she will wait to later.

## 2022-10-19 LAB
BACTERIAL VAGINOSIS DNA: POSITIVE
C TRACH DNA SPEC QL NAA+PROBE: NOT DETECTED
CANDIDA GLABRATA DNA: NEGATIVE
CANDIDA KRUSEI DNA: NEGATIVE
CANDIDA RRNA VAG QL PROBE: NEGATIVE
N GONORRHOEA DNA SPEC QL NAA+PROBE: NOT DETECTED
T VAGINALIS RRNA GENITAL QL PROBE: NEGATIVE

## 2022-11-29 ENCOUNTER — TELEPHONE (OUTPATIENT)
Dept: PRIMARY CARE CLINIC | Facility: CLINIC | Age: 42
End: 2022-11-29
Payer: MEDICAID

## 2022-11-29 NOTE — TELEPHONE ENCOUNTER
Patient called regarding appointment request. Patient stated appointment is for a work physical, patient doesn't have a form from job stating what is needed. Patient advised to see if a form or list of what is needed can be provided by patient job. Patient voiced understanding and will update via [employer].    ----- Message from Mathieu Cooper sent at 11/29/2022 11:10 AM CST -----  Contact: Fco  Type:  Sooner Apoointment Request    Caller is requesting a sooner appointment.  Caller declined first available appointment listed below.  Caller will not accept being placed on the waitlist and is requesting a message be sent to doctor.  Name of Caller:Fco  When is the first available appointment?1/30  Symptoms:Work Physical   Would the patient rather a call back or a response via [employer]? Call back   Best Call Back Number:[phone]  Additional Information:       Thanks  CF

## 2023-03-13 ENCOUNTER — PATIENT MESSAGE (OUTPATIENT)
Dept: PAIN MEDICINE | Facility: CLINIC | Age: 43
End: 2023-03-13
Payer: MEDICAID

## 2023-03-17 ENCOUNTER — PATIENT MESSAGE (OUTPATIENT)
Dept: OBSTETRICS AND GYNECOLOGY | Facility: CLINIC | Age: 43
End: 2023-03-17
Payer: MEDICAID

## 2023-03-17 DIAGNOSIS — B96.89 BACTERIAL VAGINOSIS: ICD-10-CM

## 2023-03-17 DIAGNOSIS — N76.0 BACTERIAL VAGINOSIS: ICD-10-CM

## 2023-03-17 RX ORDER — METRONIDAZOLE 7.5 MG/G
1 GEL VAGINAL NIGHTLY
Qty: 70 G | Refills: 0 | Status: SHIPPED | OUTPATIENT
Start: 2023-03-17 | End: 2023-03-27

## 2023-03-20 ENCOUNTER — TELEPHONE (OUTPATIENT)
Dept: OBSTETRICS AND GYNECOLOGY | Facility: CLINIC | Age: 43
End: 2023-03-20

## 2023-03-20 NOTE — TELEPHONE ENCOUNTER
----- Message from Stu Gordon sent at 3/20/2023  1:32 PM CDT -----  Contact: Patient  Type:  Patient Call          Who Called: patient         Does the patient know what this is regarding?: Requesting a call back to have a same day appt scheduled ;please advise           Would the patient rather a call back or a response via [facility]?call          Best Call Back Number:[phone]             Additional Information:

## 2023-04-03 ENCOUNTER — TELEPHONE (OUTPATIENT)
Dept: PRIMARY CARE CLINIC | Facility: CLINIC | Age: 43
End: 2023-04-03

## 2023-04-03 NOTE — TELEPHONE ENCOUNTER
----- Message from Amaya Hsieh sent at 4/3/2023  1:01 PM CDT -----  Contact: [phone]  Type:  Sooner Apoointment Request    Caller is requesting a sooner appointment.  Caller declined first available appointment listed below.  Caller will not accept being placed on the waitlist and is requesting a message be sent to doctor.  Name of Caller:Fco   When is the first available appointment?05/29/2023  Symptoms:swollen foot / leg pian   Would the patient rather a call back or a response via [facility]? Call back/[employer]   Best Call Back Number:[phone]   Additional Information:

## 2023-04-03 NOTE — TELEPHONE ENCOUNTER
Returned call to patient to inform that there are no available in clinic appointments at this time, offered virtual. Advised to report to nearest ER/UC for immediate evaluation and treatment. Patient preferred a virtual appointment. Schedule patient on 4/4/23 at 7:20 am. She voiced understanding.

## 2023-04-04 ENCOUNTER — TELEPHONE (OUTPATIENT)
Dept: PRIMARY CARE CLINIC | Facility: CLINIC | Age: 43
End: 2023-04-04

## 2023-04-04 ENCOUNTER — HOSPITAL ENCOUNTER (OUTPATIENT)
Dept: RADIOLOGY | Facility: HOSPITAL | Age: 43
Discharge: HOME | End: 2023-04-04
Attending: NURSE PRACTITIONER
Payer: MEDICAID

## 2023-04-04 ENCOUNTER — OFFICE VISIT (OUTPATIENT)
Dept: PRIMARY CARE CLINIC | Facility: CLINIC | Age: 43
End: 2023-04-04
Payer: MEDICAID

## 2023-04-04 DIAGNOSIS — R60.9 EDEMA, UNSPECIFIED TYPE: ICD-10-CM

## 2023-04-04 DIAGNOSIS — Z12.4 SCREENING FOR CERVICAL CANCER: ICD-10-CM

## 2023-04-04 DIAGNOSIS — Z11.4 SCREENING FOR HIV (HUMAN IMMUNODEFICIENCY VIRUS): ICD-10-CM

## 2023-04-04 DIAGNOSIS — Z13.220 ENCOUNTER FOR LIPID SCREENING FOR CARDIOVASCULAR DISEASE: ICD-10-CM

## 2023-04-04 DIAGNOSIS — M79.661 PAIN IN BOTH LOWER LEGS: ICD-10-CM

## 2023-04-04 DIAGNOSIS — Z13.6 ENCOUNTER FOR LIPID SCREENING FOR CARDIOVASCULAR DISEASE: ICD-10-CM

## 2023-04-04 DIAGNOSIS — M79.661 PAIN IN BOTH LOWER LEGS: Primary | ICD-10-CM

## 2023-04-04 DIAGNOSIS — Z86.39 HISTORY OF METABOLIC AND NUTRITIONAL DISORDER: ICD-10-CM

## 2023-04-04 DIAGNOSIS — Z00.00 GENERAL MEDICAL EXAM: ICD-10-CM

## 2023-04-04 DIAGNOSIS — M79.662 PAIN IN BOTH LOWER LEGS: Primary | ICD-10-CM

## 2023-04-04 DIAGNOSIS — M79.662 PAIN IN BOTH LOWER LEGS: ICD-10-CM

## 2023-04-04 PROCEDURE — 93970 EXTREMITY STUDY: CPT | Mod: TC,PO

## 2023-04-04 PROCEDURE — 99214 OFFICE O/P EST MOD 30 MIN: CPT | Mod: 95,,, | Performed by: NURSE PRACTITIONER

## 2023-04-04 PROCEDURE — 93970 US LOWER EXTREMITY VEINS BILATERAL: ICD-10-PCS | Mod: 26,,, | Performed by: RADIOLOGY

## 2023-04-04 PROCEDURE — 99214 PR OFFICE/OUTPT VISIT, EST, LEVL IV, 30-39 MIN: ICD-10-PCS | Mod: 95,,, | Performed by: NURSE PRACTITIONER

## 2023-04-04 PROCEDURE — 93970 EXTREMITY STUDY: CPT | Mod: 26,,, | Performed by: RADIOLOGY

## 2023-04-04 NOTE — TELEPHONE ENCOUNTER
Pt called to schedule labs and US. Pt has been scheduled, pt voiced understanding.  ----- Message from Christina Vaz sent at 4/4/2023  1:01 PM CDT -----  Contact: Pt [phone]  Patient is returning a phone call.  Who left a message for the patient: Sepideh Velázquez MA   Does patient know what this is regarding:  labs  Would you like a call back, or a response through your [facility] portal?:   Call back  Comments:     Please call and advise.    Thank You

## 2023-04-04 NOTE — PROGRESS NOTES
Subjective:       Patient ID: Fco Watts is a 42 y.o. female.    Chief Complaint: BLE Swelling and Pain  The patient location is:[place], [place]    Visit type: audiovisual-Synchronous      Face to Face time with patient: 11 min  12 minutes of total time spent on the encounter, which includes face to face time and non-face to face time preparing to see the patient (eg, review of tests), Obtaining and/or reviewing separately obtained history, Documenting clinical information in the electronic or other health record, Independently interpreting results (not separately reported) and communicating results to the patient/family/caregiver, or Care coordination (not separately reported).         Each patient to whom he or she provides medical services by telemedicine is:  (1) informed of the relationship between the physician and patient and the respective role of any other health care provider with respect to management of the patient; and (2) notified that he or she may decline to receive medical services by telemedicine and may withdraw from such care at any time.       History of Present Illness:   Fco Watts 42 y.o. female presents today with reports swelling to BLE and pain in the back and top of legs. Patient reports that symptoms improve with elevating legs but the fluid returns shortly after. Will obtain labs, ultrasound and have patient report to ER if symptoms worsen or no improvement. Treatment options and alternatives were discussed with the patient. Patient provided opportunity to ask additional questions.  All questions were answered. Voices understanding and acceptance of this advice. Instructed to call back if any further questions or concerns. I had the opportunity to review patient's medical records including care everywhere, labs, and medication reconciliation to determine medical decision making.       Past Medical History:   Diagnosis Date    Abnormal Pap smear of cervix     ADD  (attention deficit disorder) without hyperactivity 01/02/2019    Prescribed Adderall 10 1 po qd by her previous PCP, Dr. Dorita Cota, which she takes prn inattention while studying    Attention deficit hyperactivity disorder (ADHD), predominantly inattentive type 5/3/2019    Dyslipidemia     low HDL    Thyroid disease     h/o hypothyroidism    Vitamin D deficiency      Family History   Problem Relation Age of Onset    Breast cancer Mother 71    Hypertension Mother     Colon cancer Father 60    Ovarian cancer Neg Hx      Social History     Socioeconomic History    Marital status: Single   Tobacco Use    Smoking status: Never     Passive exposure: Never    Smokeless tobacco: Never   Substance and Sexual Activity    Alcohol use: Yes     Alcohol/week: 0.0 - 1.0 standard drinks    Drug use: No    Sexual activity: Yes     Partners: Male     Birth control/protection: None     Comment:    Social History Narrative    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); 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 years, who is an RN; wt goal 150 lbs         Breakfast: Skips (previously 5%, previously 25%: cereal or McDonalds: sausage biscuit or steak McMuffin (previously meal replacement shake); unsweetened lemon water (previously Cokes)    Lunch: 75% Hamberger or pizza or fried chicken (previously grilled chicken or salad); 75% burger & chip (previous 25%); water    Dinner: Home cooked: baked chicken or hamberger steak & gravy (occasionally 1 slice pizza or fried chicken); cranberry juice or soda or water    Snacks: Candy bar 2-3x/wk (previously ice cream 1x/d or chips 2x/wk or previoulsy Star crunch pie)     Eats out: 3x/m (previously 5x/wk)    Water: 36 oz/d (previously 16 oz/d, previously 48-64)    PA: None (previously 3x/wk; each 1 hour)    Goal weight is 150 lbs     Social Determinants of Health     Financial Resource Strain: Low Risk     Difficulty of Paying Living Expenses: Not hard at all   Food Insecurity:  No Food Insecurity    Worried About Running Out of Food in the Last Year: Never true    Ran Out of Food in the Last Year: Never true   Transportation Needs: No Transportation Needs    Lack of Transportation (Medical): No    Lack of Transportation (Non-Medical): No   Physical Activity: Insufficiently Active    Days of Exercise per Week: 1 day    Minutes of Exercise per Session: 30 min   Stress: No Stress Concern Present    Feeling of Stress : Not at all   Social Connections: Socially Integrated    Frequency of Communication with Friends and Family: More than three times a week    Frequency of Social Gatherings with Friends and Family: More than three times a week    Attends Restoration Services: More than 4 times per year    Active Member of Clubs or Organizations: Yes    Attends Club or Organization Meetings: More than 4 times per year    Marital Status:    Housing Stability: Unknown    Unable to Pay for Housing in the Last Year: No    Unstable Housing in the Last Year: No     Outpatient Encounter Medications as of 4/4/2023   Medication Sig Dispense Refill    promethazine-dextromethorphan (PROMETHAZINE-DM) 6.25-15 mg/5 mL Syrp Take 5 mLs by mouth every 6 (six) hours as needed (cough). (Patient not taking: Reported on 10/18/2022) 140 mL 0    spironolactone (ALDACTONE) 50 MG tablet Take 50 mg by mouth 2 (two) times daily.       No facility-administered encounter medications on file as of 4/4/2023.       Review of Systems   Constitutional:  Positive for activity change. Negative for unexpected weight change.   HENT:  Negative for hearing loss, rhinorrhea and trouble swallowing.    Eyes:  Negative for discharge and visual disturbance.   Respiratory:  Negative for chest tightness and wheezing.    Cardiovascular:  Negative for chest pain and palpitations.   Gastrointestinal:  Negative for blood in stool, constipation, diarrhea and vomiting.   Endocrine: Negative for polydipsia and polyuria.   Genitourinary:  Negative  for difficulty urinating, dysuria, hematuria and menstrual problem.   Musculoskeletal:  Positive for arthralgias (BLE pain and swelling). Negative for joint swelling and neck pain.   Neurological:  Negative for weakness and headaches.   Psychiatric/Behavioral:  Negative for confusion and dysphoric mood.      Objective:      LMP  (LMP Unknown) Comment: Partial Hysterectomy   Physical Exam  Musculoskeletal:      Right lower leg: Swelling present.      Left lower leg: Swelling present.   Neurological:      Mental Status: She is alert.       Results for orders placed or performed in visit on 10/18/22   Hepatitis C Antibody   Result Value Ref Range    Hepatitis C Ab Non-reactive Non-reactive   RPR   Result Value Ref Range    RPR Non-reactive Non-reactive   HIV 1/2 Ag/Ab (4th Gen)   Result Value Ref Range    HIV 1/2 Ag/Ab Non-reactive Non-reactive     Assessment:       1. Pain in both lower legs    2. Edema, unspecified type    3. Encounter for lipid screening for cardiovascular disease    4. General medical exam    5. History of metabolic and nutritional disorder    6. Screening for HIV (human immunodeficiency virus)    7. Screening for cervical cancer        Plan:   Diagnoses and all orders for this visit:    Pain in both lower legs  -     US Extremity Non Vascular Limited Bilat; Future    Edema, unspecified type  -     BNP; Future  -     US Extremity Non Vascular Limited Bilat; Future    Encounter for lipid screening for cardiovascular disease  -     Lipid Panel; Future    General medical exam  -     CBC Auto Differential; Future  -     Comprehensive Metabolic Panel; Future    History of metabolic and nutritional disorder  -     Hemoglobin A1C; Future  -     TSH; Future  -     T3, Free; Future  -     T4, Free; Future    Screening for HIV (human immunodeficiency virus)  -     HIV 1/2 Ag/Ab (4th Gen); Future    Screening for cervical cancer  -     Ambulatory referral/consult to Gynecology; Future              [facility]   [address]  [place] [address]  Office [phone]  Fax [phone]

## 2023-04-06 ENCOUNTER — PATIENT MESSAGE (OUTPATIENT)
Dept: PRIMARY CARE CLINIC | Facility: CLINIC | Age: 43
End: 2023-04-06

## 2023-04-10 ENCOUNTER — TELEPHONE (OUTPATIENT)
Dept: PRIMARY CARE CLINIC | Facility: CLINIC | Age: 43
End: 2023-04-10

## 2023-04-10 DIAGNOSIS — R60.9 EDEMA, UNSPECIFIED TYPE: Primary | ICD-10-CM

## 2023-04-10 RX ORDER — POTASSIUM CHLORIDE 20 MEQ/1
20 TABLET, EXTENDED RELEASE ORAL 2 TIMES DAILY
Qty: 8 TABLET | Refills: 0 | Status: SHIPPED | OUTPATIENT
Start: 2023-04-10 | End: 2023-04-14

## 2023-04-10 RX ORDER — FUROSEMIDE 20 MG/1
20 TABLET ORAL 2 TIMES DAILY
Qty: 8 TABLET | Refills: 0 | Status: SHIPPED | OUTPATIENT
Start: 2023-04-10 | End: 2023-09-18

## 2023-04-10 NOTE — TELEPHONE ENCOUNTER
Pt called regarding swelling, pt informed provider sent medication to pharmacy. Pt voiced understanding.  ----- Message from Stephanie King sent at 4/10/2023  3:29 PM CDT -----  Contact: [phone] Patient  Patient is returning a phone call.  Who left a message for the patient: Sepideh Velázquez   Does patient know what this is regarding:  swelling  Would you like a call back, or a response through your [facility] portal?:   call back  Comments:

## 2023-04-18 ENCOUNTER — HOSPITAL ENCOUNTER (OUTPATIENT)
Dept: RADIOLOGY | Facility: HOSPITAL | Age: 43
Discharge: HOME | End: 2023-04-18
Payer: MEDICAID

## 2023-04-18 VITALS — BODY MASS INDEX: 29.97 KG/M2 | HEIGHT: 66 IN | WEIGHT: 186.5 LBS

## 2023-04-18 DIAGNOSIS — Z12.31 ENCOUNTER FOR SCREENING MAMMOGRAM FOR BREAST CANCER: ICD-10-CM

## 2023-04-18 PROCEDURE — 77063 BREAST TOMOSYNTHESIS BI: CPT | Mod: 26,,, | Performed by: RADIOLOGY

## 2023-04-18 PROCEDURE — 77067 SCR MAMMO BI INCL CAD: CPT | Mod: TC

## 2023-04-18 PROCEDURE — 77067 SCR MAMMO BI INCL CAD: CPT | Mod: 26,,, | Performed by: RADIOLOGY

## 2023-04-18 PROCEDURE — 77063 MAMMO DIGITAL SCREENING BILAT WITH TOMO: ICD-10-PCS | Mod: 26,,, | Performed by: RADIOLOGY

## 2023-04-18 PROCEDURE — 77067 MAMMO DIGITAL SCREENING BILAT WITH TOMO: ICD-10-PCS | Mod: 26,,, | Performed by: RADIOLOGY

## 2023-09-14 ENCOUNTER — OFFICE VISIT (OUTPATIENT)
Dept: OBSTETRICS AND GYNECOLOGY | Facility: CLINIC | Age: 43
End: 2023-09-14
Payer: COMMERCIAL

## 2023-09-14 VITALS
DIASTOLIC BLOOD PRESSURE: 62 MMHG | HEIGHT: 66 IN | WEIGHT: 194 LBS | BODY MASS INDEX: 31.18 KG/M2 | SYSTOLIC BLOOD PRESSURE: 100 MMHG

## 2023-09-14 DIAGNOSIS — Z12.4 SCREENING FOR CERVICAL CANCER: ICD-10-CM

## 2023-09-14 DIAGNOSIS — N89.8 VAGINAL DISCHARGE: Primary | ICD-10-CM

## 2023-09-14 PROCEDURE — 3044F PR MOST RECENT HEMOGLOBIN A1C LEVEL <7.0%: ICD-10-PCS | Mod: CPTII,S$GLB,,

## 2023-09-14 PROCEDURE — 3078F PR MOST RECENT DIASTOLIC BLOOD PRESSURE < 80 MM HG: ICD-10-PCS | Mod: CPTII,S$GLB,,

## 2023-09-14 PROCEDURE — 1159F MED LIST DOCD IN RCRD: CPT | Mod: CPTII,S$GLB,,

## 2023-09-14 PROCEDURE — 3078F DIAST BP <80 MM HG: CPT | Mod: CPTII,S$GLB,,

## 2023-09-14 PROCEDURE — 99999 PR PBB SHADOW E&M-EST. PATIENT-LVL III: ICD-10-PCS | Mod: PBBFAC,,,

## 2023-09-14 PROCEDURE — 99212 PR OFFICE/OUTPT VISIT, EST, LEVL II, 10-19 MIN: ICD-10-PCS | Mod: S$GLB,,,

## 2023-09-14 PROCEDURE — 1159F PR MEDICATION LIST DOCUMENTED IN MEDICAL RECORD: ICD-10-PCS | Mod: CPTII,S$GLB,,

## 2023-09-14 PROCEDURE — 81514 NFCT DS BV&VAGINITIS DNA ALG: CPT

## 2023-09-14 PROCEDURE — 3008F PR BODY MASS INDEX (BMI) DOCUMENTED: ICD-10-PCS | Mod: CPTII,S$GLB,,

## 2023-09-14 PROCEDURE — 3008F BODY MASS INDEX DOCD: CPT | Mod: CPTII,S$GLB,,

## 2023-09-14 PROCEDURE — 3074F PR MOST RECENT SYSTOLIC BLOOD PRESSURE < 130 MM HG: ICD-10-PCS | Mod: CPTII,S$GLB,,

## 2023-09-14 PROCEDURE — 3074F SYST BP LT 130 MM HG: CPT | Mod: CPTII,S$GLB,,

## 2023-09-14 PROCEDURE — 99212 OFFICE O/P EST SF 10 MIN: CPT | Mod: S$GLB,,,

## 2023-09-14 PROCEDURE — 3044F HG A1C LEVEL LT 7.0%: CPT | Mod: CPTII,S$GLB,,

## 2023-09-14 PROCEDURE — 99999 PR PBB SHADOW E&M-EST. PATIENT-LVL III: CPT | Mod: PBBFAC,,,

## 2023-09-14 NOTE — PROGRESS NOTES
Subjective:      Patient ID: Fco Watts is a 42 y.o. female.    Chief Complaint:  Vaginal Discharge and Vaginal Odor      History of Present Illness  HPI    Patient c/o vaginal discharge, with odor, recurring, this episode has been 4-5 days. Hysterectomy or benign indications. Last pap 2017.     GYN & OB History  No LMP recorded (lmp unknown). Patient has had a hysterectomy.   Date of Last Pap: No result found    OB History    Para Term  AB Living   7 6 5 1 1 6   SAB IAB Ectopic Multiple Live Births   1       6      # Outcome Date GA Lbr Girish/2nd Weight Sex Delivery Anes PTL Lv   7 SAB         FD   6 Term     F CS-LTranv   ANTONIO   5 Term     F Vag-Spont   ANTONIO   4 Term     F Vag-Spont   ANTONIO   3 Term     F Vag-Spont   ANTONIO   2 Term     F Vag-Spont   ANTONIO   1      F Vag-Spont   ANTONIO       Review of Systems  Review of Systems   Constitutional:  Negative for chills, fatigue and fever.   Genitourinary:  Positive for vaginal discharge and vaginal odor. Negative for dysmenorrhea, frequency, pelvic pain, urgency, vaginal pain and urinary incontinence.   All other systems reviewed and are negative.         Objective:     Physical Exam:   Constitutional: She is oriented to person, place, and time. She appears well-developed and well-nourished. No distress.    HENT:   Head: Normocephalic and atraumatic.    Eyes: Pupils are equal, round, and reactive to light. Conjunctivae and EOM are normal.     Cardiovascular:  Normal rate.             Pulmonary/Chest: Effort normal.        Abdominal: Soft. She exhibits no distension. There is no abdominal tenderness. There is no rebound and no guarding. Hernia confirmed negative in the right inguinal area and confirmed negative in the left inguinal area.     Genitourinary:    Inguinal canal, right adnexa and left adnexa normal.   Rectum:      No external hemorrhoid.      Pelvic exam was performed with patient supine.   The external female genitalia was normal.   No  external genitalia lesions identified,Genitalia hair distrobution normal .   Labial bartholins normal.There is no rash, tenderness, lesion or injury on the right labia. There is no rash, tenderness, lesion or injury on the left labia. Right adnexum displays no mass, no tenderness and no fullness. Left adnexum displays no mass, no tenderness and no fullness. There is vaginal discharge (thick, white, creamy) in the vagina. No erythema, tenderness or bleeding in the vagina.    No foreign body in the vagina.      No signs of injury in the vagina.   Cervix is absent.Uterus is absent. Normal urethral meatus.Bladder findings: no bladder distention and no bladder tenderness          Musculoskeletal: Normal range of motion and moves all extremeties.      Lymphadenopathy: No inguinal adenopathy noted on the right or left side.    Neurological: She is alert and oriented to person, place, and time.    Skin: Skin is warm and dry. No rash noted. She is not diaphoretic. No erythema. No pallor.    Psychiatric: She has a normal mood and affect. Her behavior is normal. Judgment and thought content normal.         Assessment:     1. Vaginal discharge    2. Screening for cervical cancer               Plan:      Vaginal discharge  -     VAGINOSIS SCREEN BY DNA PROBE    Screening for cervical cancer  -     Ambulatory referral/consult to Gynecology    Pt no longer needs pap smears.  Needs a pelvic exam q 1-2 years.  Advised on annual mammogram.    May use over the counter vaginal suppositories for day to day relief from vaginal dryness and irritation. Brands include:    Replens  RepHresh  [place] moisture beads  [employer] brand vaginal suppositories with hyaluronic acid     These can be used 2-3 times per week, inserted into the vagina. Wear a pad or panty liner to catch any leaking.        Cultures and labs to follow, treat as indicated.

## 2023-09-15 ENCOUNTER — PATIENT MESSAGE (OUTPATIENT)
Dept: OBSTETRICS AND GYNECOLOGY | Facility: CLINIC | Age: 43
End: 2023-09-15
Payer: COMMERCIAL

## 2023-09-15 ENCOUNTER — TELEPHONE (OUTPATIENT)
Dept: OBSTETRICS AND GYNECOLOGY | Facility: CLINIC | Age: 43
End: 2023-09-15
Payer: COMMERCIAL

## 2023-09-15 DIAGNOSIS — N89.8 VAGINAL ODOR: Primary | ICD-10-CM

## 2023-09-15 DIAGNOSIS — N89.8 VAGINAL DISCHARGE: ICD-10-CM

## 2023-09-15 RX ORDER — METRONIDAZOLE 500 MG/1
500 TABLET ORAL 2 TIMES DAILY
Qty: 14 TABLET | Refills: 0 | Status: SHIPPED | OUTPATIENT
Start: 2023-09-15 | End: 2023-09-22

## 2023-09-15 RX ORDER — FLUCONAZOLE 150 MG/1
150 TABLET ORAL
Qty: 2 TABLET | Refills: 0 | Status: SHIPPED | OUTPATIENT
Start: 2023-09-15 | End: 2023-09-19

## 2023-09-15 NOTE — TELEPHONE ENCOUNTER
----- Message from Berta Long LPN sent at 9/15/2023 10:29 AM CDT -----  Contact: joseph  Good Morning    Pt called stating she was waiting for Diflucan and Flagyl to be called into pharmacy.     Camille  ----- Message -----  From: Saji Flores  Sent: 9/15/2023  10:11 AM CDT  To: Corina Figueroa Staff    Patient stated that she went to pharmacy to  medication but medicine was not called in. Please call her back at [phone]. Pt would like to have medicine before weekend starts.         Thanks  DD

## 2023-09-19 ENCOUNTER — OFFICE VISIT (OUTPATIENT)
Dept: INTERNAL MEDICINE | Facility: CLINIC | Age: 43
End: 2023-09-19
Payer: COMMERCIAL

## 2023-09-19 VITALS
BODY MASS INDEX: 30.72 KG/M2 | WEIGHT: 191.13 LBS | SYSTOLIC BLOOD PRESSURE: 110 MMHG | TEMPERATURE: 99 F | HEART RATE: 85 BPM | HEIGHT: 66 IN | OXYGEN SATURATION: 96 % | DIASTOLIC BLOOD PRESSURE: 70 MMHG

## 2023-09-19 DIAGNOSIS — J40 BRONCHITIS: Primary | ICD-10-CM

## 2023-09-19 DIAGNOSIS — R05.1 ACUTE COUGH: ICD-10-CM

## 2023-09-19 LAB
CTP QC/QA: YES
SARS-COV-2 RDRP RESP QL NAA+PROBE: NEGATIVE

## 2023-09-19 PROCEDURE — 87635: ICD-10-PCS | Mod: QW,S$GLB,, | Performed by: NURSE PRACTITIONER

## 2023-09-19 PROCEDURE — 1160F RVW MEDS BY RX/DR IN RCRD: CPT | Mod: CPTII,S$GLB,, | Performed by: NURSE PRACTITIONER

## 2023-09-19 PROCEDURE — 3074F PR MOST RECENT SYSTOLIC BLOOD PRESSURE < 130 MM HG: ICD-10-PCS | Mod: CPTII,S$GLB,, | Performed by: NURSE PRACTITIONER

## 2023-09-19 PROCEDURE — 3008F PR BODY MASS INDEX (BMI) DOCUMENTED: ICD-10-PCS | Mod: CPTII,S$GLB,, | Performed by: NURSE PRACTITIONER

## 2023-09-19 PROCEDURE — 1160F PR REVIEW ALL MEDS BY PRESCRIBER/CLIN PHARMACIST DOCUMENTED: ICD-10-PCS | Mod: CPTII,S$GLB,, | Performed by: NURSE PRACTITIONER

## 2023-09-19 PROCEDURE — 1159F MED LIST DOCD IN RCRD: CPT | Mod: CPTII,S$GLB,, | Performed by: NURSE PRACTITIONER

## 2023-09-19 PROCEDURE — 3008F BODY MASS INDEX DOCD: CPT | Mod: CPTII,S$GLB,, | Performed by: NURSE PRACTITIONER

## 2023-09-19 PROCEDURE — 99213 OFFICE O/P EST LOW 20 MIN: CPT | Mod: S$GLB,,, | Performed by: NURSE PRACTITIONER

## 2023-09-19 PROCEDURE — 99999 PR PBB SHADOW E&M-EST. PATIENT-LVL IV: CPT | Mod: PBBFAC,,, | Performed by: NURSE PRACTITIONER

## 2023-09-19 PROCEDURE — 87635 SARS-COV-2 COVID-19 AMP PRB: CPT | Mod: QW,S$GLB,, | Performed by: NURSE PRACTITIONER

## 2023-09-19 PROCEDURE — 3078F PR MOST RECENT DIASTOLIC BLOOD PRESSURE < 80 MM HG: ICD-10-PCS | Mod: CPTII,S$GLB,, | Performed by: NURSE PRACTITIONER

## 2023-09-19 PROCEDURE — 3078F DIAST BP <80 MM HG: CPT | Mod: CPTII,S$GLB,, | Performed by: NURSE PRACTITIONER

## 2023-09-19 PROCEDURE — 3074F SYST BP LT 130 MM HG: CPT | Mod: CPTII,S$GLB,, | Performed by: NURSE PRACTITIONER

## 2023-09-19 PROCEDURE — 1159F PR MEDICATION LIST DOCUMENTED IN MEDICAL RECORD: ICD-10-PCS | Mod: CPTII,S$GLB,, | Performed by: NURSE PRACTITIONER

## 2023-09-19 PROCEDURE — 99999 PR PBB SHADOW E&M-EST. PATIENT-LVL IV: ICD-10-PCS | Mod: PBBFAC,,, | Performed by: NURSE PRACTITIONER

## 2023-09-19 PROCEDURE — 99213 PR OFFICE/OUTPT VISIT, EST, LEVL III, 20-29 MIN: ICD-10-PCS | Mod: S$GLB,,, | Performed by: NURSE PRACTITIONER

## 2023-09-19 PROCEDURE — 3044F HG A1C LEVEL LT 7.0%: CPT | Mod: CPTII,S$GLB,, | Performed by: NURSE PRACTITIONER

## 2023-09-19 PROCEDURE — 3044F PR MOST RECENT HEMOGLOBIN A1C LEVEL <7.0%: ICD-10-PCS | Mod: CPTII,S$GLB,, | Performed by: NURSE PRACTITIONER

## 2023-09-19 RX ORDER — METHYLPREDNISOLONE 4 MG/1
TABLET ORAL
Qty: 21 EACH | Refills: 0 | Status: SHIPPED | OUTPATIENT
Start: 2023-09-19 | End: 2023-10-10

## 2023-09-19 RX ORDER — PROMETHAZINE HYDROCHLORIDE AND DEXTROMETHORPHAN HYDROBROMIDE 6.25; 15 MG/5ML; MG/5ML
5 SYRUP ORAL EVERY 6 HOURS PRN
Qty: 180 ML | Refills: 0 | Status: SHIPPED | OUTPATIENT
Start: 2023-09-19 | End: 2023-09-26

## 2023-09-19 RX ORDER — AZELASTINE 1 MG/ML
1 SPRAY, METERED NASAL 2 TIMES DAILY
Qty: 30 ML | Refills: 0 | Status: SHIPPED | OUTPATIENT
Start: 2023-09-19 | End: 2024-09-18

## 2023-09-19 NOTE — LETTER
09/19/2023                 [facility]  [address]  [place] [address]  Phone: [phone]  Fax: [phone]   09/19/2023    Patient: Fco Watts   YOB: 1980   Date of Visit: 9/19/2023       To Whom it May Concern:    Fco Watts was seen in my clinic on 9/19/2023. She may return to work on 9/21/2023 .    If you have any questions or concerns, please don't hesitate to call.    Sincerely,         Sally Contreras, CANDIP-C

## 2023-09-19 NOTE — PROGRESS NOTES
Subjective:       Patient ID: Shawndrosalva Watts is a 42 y.o. female.    Chief Complaint: Cough, Fatigue, and Nasal Congestion    Mrs. Watts presents to clinic for nasal congestion, cough, runny nose, and fatigue. Symptoms started 3 days prior. +chills and subjective fever. Children were sick with similar symptoms week prior. Taking Sudafed and Nyquil with no relief. Denies CP or SOB.      Cough  This is a new problem. The current episode started in the past 7 days. The problem has been gradually worsening. The problem occurs constantly. The cough is Non-productive. Associated symptoms include chills, nasal congestion, postnasal drip, rhinorrhea and a sore throat. Pertinent negatives include no chest pain, ear congestion, ear pain, fever, headaches, hemoptysis, myalgias, rash, shortness of breath or wheezing. She has tried OTC cough suppressant for the symptoms. The treatment provided no relief.       Patient Active Problem List   Diagnosis    Obesity (BMI 30.0-34.9)    Attention deficit hyperactivity disorder (ADHD), predominantly inattentive type    Acne    Encounter for hepatitis C screening test for low risk patient    Pneumonia of both lower lobes due to infectious organism    History of colposcopy    Family history of breast cancer in mother    Family history of colon cancer in father    Disorder of tendon of right shoulder region       Family History   Problem Relation Age of Onset    Breast cancer Mother 71    Hypertension Mother     Colon cancer Father 60    Ovarian cancer Neg Hx      Past Surgical History:   Procedure Laterality Date     SECTION  06/23/2010    x1    DILATION AND CURETTAGE OF UTERUS      stillbirth    HYSTERECTOMY      Still has ovaries (hx abnl pap); mayda         Current Outpatient Medications:     metroNIDAZOLE (FLAGYL) 500 MG tablet, Take 1 tablet (500 mg total) by mouth 2 (two) times a day. for 7 days, Disp: 14 tablet, Rfl: 0    azelastine (ASTELIN) 137 mcg (0.1 %) nasal  "spray, 1 spray (137 mcg total) by Nasal route 2 (two) times daily., Disp: 30 mL, Rfl: 0    methylPREDNISolone (MEDROL DOSEPACK) 4 mg tablet, use as directed, Disp: 21 each, Rfl: 0    promethazine-dextromethorphan (PROMETHAZINE-DM) 6.25-15 mg/5 mL Syrp, Take 5 mLs by mouth every 6 (six) hours as needed (cough)., Disp: 180 mL, Rfl: 0    Review of Systems   Constitutional:  Positive for appetite change, chills and fatigue. Negative for fever.   HENT:  Positive for congestion, postnasal drip, rhinorrhea, sinus pressure, sore throat and voice change. Negative for ear pain.    Respiratory:  Positive for cough. Negative for hemoptysis, shortness of breath and wheezing.    Cardiovascular:  Negative for chest pain.   Musculoskeletal:  Negative for myalgias.   Skin:  Negative for rash.   Neurological:  Negative for headaches.       Objective:   /70 (BP Location: Left arm, Patient Position: Sitting, BP Method: Large (Manual))   Pulse 85   Temp 98.8 °F (37.1 °C)   Ht 5' 6" (1.676 m)   Wt 86.7 kg (191 lb 2.2 oz)   LMP  (LMP Unknown) Comment: Partial Hysterectomy   SpO2 96%   BMI 30.85 kg/m²      Physical Exam  Vitals reviewed.   Constitutional:       Appearance: Normal appearance. She is ill-appearing (mild).   HENT:      Head: Normocephalic and atraumatic.      Right Ear: Ear canal normal. No middle ear effusion. Tympanic membrane is not erythematous or bulging.      Left Ear: Ear canal normal.  No middle ear effusion. Tympanic membrane is not erythematous or bulging.      Nose: Mucosal edema and congestion present. No rhinorrhea.      Right Sinus: No maxillary sinus tenderness.      Left Sinus: Maxillary sinus tenderness present.      Mouth/Throat:      Mouth: Mucous membranes are moist.      Pharynx: Posterior oropharyngeal erythema (mild) present. No oropharyngeal exudate.      Comments: +hoarse  Eyes:      General:         Right eye: No discharge.         Left eye: No discharge.   Cardiovascular:      Rate and " Rhythm: Normal rate and regular rhythm.      Heart sounds: Normal heart sounds. No murmur heard.  Pulmonary:      Effort: Pulmonary effort is normal. No respiratory distress.      Breath sounds: Normal breath sounds. No wheezing, rhonchi or rales.      Comments: +frequent cough  Musculoskeletal:      Cervical back: Normal range of motion. No rigidity.   Lymphadenopathy:      Cervical: No cervical adenopathy.   Skin:     General: Skin is warm and dry.      Coloration: Skin is not pale.      Findings: No erythema or rash.   Neurological:      Mental Status: She is alert and oriented to person, place, and time.   Psychiatric:         Mood and Affect: Mood normal.         Behavior: Behavior normal.         Assessment & Plan     Results for orders placed or performed in visit on 09/19/23   POCT COVID-19 Rapid Screening   Result Value Ref Range    POC Rapid COVID Negative Negative     Acceptable Yes        1. Bronchitis  Comments:  Symptomatic care and RTC precautions reviewed.  Orders:  -     methylPREDNISolone (MEDROL DOSEPACK) 4 mg tablet; use as directed  Dispense: 21 each; Refill: 0  -     promethazine-dextromethorphan (PROMETHAZINE-DM) 6.25-15 mg/5 mL Syrp; Take 5 mLs by mouth every 6 (six) hours as needed (cough).  Dispense: 180 mL; Refill: 0  -     azelastine (ASTELIN) 137 mcg (0.1 %) nasal spray; 1 spray (137 mcg total) by Nasal route 2 (two) times daily.  Dispense: 30 mL; Refill: 0    2. Acute cough  -     POCT COVID-19 Rapid Screening    Over-the-counter Tylenol/ibuprofen as needed for pain and fever if not contraindicated   Hand hygiene   Rest and maintain adequate hydration.   Antihistamine and flonase for nasal congestion and upper respiratory symptoms  Warm salt water gargles, chloraseptic spray, and lozenges as needed for sore throat.  Notify clinic if symptoms do not improve or worsen.        CHRIS Guerrero      Portions of this note may have been created with voice recognition  "software. Occasional "wrong-word" or "sound-a-like" substitutions may have occurred due to the inherent limitations of voice recognition software. Please, read the note carefully and recognize, using context, where substitutions have occurred.     "

## 2023-10-18 ENCOUNTER — TELEPHONE (OUTPATIENT)
Dept: PRIMARY CARE CLINIC | Facility: CLINIC | Age: 43
End: 2023-10-18
Payer: COMMERCIAL